# Patient Record
Sex: MALE | Race: BLACK OR AFRICAN AMERICAN | Employment: UNEMPLOYED | URBAN - METROPOLITAN AREA
[De-identification: names, ages, dates, MRNs, and addresses within clinical notes are randomized per-mention and may not be internally consistent; named-entity substitution may affect disease eponyms.]

---

## 2018-01-01 ENCOUNTER — TELEPHONE (OUTPATIENT)
Dept: PHARMACY | Age: 28
End: 2018-01-01

## 2018-01-01 ENCOUNTER — HOSPITAL ENCOUNTER (OUTPATIENT)
Dept: PHARMACY | Age: 28
Setting detail: THERAPIES SERIES
Discharge: HOME | End: 2018-09-04
Payer: MEDICAID

## 2018-01-01 ENCOUNTER — APPOINTMENT (OUTPATIENT)
Dept: CT IMAGING | Age: 28
DRG: 181 | End: 2018-01-01
Payer: MEDICAID

## 2018-01-01 ENCOUNTER — HOSPITAL ENCOUNTER (OUTPATIENT)
Dept: PHARMACY | Age: 28
Setting detail: THERAPIES SERIES
Discharge: HOME | End: 2018-08-22
Payer: MEDICAID

## 2018-01-01 ENCOUNTER — OFFICE VISIT (OUTPATIENT)
Dept: FAMILY MEDICINE CLINIC | Age: 28
End: 2018-01-01
Payer: MEDICAID

## 2018-01-01 ENCOUNTER — APPOINTMENT (OUTPATIENT)
Dept: GENERAL RADIOLOGY | Age: 28
DRG: 194 | End: 2018-01-01
Payer: MEDICAID

## 2018-01-01 ENCOUNTER — TELEPHONE (OUTPATIENT)
Dept: FAMILY MEDICINE CLINIC | Age: 28
End: 2018-01-01

## 2018-01-01 ENCOUNTER — HOSPITAL ENCOUNTER (OUTPATIENT)
Age: 28
Setting detail: SPECIMEN
Discharge: HOME | End: 2018-10-11
Payer: MEDICAID

## 2018-01-01 ENCOUNTER — APPOINTMENT (OUTPATIENT)
Dept: GENERAL RADIOLOGY | Age: 28
DRG: 181 | End: 2018-01-01
Payer: MEDICAID

## 2018-01-01 ENCOUNTER — APPOINTMENT (OUTPATIENT)
Dept: INTERVENTIONAL RADIOLOGY/VASCULAR | Age: 28
DRG: 181 | End: 2018-01-01
Payer: MEDICAID

## 2018-01-01 ENCOUNTER — HOSPITAL ENCOUNTER (OUTPATIENT)
Dept: PHARMACY | Age: 28
Setting detail: THERAPIES SERIES
Discharge: HOME | End: 2018-10-22
Payer: MEDICAID

## 2018-01-01 ENCOUNTER — HOSPITAL ENCOUNTER (OUTPATIENT)
Dept: PHARMACY | Age: 28
Setting detail: THERAPIES SERIES
Discharge: HOME | End: 2018-08-24
Payer: MEDICAID

## 2018-01-01 ENCOUNTER — HOSPITAL ENCOUNTER (OUTPATIENT)
Dept: PHARMACY | Age: 28
Setting detail: THERAPIES SERIES
Discharge: HOME | End: 2018-10-10
Payer: MEDICAID

## 2018-01-01 ENCOUNTER — HOSPITAL ENCOUNTER (OUTPATIENT)
Dept: PHARMACY | Age: 28
Setting detail: THERAPIES SERIES
Discharge: HOME | End: 2018-09-11
Payer: MEDICAID

## 2018-01-01 ENCOUNTER — HOSPITAL ENCOUNTER (OUTPATIENT)
Dept: PHARMACY | Age: 28
Setting detail: THERAPIES SERIES
Discharge: HOME | End: 2018-10-16
Payer: MEDICAID

## 2018-01-01 ENCOUNTER — TELEPHONE (OUTPATIENT)
Dept: ADMINISTRATIVE | Age: 28
End: 2018-01-01

## 2018-01-01 ENCOUNTER — HOSPITAL ENCOUNTER (INPATIENT)
Age: 28
LOS: 14 days | Discharge: HOSPICE | DRG: 181 | End: 2018-12-10
Attending: EMERGENCY MEDICINE | Admitting: FAMILY MEDICINE
Payer: MEDICAID

## 2018-01-01 ENCOUNTER — HOSPITAL ENCOUNTER (OUTPATIENT)
Dept: PHARMACY | Age: 28
Setting detail: THERAPIES SERIES
Discharge: HOME | End: 2018-11-14
Payer: MEDICAID

## 2018-01-01 ENCOUNTER — HOSPITAL ENCOUNTER (INPATIENT)
Age: 28
LOS: 5 days | Discharge: HOME HEALTH | DRG: 194 | End: 2018-08-19
Attending: EMERGENCY MEDICINE | Admitting: FAMILY MEDICINE
Payer: MEDICAID

## 2018-01-01 ENCOUNTER — HOSPITAL ENCOUNTER (OUTPATIENT)
Dept: PHARMACY | Age: 28
Setting detail: THERAPIES SERIES
Discharge: HOME | End: 2018-08-27
Payer: MEDICAID

## 2018-01-01 ENCOUNTER — HOSPITAL ENCOUNTER (OUTPATIENT)
Dept: PHARMACY | Age: 28
Setting detail: THERAPIES SERIES
Discharge: HOME | End: 2018-10-31
Payer: MEDICAID

## 2018-01-01 ENCOUNTER — HOSPITAL ENCOUNTER (OUTPATIENT)
Dept: PHARMACY | Age: 28
Setting detail: THERAPIES SERIES
Discharge: HOME | End: 2018-09-25
Payer: MEDICAID

## 2018-01-01 ENCOUNTER — ANESTHESIA EVENT (OUTPATIENT)
Dept: OPERATING ROOM | Age: 28
DRG: 181 | End: 2018-01-01
Payer: MEDICAID

## 2018-01-01 ENCOUNTER — ANESTHESIA (OUTPATIENT)
Dept: OPERATING ROOM | Age: 28
DRG: 181 | End: 2018-01-01
Payer: MEDICAID

## 2018-01-01 VITALS
SYSTOLIC BLOOD PRESSURE: 130 MMHG | DIASTOLIC BLOOD PRESSURE: 70 MMHG | TEMPERATURE: 96.8 F | HEIGHT: 66 IN | HEART RATE: 90 BPM | BODY MASS INDEX: 22.79 KG/M2 | WEIGHT: 141.8 LBS

## 2018-01-01 VITALS
SYSTOLIC BLOOD PRESSURE: 141 MMHG | WEIGHT: 126.76 LBS | TEMPERATURE: 98.1 F | RESPIRATION RATE: 22 BRPM | HEART RATE: 115 BPM | OXYGEN SATURATION: 93 % | DIASTOLIC BLOOD PRESSURE: 109 MMHG | BODY MASS INDEX: 20.37 KG/M2 | HEIGHT: 66 IN

## 2018-01-01 VITALS
DIASTOLIC BLOOD PRESSURE: 97 MMHG | WEIGHT: 119 LBS | TEMPERATURE: 97.5 F | BODY MASS INDEX: 19.13 KG/M2 | HEIGHT: 66 IN | HEART RATE: 126 BPM | OXYGEN SATURATION: 94 % | SYSTOLIC BLOOD PRESSURE: 112 MMHG | RESPIRATION RATE: 20 BRPM

## 2018-01-01 VITALS
HEIGHT: 66 IN | HEART RATE: 80 BPM | SYSTOLIC BLOOD PRESSURE: 120 MMHG | DIASTOLIC BLOOD PRESSURE: 70 MMHG | BODY MASS INDEX: 19.86 KG/M2 | WEIGHT: 123.6 LBS | TEMPERATURE: 96.2 F

## 2018-01-01 VITALS
BODY MASS INDEX: 29.37 KG/M2 | TEMPERATURE: 98.6 F | SYSTOLIC BLOOD PRESSURE: 145 MMHG | HEIGHT: 62 IN | HEART RATE: 106 BPM | DIASTOLIC BLOOD PRESSURE: 97 MMHG | WEIGHT: 159.6 LBS

## 2018-01-01 VITALS
TEMPERATURE: 97.1 F | BODY MASS INDEX: 19.29 KG/M2 | DIASTOLIC BLOOD PRESSURE: 70 MMHG | HEIGHT: 66 IN | WEIGHT: 120 LBS | SYSTOLIC BLOOD PRESSURE: 120 MMHG | HEART RATE: 80 BPM

## 2018-01-01 VITALS — WEIGHT: 146.8 LBS | BODY MASS INDEX: 27.02 KG/M2 | HEIGHT: 62 IN | TEMPERATURE: 98.5 F

## 2018-01-01 VITALS — TEMPERATURE: 97.4 F | BODY MASS INDEX: 20.48 KG/M2 | HEIGHT: 66 IN | WEIGHT: 127.4 LBS

## 2018-01-01 VITALS
BODY MASS INDEX: 27.09 KG/M2 | SYSTOLIC BLOOD PRESSURE: 132 MMHG | HEIGHT: 62 IN | TEMPERATURE: 98.1 F | DIASTOLIC BLOOD PRESSURE: 86 MMHG | HEART RATE: 74 BPM | WEIGHT: 147.2 LBS

## 2018-01-01 VITALS — SYSTOLIC BLOOD PRESSURE: 89 MMHG | DIASTOLIC BLOOD PRESSURE: 63 MMHG | TEMPERATURE: 96.9 F | OXYGEN SATURATION: 97 %

## 2018-01-01 DIAGNOSIS — R63.0 DECREASED APPETITE: Primary | ICD-10-CM

## 2018-01-01 DIAGNOSIS — I51.3 ATRIAL THROMBUS WITHOUT ANTECEDENT MYOCARDIAL INFARCTION: ICD-10-CM

## 2018-01-01 DIAGNOSIS — Z23 NEED FOR VACCINATION: ICD-10-CM

## 2018-01-01 DIAGNOSIS — F79 MENTAL RETARDATION: ICD-10-CM

## 2018-01-01 DIAGNOSIS — J18.9 PNEUMONIA DUE TO INFECTIOUS ORGANISM, UNSPECIFIED LATERALITY, UNSPECIFIED PART OF LUNG: Primary | ICD-10-CM

## 2018-01-01 DIAGNOSIS — R60.0 BILATERAL EDEMA OF LOWER EXTREMITY: ICD-10-CM

## 2018-01-01 DIAGNOSIS — I26.99 OTHER ACUTE PULMONARY EMBOLISM WITHOUT ACUTE COR PULMONALE (HCC): ICD-10-CM

## 2018-01-01 DIAGNOSIS — J01.90 ACUTE BACTERIAL SINUSITIS: Primary | ICD-10-CM

## 2018-01-01 DIAGNOSIS — I50.21 ACUTE SYSTOLIC CONGESTIVE HEART FAILURE (HCC): ICD-10-CM

## 2018-01-01 DIAGNOSIS — R05.8 RECURRENT PRODUCTIVE COUGH: Primary | ICD-10-CM

## 2018-01-01 DIAGNOSIS — G40.909 SEIZURE DISORDER (HCC): Primary | ICD-10-CM

## 2018-01-01 DIAGNOSIS — B96.89 ACUTE BACTERIAL SINUSITIS: Primary | ICD-10-CM

## 2018-01-01 DIAGNOSIS — J06.9 VIRAL URI: ICD-10-CM

## 2018-01-01 DIAGNOSIS — G40.909 SEIZURE DISORDER (HCC): ICD-10-CM

## 2018-01-01 DIAGNOSIS — R05.8 RECURRENT PRODUCTIVE COUGH: ICD-10-CM

## 2018-01-01 DIAGNOSIS — I50.9 CONGESTIVE HEART FAILURE, UNSPECIFIED HF CHRONICITY, UNSPECIFIED HEART FAILURE TYPE (HCC): Primary | ICD-10-CM

## 2018-01-01 DIAGNOSIS — I50.42 CHRONIC COMBINED SYSTOLIC AND DIASTOLIC CONGESTIVE HEART FAILURE (HCC): ICD-10-CM

## 2018-01-01 DIAGNOSIS — R05.8 POST-VIRAL COUGH SYNDROME: Primary | ICD-10-CM

## 2018-01-01 DIAGNOSIS — I50.9 ACUTE ON CHRONIC CONGESTIVE HEART FAILURE, UNSPECIFIED HEART FAILURE TYPE (HCC): Primary | ICD-10-CM

## 2018-01-01 LAB
-: NORMAL
ABSOLUTE EOS #: 0 K/UL (ref 0–0.4)
ABSOLUTE EOS #: 0 K/UL (ref 0–0.4)
ABSOLUTE EOS #: 0 K/UL (ref 0–0.44)
ABSOLUTE EOS #: 0 K/UL (ref 0–0.44)
ABSOLUTE EOS #: 0.03 K/UL (ref 0–0.44)
ABSOLUTE EOS #: 0.03 K/UL (ref 0–0.44)
ABSOLUTE EOS #: 0.04 K/UL (ref 0–0.44)
ABSOLUTE EOS #: 0.08 K/UL (ref 0–0.4)
ABSOLUTE EOS #: <0.03 K/UL (ref 0–0.44)
ABSOLUTE IMMATURE GRANULOCYTE: 0 K/UL (ref 0–0.3)
ABSOLUTE IMMATURE GRANULOCYTE: 0.03 K/UL (ref 0–0.3)
ABSOLUTE IMMATURE GRANULOCYTE: 0.03 K/UL (ref 0–0.3)
ABSOLUTE IMMATURE GRANULOCYTE: 0.04 K/UL (ref 0–0.3)
ABSOLUTE IMMATURE GRANULOCYTE: 0.05 K/UL (ref 0–0.3)
ABSOLUTE IMMATURE GRANULOCYTE: 0.06 K/UL (ref 0–0.3)
ABSOLUTE IMMATURE GRANULOCYTE: <0.03 K/UL (ref 0–0.3)
ABSOLUTE LYMPH #: 1.03 K/UL (ref 1.1–3.7)
ABSOLUTE LYMPH #: 1.2 K/UL (ref 1–4.8)
ABSOLUTE LYMPH #: 1.34 K/UL (ref 1–4.8)
ABSOLUTE LYMPH #: 1.87 K/UL (ref 1.1–3.7)
ABSOLUTE LYMPH #: 2 K/UL (ref 1–4.8)
ABSOLUTE LYMPH #: 2.11 K/UL (ref 1.1–3.7)
ABSOLUTE LYMPH #: 2.17 K/UL (ref 1.1–3.7)
ABSOLUTE LYMPH #: 2.35 K/UL (ref 1.1–3.7)
ABSOLUTE LYMPH #: 2.37 K/UL (ref 1.1–3.7)
ABSOLUTE LYMPH #: 2.78 K/UL (ref 1.1–3.7)
ABSOLUTE LYMPH #: 3.3 K/UL (ref 1.1–3.7)
ABSOLUTE LYMPH #: 3.58 K/UL (ref 1.1–3.7)
ABSOLUTE LYMPH #: 3.62 K/UL (ref 1.1–3.7)
ABSOLUTE LYMPH #: 3.9 K/UL (ref 1.1–3.7)
ABSOLUTE MONO #: 0 K/UL (ref 0.1–0.8)
ABSOLUTE MONO #: 0.21 K/UL (ref 0.1–1.2)
ABSOLUTE MONO #: 0.23 K/UL (ref 0.1–0.8)
ABSOLUTE MONO #: 0.23 K/UL (ref 0.1–1.2)
ABSOLUTE MONO #: 0.25 K/UL (ref 0.1–1.2)
ABSOLUTE MONO #: 0.29 K/UL (ref 0.1–0.8)
ABSOLUTE MONO #: 0.29 K/UL (ref 0.1–1.2)
ABSOLUTE MONO #: 0.32 K/UL (ref 0.1–1.2)
ABSOLUTE MONO #: 0.33 K/UL (ref 0.1–1.2)
ABSOLUTE MONO #: 0.38 K/UL (ref 0.1–1.2)
ABSOLUTE MONO #: 0.39 K/UL (ref 0.1–1.2)
ABSOLUTE MONO #: 0.39 K/UL (ref 0.1–1.2)
ABSOLUTE MONO #: 0.41 K/UL (ref 0.1–1.2)
ABSOLUTE MONO #: 0.56 K/UL (ref 0.1–1.2)
ADENOVIRUS PCR: NOT DETECTED
ALBUMIN SERPL-MCNC: 2.3 G/DL (ref 3.5–5.2)
ALBUMIN SERPL-MCNC: 3 G/DL (ref 3.5–5.2)
ALBUMIN/GLOBULIN RATIO: 0.6 (ref 1–2.5)
ALBUMIN/GLOBULIN RATIO: 1.3 (ref 1–2.5)
ALP BLD-CCNC: 53 U/L (ref 40–129)
ALP BLD-CCNC: 71 U/L (ref 40–129)
ALT SERPL-CCNC: 10 U/L (ref 5–41)
ALT SERPL-CCNC: <5 U/L (ref 5–41)
ANION GAP SERPL CALCULATED.3IONS-SCNC: 12 MMOL/L (ref 9–17)
ANION GAP SERPL CALCULATED.3IONS-SCNC: 13 MMOL/L (ref 9–17)
ANION GAP SERPL CALCULATED.3IONS-SCNC: 14 MMOL/L (ref 9–17)
ANION GAP SERPL CALCULATED.3IONS-SCNC: 15 MMOL/L (ref 9–17)
ANION GAP SERPL CALCULATED.3IONS-SCNC: 16 MMOL/L (ref 9–17)
ANION GAP SERPL CALCULATED.3IONS-SCNC: 18 MMOL/L (ref 9–17)
ANION GAP SERPL CALCULATED.3IONS-SCNC: 24 MMOL/L (ref 9–17)
ANION GAP SERPL CALCULATED.3IONS-SCNC: 28 MMOL/L (ref 9–17)
ANTICARDIOLIPIN IGA ANTIBODY: 3.2 APU
ANTICARDIOLIPIN IGG ANTIBODY: 2.2 GPU
AST SERPL-CCNC: 15 U/L
AST SERPL-CCNC: <5 U/L
AT-III ACTIVITY: 84 % (ref 83–122)
BASOPHILS # BLD: 0 % (ref 0–2)
BASOPHILS # BLD: 1 % (ref 0–2)
BASOPHILS # BLD: 1 % (ref 0–2)
BASOPHILS ABSOLUTE: 0 K/UL (ref 0–0.2)
BASOPHILS ABSOLUTE: 0.03 K/UL (ref 0–0.2)
BASOPHILS ABSOLUTE: 0.05 K/UL (ref 0–0.2)
BASOPHILS ABSOLUTE: <0.03 K/UL (ref 0–0.2)
BILIRUB SERPL-MCNC: 1.12 MG/DL (ref 0.3–1.2)
BILIRUB SERPL-MCNC: 1.35 MG/DL (ref 0.3–1.2)
BNP INTERPRETATION: ABNORMAL
BNP INTERPRETATION: ABNORMAL
BORDETELLA PERTUSSIS PCR: NOT DETECTED
BUN BLDV-MCNC: 12 MG/DL (ref 6–20)
BUN BLDV-MCNC: 12 MG/DL (ref 6–20)
BUN BLDV-MCNC: 13 MG/DL (ref 6–20)
BUN BLDV-MCNC: 15 MG/DL (ref 6–20)
BUN BLDV-MCNC: 15 MG/DL (ref 6–20)
BUN BLDV-MCNC: 16 MG/DL (ref 6–20)
BUN BLDV-MCNC: 17 MG/DL (ref 6–20)
BUN BLDV-MCNC: 18 MG/DL (ref 6–20)
BUN BLDV-MCNC: 21 MG/DL (ref 6–20)
BUN BLDV-MCNC: 22 MG/DL (ref 6–20)
BUN BLDV-MCNC: 24 MG/DL (ref 6–20)
BUN BLDV-MCNC: 24 MG/DL (ref 6–20)
BUN BLDV-MCNC: 27 MG/DL (ref 6–20)
BUN BLDV-MCNC: 28 MG/DL (ref 6–20)
BUN BLDV-MCNC: 29 MG/DL (ref 6–20)
BUN BLDV-MCNC: 8 MG/DL (ref 6–20)
BUN/CREAT BLD: ABNORMAL (ref 9–20)
BUN/CREAT BLD: NORMAL (ref 9–20)
C-REACTIVE PROTEIN: 4.9 MG/L (ref 0–5)
CALCIUM SERPL-MCNC: 8 MG/DL (ref 8.6–10.4)
CALCIUM SERPL-MCNC: 8.2 MG/DL (ref 8.6–10.4)
CALCIUM SERPL-MCNC: 8.4 MG/DL (ref 8.6–10.4)
CALCIUM SERPL-MCNC: 8.4 MG/DL (ref 8.6–10.4)
CALCIUM SERPL-MCNC: 8.5 MG/DL (ref 8.6–10.4)
CALCIUM SERPL-MCNC: 8.5 MG/DL (ref 8.6–10.4)
CALCIUM SERPL-MCNC: 8.6 MG/DL (ref 8.6–10.4)
CALCIUM SERPL-MCNC: 8.8 MG/DL (ref 8.6–10.4)
CALCIUM SERPL-MCNC: 8.9 MG/DL (ref 8.6–10.4)
CALCIUM SERPL-MCNC: 9 MG/DL (ref 8.6–10.4)
CALCIUM SERPL-MCNC: 9.1 MG/DL (ref 8.6–10.4)
CALCIUM SERPL-MCNC: 9.1 MG/DL (ref 8.6–10.4)
CALCIUM SERPL-MCNC: 9.2 MG/DL (ref 8.6–10.4)
CALCIUM SERPL-MCNC: 9.4 MG/DL (ref 8.6–10.4)
CALCIUM SERPL-MCNC: 9.5 MG/DL (ref 8.6–10.4)
CALCIUM SERPL-MCNC: 9.6 MG/DL (ref 8.6–10.4)
CARDIOLIPIN AB IGM: 20.8 MPU
CHLAMYDIA PNEUMONIAE BY PCR: NOT DETECTED
CHLORIDE BLD-SCNC: 101 MMOL/L (ref 98–107)
CHLORIDE BLD-SCNC: 101 MMOL/L (ref 98–107)
CHLORIDE BLD-SCNC: 102 MMOL/L (ref 98–107)
CHLORIDE BLD-SCNC: 103 MMOL/L (ref 98–107)
CHLORIDE BLD-SCNC: 103 MMOL/L (ref 98–107)
CHLORIDE BLD-SCNC: 105 MMOL/L (ref 98–107)
CHLORIDE BLD-SCNC: 107 MMOL/L (ref 98–107)
CHLORIDE BLD-SCNC: 110 MMOL/L (ref 98–107)
CHLORIDE BLD-SCNC: 110 MMOL/L (ref 98–107)
CHLORIDE BLD-SCNC: 111 MMOL/L (ref 98–107)
CHLORIDE BLD-SCNC: 111 MMOL/L (ref 98–107)
CHLORIDE BLD-SCNC: 112 MMOL/L (ref 98–107)
CHLORIDE BLD-SCNC: 113 MMOL/L (ref 98–107)
CHLORIDE BLD-SCNC: 98 MMOL/L (ref 98–107)
CHLORIDE BLD-SCNC: 98 MMOL/L (ref 98–107)
CHLORIDE BLD-SCNC: 99 MMOL/L (ref 98–107)
CO2: 13 MMOL/L (ref 20–31)
CO2: 16 MMOL/L (ref 20–31)
CO2: 16 MMOL/L (ref 20–31)
CO2: 17 MMOL/L (ref 20–31)
CO2: 18 MMOL/L (ref 20–31)
CO2: 18 MMOL/L (ref 20–31)
CO2: 19 MMOL/L (ref 20–31)
CO2: 20 MMOL/L (ref 20–31)
CO2: 21 MMOL/L (ref 20–31)
CO2: 22 MMOL/L (ref 20–31)
CO2: 22 MMOL/L (ref 20–31)
CORONAVIRUS 229E PCR: NOT DETECTED
CORONAVIRUS HKU1 PCR: NOT DETECTED
CORONAVIRUS NL63 PCR: NOT DETECTED
CORONAVIRUS OC43 PCR: NOT DETECTED
CREAT SERPL-MCNC: 0.51 MG/DL (ref 0.7–1.2)
CREAT SERPL-MCNC: 0.58 MG/DL (ref 0.7–1.2)
CREAT SERPL-MCNC: 0.61 MG/DL (ref 0.7–1.2)
CREAT SERPL-MCNC: 0.68 MG/DL (ref 0.7–1.2)
CREAT SERPL-MCNC: 0.75 MG/DL (ref 0.7–1.2)
CREAT SERPL-MCNC: 0.76 MG/DL (ref 0.7–1.2)
CREAT SERPL-MCNC: 0.78 MG/DL (ref 0.7–1.2)
CREAT SERPL-MCNC: 0.78 MG/DL (ref 0.7–1.2)
CREAT SERPL-MCNC: 0.79 MG/DL (ref 0.7–1.2)
CREAT SERPL-MCNC: 0.88 MG/DL (ref 0.7–1.2)
CREAT SERPL-MCNC: 0.88 MG/DL (ref 0.7–1.2)
CREAT SERPL-MCNC: 0.94 MG/DL (ref 0.7–1.2)
CREAT SERPL-MCNC: 0.97 MG/DL (ref 0.7–1.2)
CREAT SERPL-MCNC: 0.99 MG/DL (ref 0.7–1.2)
CREAT SERPL-MCNC: 1.02 MG/DL (ref 0.7–1.2)
CREAT SERPL-MCNC: 1.15 MG/DL (ref 0.7–1.2)
CULTURE: NORMAL
CULTURE: NORMAL
DIFFERENTIAL TYPE: ABNORMAL
DILUTE RUSSELL VIPER VENOM TIME: ABNORMAL
EKG ATRIAL RATE: 113 BPM
EKG ATRIAL RATE: 122 BPM
EKG ATRIAL RATE: 133 BPM
EKG P AXIS: 55 DEGREES
EKG P AXIS: 64 DEGREES
EKG P AXIS: 73 DEGREES
EKG P-R INTERVAL: 136 MS
EKG P-R INTERVAL: 138 MS
EKG P-R INTERVAL: 148 MS
EKG Q-T INTERVAL: 290 MS
EKG Q-T INTERVAL: 322 MS
EKG Q-T INTERVAL: 340 MS
EKG QRS DURATION: 70 MS
EKG QRS DURATION: 70 MS
EKG QRS DURATION: 72 MS
EKG QTC CALCULATION (BAZETT): 431 MS
EKG QTC CALCULATION (BAZETT): 458 MS
EKG QTC CALCULATION (BAZETT): 466 MS
EKG R AXIS: -24 DEGREES
EKG R AXIS: -31 DEGREES
EKG R AXIS: -37 DEGREES
EKG T AXIS: 109 DEGREES
EKG T AXIS: 22 DEGREES
EKG T AXIS: 97 DEGREES
EKG VENTRICULAR RATE: 113 BPM
EKG VENTRICULAR RATE: 122 BPM
EKG VENTRICULAR RATE: 133 BPM
EOSINOPHILS RELATIVE PERCENT: 0 % (ref 1–4)
EOSINOPHILS RELATIVE PERCENT: 1 % (ref 1–4)
FACTOR V LEIDEN MUTATION: NORMAL
FACTOR VIII ACTIVITY: 379 % (ref 50–150)
GFR AFRICAN AMERICAN: >60 ML/MIN
GFR NON-AFRICAN AMERICAN: >60 ML/MIN
GFR SERPL CREATININE-BSD FRML MDRD: ABNORMAL ML/MIN/{1.73_M2}
GFR SERPL CREATININE-BSD FRML MDRD: NORMAL ML/MIN/{1.73_M2}
GFR SERPL CREATININE-BSD FRML MDRD: NORMAL ML/MIN/{1.73_M2}
GLUCOSE BLD-MCNC: 100 MG/DL (ref 70–99)
GLUCOSE BLD-MCNC: 101 MG/DL (ref 70–99)
GLUCOSE BLD-MCNC: 105 MG/DL (ref 70–99)
GLUCOSE BLD-MCNC: 105 MG/DL (ref 75–110)
GLUCOSE BLD-MCNC: 109 MG/DL (ref 75–110)
GLUCOSE BLD-MCNC: 111 MG/DL (ref 70–99)
GLUCOSE BLD-MCNC: 142 MG/DL (ref 70–99)
GLUCOSE BLD-MCNC: 151 MG/DL (ref 70–99)
GLUCOSE BLD-MCNC: 60 MG/DL (ref 75–110)
GLUCOSE BLD-MCNC: 62 MG/DL (ref 75–110)
GLUCOSE BLD-MCNC: 68 MG/DL (ref 70–99)
GLUCOSE BLD-MCNC: 68 MG/DL (ref 75–110)
GLUCOSE BLD-MCNC: 69 MG/DL (ref 70–99)
GLUCOSE BLD-MCNC: 75 MG/DL (ref 75–110)
GLUCOSE BLD-MCNC: 75 MG/DL (ref 75–110)
GLUCOSE BLD-MCNC: 76 MG/DL (ref 70–99)
GLUCOSE BLD-MCNC: 77 MG/DL (ref 75–110)
GLUCOSE BLD-MCNC: 78 MG/DL (ref 70–99)
GLUCOSE BLD-MCNC: 82 MG/DL (ref 75–110)
GLUCOSE BLD-MCNC: 87 MG/DL (ref 70–99)
GLUCOSE BLD-MCNC: 87 MG/DL (ref 75–110)
GLUCOSE BLD-MCNC: 88 MG/DL (ref 70–99)
GLUCOSE BLD-MCNC: 90 MG/DL (ref 70–99)
GLUCOSE BLD-MCNC: 92 MG/DL (ref 70–99)
GLUCOSE BLD-MCNC: 92 MG/DL (ref 70–99)
GLUCOSE BLD-MCNC: 92 MG/DL (ref 75–110)
GLUCOSE BLD-MCNC: 94 MG/DL (ref 70–99)
GLUCOSE BLD-MCNC: 94 MG/DL (ref 75–110)
GLUCOSE BLD-MCNC: 96 MG/DL (ref 75–110)
HCT VFR BLD CALC: 39.7 % (ref 40.7–50.3)
HCT VFR BLD CALC: 39.7 % (ref 40.7–50.3)
HCT VFR BLD CALC: 41 % (ref 40.7–50.3)
HCT VFR BLD CALC: 41.4 % (ref 40.7–50.3)
HCT VFR BLD CALC: 42.5 % (ref 40.7–50.3)
HCT VFR BLD CALC: 42.7 % (ref 40.7–50.3)
HCT VFR BLD CALC: 43.1 % (ref 40.7–50.3)
HCT VFR BLD CALC: 43.3 % (ref 40.7–50.3)
HCT VFR BLD CALC: 44.2 % (ref 40.7–50.3)
HCT VFR BLD CALC: 44.9 % (ref 40.7–50.3)
HCT VFR BLD CALC: 45.1 % (ref 40.7–50.3)
HCT VFR BLD CALC: 45.4 % (ref 40.7–50.3)
HCT VFR BLD CALC: 45.7 % (ref 40.7–50.3)
HCT VFR BLD CALC: 46 % (ref 40.7–50.3)
HCT VFR BLD CALC: 46.7 % (ref 40.7–50.3)
HCT VFR BLD CALC: 47.5 % (ref 40.7–50.3)
HCT VFR BLD CALC: 47.6 % (ref 40.7–50.3)
HEMOGLOBIN: 13.1 G/DL (ref 13–17)
HEMOGLOBIN: 13.3 G/DL (ref 13–17)
HEMOGLOBIN: 13.4 G/DL (ref 13–17)
HEMOGLOBIN: 13.6 G/DL (ref 13–17)
HEMOGLOBIN: 13.7 G/DL (ref 13–17)
HEMOGLOBIN: 13.8 G/DL (ref 13–17)
HEMOGLOBIN: 13.9 G/DL (ref 13–17)
HEMOGLOBIN: 14.4 G/DL (ref 13–17)
HEMOGLOBIN: 14.4 G/DL (ref 13–17)
HEMOGLOBIN: 14.5 G/DL (ref 13–17)
HEMOGLOBIN: 14.5 G/DL (ref 13–17)
HEMOGLOBIN: 14.9 G/DL (ref 13–17)
HEMOGLOBIN: 14.9 G/DL (ref 13–17)
HEMOGLOBIN: 15 G/DL (ref 13–17)
HEMOGLOBIN: 15.1 G/DL (ref 13–17)
HEMOGLOBIN: 15.3 G/DL (ref 13–17)
HEMOGLOBIN: 15.4 G/DL (ref 13–17)
HUMAN METAPNEUMOVIRUS PCR: NOT DETECTED
IMMATURE GRANULOCYTES: 0 %
IMMATURE GRANULOCYTES: 1 %
INFLUENZA A BY PCR: NOT DETECTED
INFLUENZA A H1 (2009) PCR: NORMAL
INFLUENZA A H1 PCR: NORMAL
INFLUENZA A H3 PCR: NORMAL
INFLUENZA B BY PCR: NOT DETECTED
INR BLD: 1.2
INR BLD: 1.3
INR BLD: 1.5
INR BLD: 1.6
INR BLD: 1.9
INR BLD: 1.9
INR BLD: 10.6
INR BLD: 2
INR BLD: 2
INR BLD: 2.1
INR BLD: 2.3
INR BLD: 2.4
INR BLD: 2.8
INR BLD: 3.1
INR BLD: 4.6
INR BLD: 8
INR BLD: 8.5
KEPPRA: 32 UG/ML
KEPPRA: 39 UG/ML
LUPUS ANTICOAG: ABNORMAL
LV EF: 10 %
LVEF MODALITY: NORMAL
LYMPHOCYTES # BLD: 14 % (ref 24–43)
LYMPHOCYTES # BLD: 21 % (ref 24–44)
LYMPHOCYTES # BLD: 24 % (ref 24–44)
LYMPHOCYTES # BLD: 26 % (ref 24–44)
LYMPHOCYTES # BLD: 39 % (ref 24–43)
LYMPHOCYTES # BLD: 40 % (ref 24–43)
LYMPHOCYTES # BLD: 43 % (ref 24–43)
LYMPHOCYTES # BLD: 43 % (ref 24–43)
LYMPHOCYTES # BLD: 45 % (ref 24–43)
LYMPHOCYTES # BLD: 45 % (ref 24–43)
LYMPHOCYTES # BLD: 49 % (ref 24–43)
LYMPHOCYTES # BLD: 49 % (ref 24–43)
LYMPHOCYTES # BLD: 54 % (ref 24–43)
LYMPHOCYTES # BLD: 56 % (ref 24–43)
Lab: NORMAL
Lab: NORMAL
MAGNESIUM: 2.1 MG/DL (ref 1.6–2.6)
MAGNESIUM: 2.2 MG/DL (ref 1.6–2.6)
MAGNESIUM: 2.5 MG/DL (ref 1.6–2.6)
MAGNESIUM: 2.5 MG/DL (ref 1.6–2.6)
MCH RBC QN AUTO: 29.8 PG (ref 25.2–33.5)
MCH RBC QN AUTO: 29.8 PG (ref 25.2–33.5)
MCH RBC QN AUTO: 29.9 PG (ref 25.2–33.5)
MCH RBC QN AUTO: 30 PG (ref 25.2–33.5)
MCH RBC QN AUTO: 30 PG (ref 25.2–33.5)
MCH RBC QN AUTO: 30.4 PG (ref 25.2–33.5)
MCH RBC QN AUTO: 30.6 PG (ref 25.2–33.5)
MCH RBC QN AUTO: 30.6 PG (ref 25.2–33.5)
MCH RBC QN AUTO: 31 PG (ref 25.2–33.5)
MCH RBC QN AUTO: 31.1 PG (ref 25.2–33.5)
MCH RBC QN AUTO: 31.5 PG (ref 25.2–33.5)
MCH RBC QN AUTO: 33 PG (ref 25.2–33.5)
MCHC RBC AUTO-ENTMCNC: 31.3 G/DL (ref 28.4–34.8)
MCHC RBC AUTO-ENTMCNC: 31.5 G/DL (ref 28.4–34.8)
MCHC RBC AUTO-ENTMCNC: 31.8 G/DL (ref 28.4–34.8)
MCHC RBC AUTO-ENTMCNC: 31.8 G/DL (ref 28.4–34.8)
MCHC RBC AUTO-ENTMCNC: 32.3 G/DL (ref 28.4–34.8)
MCHC RBC AUTO-ENTMCNC: 32.3 G/DL (ref 28.4–34.8)
MCHC RBC AUTO-ENTMCNC: 32.4 G/DL (ref 28.4–34.8)
MCHC RBC AUTO-ENTMCNC: 32.6 G/DL (ref 28.4–34.8)
MCHC RBC AUTO-ENTMCNC: 32.7 G/DL (ref 28.4–34.8)
MCHC RBC AUTO-ENTMCNC: 32.8 G/DL (ref 28.4–34.8)
MCHC RBC AUTO-ENTMCNC: 32.9 G/DL (ref 28.4–34.8)
MCHC RBC AUTO-ENTMCNC: 33 G/DL (ref 28.4–34.8)
MCHC RBC AUTO-ENTMCNC: 33 G/DL (ref 28.4–34.8)
MCHC RBC AUTO-ENTMCNC: 33.5 G/DL (ref 28.4–34.8)
MCHC RBC AUTO-ENTMCNC: 33.8 G/DL (ref 28.4–34.8)
MCV RBC AUTO: 90.6 FL (ref 82.6–102.9)
MCV RBC AUTO: 92.2 FL (ref 82.6–102.9)
MCV RBC AUTO: 92.6 FL (ref 82.6–102.9)
MCV RBC AUTO: 92.6 FL (ref 82.6–102.9)
MCV RBC AUTO: 92.7 FL (ref 82.6–102.9)
MCV RBC AUTO: 92.8 FL (ref 82.6–102.9)
MCV RBC AUTO: 93.9 FL (ref 82.6–102.9)
MCV RBC AUTO: 94.9 FL (ref 82.6–102.9)
MCV RBC AUTO: 95.2 FL (ref 82.6–102.9)
MCV RBC AUTO: 95.5 FL (ref 82.6–102.9)
MCV RBC AUTO: 95.6 FL (ref 82.6–102.9)
MCV RBC AUTO: 96.2 FL (ref 82.6–102.9)
MCV RBC AUTO: 96.3 FL (ref 82.6–102.9)
MCV RBC AUTO: 97.8 FL (ref 82.6–102.9)
MCV RBC AUTO: 98.9 FL (ref 82.6–102.9)
MONOCYTES # BLD: 0 % (ref 1–7)
MONOCYTES # BLD: 3 % (ref 1–7)
MONOCYTES # BLD: 3 % (ref 3–12)
MONOCYTES # BLD: 4 % (ref 3–12)
MONOCYTES # BLD: 5 % (ref 1–7)
MONOCYTES # BLD: 5 % (ref 3–12)
MONOCYTES # BLD: 6 % (ref 3–12)
MONOCYTES # BLD: 7 % (ref 3–12)
MONOCYTES # BLD: 7 % (ref 3–12)
MONOCYTES # BLD: 8 % (ref 3–12)
MORPHOLOGY: ABNORMAL
MYCOPLASMA PNEUMONIAE PCR: NOT DETECTED
NRBC AUTOMATED: 0 PER 100 WBC
NRBC AUTOMATED: 0.3 PER 100 WBC
NRBC AUTOMATED: 0.4 PER 100 WBC
NRBC AUTOMATED: 0.5 PER 100 WBC
NRBC AUTOMATED: 0.5 PER 100 WBC
NRBC AUTOMATED: 0.8 PER 100 WBC
NRBC AUTOMATED: 1.6 PER 100 WBC
NRBC AUTOMATED: 1.7 PER 100 WBC
NRBC AUTOMATED: 2 PER 100 WBC
NRBC AUTOMATED: 3.9 PER 100 WBC
NUCLEATED RED BLOOD CELLS: 2 PER 100 WBC
NUCLEATED RED BLOOD CELLS: 3 PER 100 WBC
NUCLEATED RED BLOOD CELLS: 3 PER 100 WBC
PARAINFLUENZA 1 PCR: NOT DETECTED
PARAINFLUENZA 2 PCR: NOT DETECTED
PARAINFLUENZA 3 PCR: NOT DETECTED
PARAINFLUENZA 4 PCR: NOT DETECTED
PARTIAL THROMBOPLASTIN TIME: 21.2 SEC (ref 20.5–30.5)
PARTIAL THROMBOPLASTIN TIME: 22.1 SEC (ref 20.5–30.5)
PARTIAL THROMBOPLASTIN TIME: 24 SEC (ref 20.5–30.5)
PARTIAL THROMBOPLASTIN TIME: 26.4 SEC (ref 20.5–30.5)
PARTIAL THROMBOPLASTIN TIME: 28.2 SEC (ref 20.5–30.5)
PARTIAL THROMBOPLASTIN TIME: 29.9 SEC (ref 20.5–30.5)
PARTIAL THROMBOPLASTIN TIME: 34.1 SEC (ref 20.5–30.5)
PARTIAL THROMBOPLASTIN TIME: 37.5 SEC (ref 20.5–30.5)
PARTIAL THROMBOPLASTIN TIME: 40.1 SEC (ref 20.5–30.5)
PARTIAL THROMBOPLASTIN TIME: 45.6 SEC (ref 20.5–30.5)
PARTIAL THROMBOPLASTIN TIME: 47 SEC (ref 20.5–30.5)
PARTIAL THROMBOPLASTIN TIME: 56.6 SEC (ref 20.5–30.5)
PARTIAL THROMBOPLASTIN TIME: 64.5 SEC (ref 20.5–30.5)
PARTIAL THROMBOPLASTIN TIME: 66 SEC (ref 20.5–30.5)
PARTIAL THROMBOPLASTIN TIME: 68 SEC (ref 20.5–30.5)
PARTIAL THROMBOPLASTIN TIME: 79.8 SEC (ref 20.5–30.5)
PARTIAL THROMBOPLASTIN TIME: 90.8 SEC (ref 20.5–30.5)
PARTIAL THROMBOPLASTIN TIME: >120 SEC (ref 20.5–30.5)
PDW BLD-RTO: 16.7 % (ref 11.8–14.4)
PDW BLD-RTO: 16.8 % (ref 11.8–14.4)
PDW BLD-RTO: 16.8 % (ref 11.8–14.4)
PDW BLD-RTO: 16.9 % (ref 11.8–14.4)
PDW BLD-RTO: 16.9 % (ref 11.8–14.4)
PDW BLD-RTO: 17.1 % (ref 11.8–14.4)
PDW BLD-RTO: 19.2 % (ref 11.8–14.4)
PDW BLD-RTO: 19.7 % (ref 11.8–14.4)
PDW BLD-RTO: 20 % (ref 11.8–14.4)
PDW BLD-RTO: 20.1 % (ref 11.8–14.4)
PDW BLD-RTO: 20.4 % (ref 11.8–14.4)
PDW BLD-RTO: 20.8 % (ref 11.8–14.4)
PDW BLD-RTO: 20.8 % (ref 11.8–14.4)
PDW BLD-RTO: 20.9 % (ref 11.8–14.4)
PDW BLD-RTO: 21.1 % (ref 11.8–14.4)
PHOSPHORUS: 2.7 MG/DL (ref 2.5–4.5)
PHOSPHORUS: 3.3 MG/DL (ref 2.5–4.5)
PHOSPHORUS: 3.5 MG/DL (ref 2.5–4.5)
PHOSPHORUS: 3.9 MG/DL (ref 2.5–4.5)
PLATELET # BLD: 185 K/UL (ref 138–453)
PLATELET # BLD: 185 K/UL (ref 138–453)
PLATELET # BLD: 194 K/UL (ref 138–453)
PLATELET # BLD: 204 K/UL (ref 138–453)
PLATELET # BLD: 207 K/UL (ref 138–453)
PLATELET # BLD: 213 K/UL (ref 138–453)
PLATELET # BLD: 234 K/UL (ref 138–453)
PLATELET # BLD: 258 K/UL (ref 138–453)
PLATELET # BLD: 263 K/UL (ref 138–453)
PLATELET # BLD: 286 K/UL (ref 138–453)
PLATELET # BLD: 300 K/UL (ref 138–453)
PLATELET # BLD: 303 K/UL (ref 138–453)
PLATELET # BLD: 319 K/UL (ref 138–453)
PLATELET # BLD: 320 K/UL (ref 138–453)
PLATELET # BLD: 363 K/UL (ref 138–453)
PLATELET ESTIMATE: ABNORMAL
PMV BLD AUTO: 10.3 FL (ref 8.1–13.5)
PMV BLD AUTO: 10.8 FL (ref 8.1–13.5)
PMV BLD AUTO: 10.8 FL (ref 8.1–13.5)
PMV BLD AUTO: 10.9 FL (ref 8.1–13.5)
PMV BLD AUTO: 11.1 FL (ref 8.1–13.5)
PMV BLD AUTO: 11.2 FL (ref 8.1–13.5)
PMV BLD AUTO: 11.4 FL (ref 8.1–13.5)
PMV BLD AUTO: 11.5 FL (ref 8.1–13.5)
PMV BLD AUTO: 11.6 FL (ref 8.1–13.5)
PMV BLD AUTO: 11.9 FL (ref 8.1–13.5)
PMV BLD AUTO: 12.3 FL (ref 8.1–13.5)
POC TROPONIN I: 0.03 NG/ML (ref 0–0.1)
POC TROPONIN I: 0.05 NG/ML (ref 0–0.1)
POC TROPONIN INTERP: NORMAL
POC TROPONIN INTERP: NORMAL
POTASSIUM SERPL-SCNC: 3.2 MMOL/L (ref 3.7–5.3)
POTASSIUM SERPL-SCNC: 3.3 MMOL/L (ref 3.7–5.3)
POTASSIUM SERPL-SCNC: 3.5 MMOL/L (ref 3.7–5.3)
POTASSIUM SERPL-SCNC: 3.6 MMOL/L (ref 3.7–5.3)
POTASSIUM SERPL-SCNC: 3.7 MMOL/L (ref 3.7–5.3)
POTASSIUM SERPL-SCNC: 3.8 MMOL/L (ref 3.7–5.3)
POTASSIUM SERPL-SCNC: 3.9 MMOL/L (ref 3.7–5.3)
POTASSIUM SERPL-SCNC: 4.1 MMOL/L (ref 3.7–5.3)
POTASSIUM SERPL-SCNC: 4.3 MMOL/L (ref 3.7–5.3)
POTASSIUM SERPL-SCNC: 4.3 MMOL/L (ref 3.7–5.3)
POTASSIUM SERPL-SCNC: 4.4 MMOL/L (ref 3.7–5.3)
POTASSIUM SERPL-SCNC: 4.6 MMOL/L (ref 3.7–5.3)
PREALBUMIN: 13.3 MG/DL (ref 20–40)
PREALBUMIN: 18.9 MG/DL (ref 20–40)
PRO-BNP: 9034 PG/ML
PRO-BNP: ABNORMAL PG/ML
PROCALCITONIN: 0.17 NG/ML
PROTEIN C ACTIVITY: 26 %
PROTEIN S ACTIVITY: 113 % (ref 77–116)
PROTHROMBIN G20210A MUTATION: NORMAL
PROTHROMBIN TIME: 13.1 SEC (ref 9–12)
PROTHROMBIN TIME: 14 SEC (ref 9–12)
PROTHROMBIN TIME: 19.4 SEC (ref 9–12)
PROTHROMBIN TIME: 20.1 SEC (ref 9–12)
PROTHROMBIN TIME: 20.2 SEC (ref 9–12)
PROTHROMBIN TIME: 24 SEC (ref 9–12)
PROTHROMBIN TIME: 79.4 SEC (ref 9–12)
PROTHROMBIN TIME: 97.5 SEC (ref 9–12)
PROTIME: 18.5 SECONDS
PROTIME: 19.4 SECONDS
PROTIME: 22.5 SECONDS
PROTIME: 25 SECONDS
PROTIME: 27.2 SECONDS
PROTIME: 37.5 SECONDS
PROTIME: 54.6 SECONDS
PROTIME: 96 SECONDS
RBC # BLD: 4.06 M/UL (ref 4.21–5.77)
RBC # BLD: 4.29 M/UL (ref 4.21–5.77)
RBC # BLD: 4.38 M/UL (ref 4.21–5.77)
RBC # BLD: 4.47 M/UL (ref 4.21–5.77)
RBC # BLD: 4.48 M/UL (ref 4.21–5.77)
RBC # BLD: 4.59 M/UL (ref 4.21–5.77)
RBC # BLD: 4.63 M/UL (ref 4.21–5.77)
RBC # BLD: 4.63 M/UL (ref 4.21–5.77)
RBC # BLD: 4.65 M/UL (ref 4.21–5.77)
RBC # BLD: 4.67 M/UL (ref 4.21–5.77)
RBC # BLD: 4.84 M/UL (ref 4.21–5.77)
RBC # BLD: 4.85 M/UL (ref 4.21–5.77)
RBC # BLD: 4.87 M/UL (ref 4.21–5.77)
RBC # BLD: 4.94 M/UL (ref 4.21–5.77)
RBC # BLD: 5 M/UL (ref 4.21–5.77)
RBC # BLD: ABNORMAL 10*6/UL
REASON FOR REJECTION: NORMAL
RESP SYNCYTIAL VIRUS PCR: NOT DETECTED
RHINO/ENTEROVIRUS PCR: NOT DETECTED
SEG NEUTROPHILS: 36 % (ref 36–65)
SEG NEUTROPHILS: 40 % (ref 36–65)
SEG NEUTROPHILS: 44 % (ref 36–65)
SEG NEUTROPHILS: 44 % (ref 36–65)
SEG NEUTROPHILS: 49 % (ref 36–65)
SEG NEUTROPHILS: 49 % (ref 36–65)
SEG NEUTROPHILS: 52 % (ref 36–65)
SEG NEUTROPHILS: 53 % (ref 36–65)
SEG NEUTROPHILS: 70 % (ref 36–66)
SEG NEUTROPHILS: 73 % (ref 36–66)
SEG NEUTROPHILS: 76 % (ref 36–66)
SEG NEUTROPHILS: 83 % (ref 36–65)
SEGMENTED NEUTROPHILS ABSOLUTE COUNT: 2.27 K/UL (ref 1.5–8.1)
SEGMENTED NEUTROPHILS ABSOLUTE COUNT: 2.5 K/UL (ref 1.5–8.1)
SEGMENTED NEUTROPHILS ABSOLUTE COUNT: 2.54 K/UL (ref 1.5–8.1)
SEGMENTED NEUTROPHILS ABSOLUTE COUNT: 2.59 K/UL (ref 1.5–8.1)
SEGMENTED NEUTROPHILS ABSOLUTE COUNT: 2.64 K/UL (ref 1.5–8.1)
SEGMENTED NEUTROPHILS ABSOLUTE COUNT: 2.68 K/UL (ref 1.5–8.1)
SEGMENTED NEUTROPHILS ABSOLUTE COUNT: 2.98 K/UL (ref 1.5–8.1)
SEGMENTED NEUTROPHILS ABSOLUTE COUNT: 3.05 K/UL (ref 1.5–8.1)
SEGMENTED NEUTROPHILS ABSOLUTE COUNT: 3.09 K/UL (ref 1.5–8.1)
SEGMENTED NEUTROPHILS ABSOLUTE COUNT: 3.5 K/UL (ref 1.5–8.1)
SEGMENTED NEUTROPHILS ABSOLUTE COUNT: 4.15 K/UL (ref 1.8–7.7)
SEGMENTED NEUTROPHILS ABSOLUTE COUNT: 4.26 K/UL (ref 1.8–7.7)
SEGMENTED NEUTROPHILS ABSOLUTE COUNT: 5.39 K/UL (ref 1.8–7.7)
SEGMENTED NEUTROPHILS ABSOLUTE COUNT: 5.94 K/UL (ref 1.5–8.1)
SODIUM BLD-SCNC: 131 MMOL/L (ref 135–144)
SODIUM BLD-SCNC: 134 MMOL/L (ref 135–144)
SODIUM BLD-SCNC: 134 MMOL/L (ref 135–144)
SODIUM BLD-SCNC: 135 MMOL/L (ref 135–144)
SODIUM BLD-SCNC: 138 MMOL/L (ref 135–144)
SODIUM BLD-SCNC: 139 MMOL/L (ref 135–144)
SODIUM BLD-SCNC: 141 MMOL/L (ref 135–144)
SODIUM BLD-SCNC: 144 MMOL/L (ref 135–144)
SODIUM BLD-SCNC: 145 MMOL/L (ref 135–144)
SODIUM BLD-SCNC: 147 MMOL/L (ref 135–144)
SODIUM BLD-SCNC: 148 MMOL/L (ref 135–144)
SODIUM BLD-SCNC: 149 MMOL/L (ref 135–144)
SODIUM BLD-SCNC: 151 MMOL/L (ref 135–144)
SOURCE: NORMAL
SPECIMEN DESCRIPTION: NORMAL
SPECIMEN DESCRIPTION: NORMAL
STATUS: NORMAL
STATUS: NORMAL
SURGICAL PATHOLOGY REPORT: NORMAL
TOTAL PROTEIN: 5.4 G/DL (ref 6.4–8.3)
TOTAL PROTEIN: 6 G/DL (ref 6.4–8.3)
TRIGL SERPL-MCNC: 459 MG/DL
TROPONIN INTERP: NORMAL
TROPONIN T: <0.03 NG/ML
WBC # BLD: 4.8 K/UL (ref 3.5–11.3)
WBC # BLD: 4.9 K/UL (ref 3.5–11.3)
WBC # BLD: 5.1 K/UL (ref 3.5–11.3)
WBC # BLD: 5.3 K/UL (ref 3.5–11.3)
WBC # BLD: 5.6 K/UL (ref 3.5–11.3)
WBC # BLD: 5.7 K/UL (ref 3.5–11.3)
WBC # BLD: 5.9 K/UL (ref 3.5–11.3)
WBC # BLD: 6.2 K/UL (ref 3.5–11.3)
WBC # BLD: 6.4 K/UL (ref 3.5–11.3)
WBC # BLD: 6.7 K/UL (ref 3.5–11.3)
WBC # BLD: 6.8 K/UL (ref 3.5–11.3)
WBC # BLD: 7 K/UL (ref 3.5–11.3)
WBC # BLD: 7.2 K/UL (ref 3.5–11.3)
WBC # BLD: 7.7 K/UL (ref 3.5–11.3)
WBC # BLD: 8 K/UL (ref 3.5–11.3)
WBC # BLD: ABNORMAL 10*3/UL
ZZ NTE CLEAN UP: ORDERED TEST: NORMAL
ZZ NTE WITH NAME CLEAN UP: SPECIMEN SOURCE: NORMAL

## 2018-01-01 PROCEDURE — 85025 COMPLETE CBC W/AUTO DIFF WBC: CPT

## 2018-01-01 PROCEDURE — 6360000002 HC RX W HCPCS: Performed by: STUDENT IN AN ORGANIZED HEALTH CARE EDUCATION/TRAINING PROGRAM

## 2018-01-01 PROCEDURE — 1200000000 HC SEMI PRIVATE

## 2018-01-01 PROCEDURE — 99232 SBSQ HOSP IP/OBS MODERATE 35: CPT | Performed by: INTERNAL MEDICINE

## 2018-01-01 PROCEDURE — C9290 INJ, BUPIVACAINE LIPOSOME: HCPCS | Performed by: SURGERY

## 2018-01-01 PROCEDURE — 2580000003 HC RX 258: Performed by: SURGERY

## 2018-01-01 PROCEDURE — 6370000000 HC RX 637 (ALT 250 FOR IP): Performed by: SURGERY

## 2018-01-01 PROCEDURE — 6370000000 HC RX 637 (ALT 250 FOR IP): Performed by: FAMILY MEDICINE

## 2018-01-01 PROCEDURE — 97162 PT EVAL MOD COMPLEX 30 MIN: CPT

## 2018-01-01 PROCEDURE — 6370000000 HC RX 637 (ALT 250 FOR IP): Performed by: STUDENT IN AN ORGANIZED HEALTH CARE EDUCATION/TRAINING PROGRAM

## 2018-01-01 PROCEDURE — 97530 THERAPEUTIC ACTIVITIES: CPT

## 2018-01-01 PROCEDURE — 2709999900 HC NON-CHARGEABLE SUPPLY: Performed by: SURGERY

## 2018-01-01 PROCEDURE — 87040 BLOOD CULTURE FOR BACTERIA: CPT

## 2018-01-01 PROCEDURE — 2500000003 HC RX 250 WO HCPCS: Performed by: INTERNAL MEDICINE

## 2018-01-01 PROCEDURE — 97161 PT EVAL LOW COMPLEX 20 MIN: CPT

## 2018-01-01 PROCEDURE — G8420 CALC BMI NORM PARAMETERS: HCPCS | Performed by: FAMILY MEDICINE

## 2018-01-01 PROCEDURE — 36415 COLL VENOUS BLD VENIPUNCTURE: CPT

## 2018-01-01 PROCEDURE — 2500000003 HC RX 250 WO HCPCS: Performed by: STUDENT IN AN ORGANIZED HEALTH CARE EDUCATION/TRAINING PROGRAM

## 2018-01-01 PROCEDURE — 2580000003 HC RX 258: Performed by: STUDENT IN AN ORGANIZED HEALTH CARE EDUCATION/TRAINING PROGRAM

## 2018-01-01 PROCEDURE — C1757 CATH, THROMBECTOMY/EMBOLECT: HCPCS | Performed by: SURGERY

## 2018-01-01 PROCEDURE — 93005 ELECTROCARDIOGRAM TRACING: CPT

## 2018-01-01 PROCEDURE — 3700000000 HC ANESTHESIA ATTENDED CARE: Performed by: SURGERY

## 2018-01-01 PROCEDURE — 05HY33Z INSERTION OF INFUSION DEVICE INTO UPPER VEIN, PERCUTANEOUS APPROACH: ICD-10-PCS | Performed by: FAMILY MEDICINE

## 2018-01-01 PROCEDURE — 94762 N-INVAS EAR/PLS OXIMTRY CONT: CPT

## 2018-01-01 PROCEDURE — 83735 ASSAY OF MAGNESIUM: CPT

## 2018-01-01 PROCEDURE — 6360000002 HC RX W HCPCS: Performed by: FAMILY MEDICINE

## 2018-01-01 PROCEDURE — 80048 BASIC METABOLIC PNL TOTAL CA: CPT

## 2018-01-01 PROCEDURE — 99212 OFFICE O/P EST SF 10 MIN: CPT

## 2018-01-01 PROCEDURE — 34201 REMOVAL OF ARTERY CLOT: CPT | Performed by: SURGERY

## 2018-01-01 PROCEDURE — 85610 PROTHROMBIN TIME: CPT

## 2018-01-01 PROCEDURE — 99232 SBSQ HOSP IP/OBS MODERATE 35: CPT | Performed by: FAMILY MEDICINE

## 2018-01-01 PROCEDURE — C1751 CATH, INF, PER/CENT/MIDLINE: HCPCS

## 2018-01-01 PROCEDURE — 3700000001 HC ADD 15 MINUTES (ANESTHESIA): Performed by: SURGERY

## 2018-01-01 PROCEDURE — 04CK0Z6 EXTIRPATION OF MATTER FROM R FEM ART, BIFURC, OPEN APPROACH: ICD-10-PCS | Performed by: SURGERY

## 2018-01-01 PROCEDURE — G8427 DOCREV CUR MEDS BY ELIG CLIN: HCPCS | Performed by: FAMILY MEDICINE

## 2018-01-01 PROCEDURE — 71045 X-RAY EXAM CHEST 1 VIEW: CPT

## 2018-01-01 PROCEDURE — 1036F TOBACCO NON-USER: CPT | Performed by: FAMILY MEDICINE

## 2018-01-01 PROCEDURE — 2580000003 HC RX 258: Performed by: FAMILY MEDICINE

## 2018-01-01 PROCEDURE — 6360000002 HC RX W HCPCS

## 2018-01-01 PROCEDURE — 99231 SBSQ HOSP IP/OBS SF/LOW 25: CPT | Performed by: INTERNAL MEDICINE

## 2018-01-01 PROCEDURE — 80177 DRUG SCRN QUAN LEVETIRACETAM: CPT

## 2018-01-01 PROCEDURE — 2500000003 HC RX 250 WO HCPCS: Performed by: NURSE ANESTHETIST, CERTIFIED REGISTERED

## 2018-01-01 PROCEDURE — 85613 RUSSELL VIPER VENOM DILUTED: CPT

## 2018-01-01 PROCEDURE — 84134 ASSAY OF PREALBUMIN: CPT

## 2018-01-01 PROCEDURE — 2500000003 HC RX 250 WO HCPCS: Performed by: FAMILY MEDICINE

## 2018-01-01 PROCEDURE — 6360000002 HC RX W HCPCS: Performed by: INTERNAL MEDICINE

## 2018-01-01 PROCEDURE — G8419 CALC BMI OUT NRM PARAM NOF/U: HCPCS | Performed by: FAMILY MEDICINE

## 2018-01-01 PROCEDURE — S0028 INJECTION, FAMOTIDINE, 20 MG: HCPCS | Performed by: STUDENT IN AN ORGANIZED HEALTH CARE EDUCATION/TRAINING PROGRAM

## 2018-01-01 PROCEDURE — 84100 ASSAY OF PHOSPHORUS: CPT

## 2018-01-01 PROCEDURE — 99213 OFFICE O/P EST LOW 20 MIN: CPT | Performed by: FAMILY MEDICINE

## 2018-01-01 PROCEDURE — 85014 HEMATOCRIT: CPT

## 2018-01-01 PROCEDURE — 99233 SBSQ HOSP IP/OBS HIGH 50: CPT | Performed by: FAMILY MEDICINE

## 2018-01-01 PROCEDURE — 82947 ASSAY GLUCOSE BLOOD QUANT: CPT

## 2018-01-01 PROCEDURE — C1769 GUIDE WIRE: HCPCS

## 2018-01-01 PROCEDURE — 84484 ASSAY OF TROPONIN QUANT: CPT

## 2018-01-01 PROCEDURE — 2580000003 HC RX 258: Performed by: NURSE ANESTHETIST, CERTIFIED REGISTERED

## 2018-01-01 PROCEDURE — 6360000002 HC RX W HCPCS: Performed by: EMERGENCY MEDICINE

## 2018-01-01 PROCEDURE — 6370000000 HC RX 637 (ALT 250 FOR IP): Performed by: INTERNAL MEDICINE

## 2018-01-01 PROCEDURE — 04CY0ZZ EXTIRPATION OF MATTER FROM LOWER ARTERY, OPEN APPROACH: ICD-10-PCS | Performed by: SURGERY

## 2018-01-01 PROCEDURE — 90688 IIV4 VACCINE SPLT 0.5 ML IM: CPT | Performed by: HOSPITALIST

## 2018-01-01 PROCEDURE — 99255 IP/OBS CONSLTJ NEW/EST HI 80: CPT | Performed by: SURGERY

## 2018-01-01 PROCEDURE — 76937 US GUIDE VASCULAR ACCESS: CPT

## 2018-01-01 PROCEDURE — 99213 OFFICE O/P EST LOW 20 MIN: CPT

## 2018-01-01 PROCEDURE — 97116 GAIT TRAINING THERAPY: CPT

## 2018-01-01 PROCEDURE — G8484 FLU IMMUNIZE NO ADMIN: HCPCS | Performed by: FAMILY MEDICINE

## 2018-01-01 PROCEDURE — 85306 CLOT INHIBIT PROT S FREE: CPT

## 2018-01-01 PROCEDURE — 3600000003 HC SURGERY LEVEL 3 BASE: Performed by: SURGERY

## 2018-01-01 PROCEDURE — 85730 THROMBOPLASTIN TIME PARTIAL: CPT

## 2018-01-01 PROCEDURE — 83880 ASSAY OF NATRIURETIC PEPTIDE: CPT

## 2018-01-01 PROCEDURE — 36569 INSJ PICC 5 YR+ W/O IMAGING: CPT

## 2018-01-01 PROCEDURE — 99211 OFF/OP EST MAY X REQ PHY/QHP: CPT

## 2018-01-01 PROCEDURE — 99239 HOSP IP/OBS DSCHRG MGMT >30: CPT | Performed by: FAMILY MEDICINE

## 2018-01-01 PROCEDURE — 2060000000 HC ICU INTERMEDIATE R&B

## 2018-01-01 PROCEDURE — 1111F DSCHRG MED/CURRENT MED MERGE: CPT | Performed by: FAMILY MEDICINE

## 2018-01-01 PROCEDURE — 3600000013 HC SURGERY LEVEL 3 ADDTL 15MIN: Performed by: SURGERY

## 2018-01-01 PROCEDURE — 90471 IMMUNIZATION ADMIN: CPT | Performed by: HOSPITALIST

## 2018-01-01 PROCEDURE — 93308 TTE F-UP OR LMTD: CPT

## 2018-01-01 PROCEDURE — 86140 C-REACTIVE PROTEIN: CPT

## 2018-01-01 PROCEDURE — 43752 NASAL/OROGASTRIC W/TUBE PLMT: CPT

## 2018-01-01 PROCEDURE — 93306 TTE W/DOPPLER COMPLETE: CPT

## 2018-01-01 PROCEDURE — G8978 MOBILITY CURRENT STATUS: HCPCS

## 2018-01-01 PROCEDURE — 85240 CLOT FACTOR VIII AHG 1 STAGE: CPT

## 2018-01-01 PROCEDURE — 99223 1ST HOSP IP/OBS HIGH 75: CPT | Performed by: FAMILY MEDICINE

## 2018-01-01 PROCEDURE — G8482 FLU IMMUNIZE ORDER/ADMIN: HCPCS | Performed by: FAMILY MEDICINE

## 2018-01-01 PROCEDURE — 87798 DETECT AGENT NOS DNA AMP: CPT

## 2018-01-01 PROCEDURE — 6370000000 HC RX 637 (ALT 250 FOR IP): Performed by: EMERGENCY MEDICINE

## 2018-01-01 PROCEDURE — 7100000001 HC PACU RECOVERY - ADDTL 15 MIN

## 2018-01-01 PROCEDURE — 94761 N-INVAS EAR/PLS OXIMETRY MLT: CPT

## 2018-01-01 PROCEDURE — 99285 EMERGENCY DEPT VISIT HI MDM: CPT

## 2018-01-01 PROCEDURE — 80053 COMPREHEN METABOLIC PANEL: CPT

## 2018-01-01 PROCEDURE — G8987 SELF CARE CURRENT STATUS: HCPCS

## 2018-01-01 PROCEDURE — 87633 RESP VIRUS 12-25 TARGETS: CPT

## 2018-01-01 PROCEDURE — 2500000003 HC RX 250 WO HCPCS: Performed by: SURGERY

## 2018-01-01 PROCEDURE — 85018 HEMOGLOBIN: CPT

## 2018-01-01 PROCEDURE — 87581 M.PNEUMON DNA AMP PROBE: CPT

## 2018-01-01 PROCEDURE — 6360000002 HC RX W HCPCS: Performed by: PHYSICIAN ASSISTANT

## 2018-01-01 PROCEDURE — 71260 CT THORAX DX C+: CPT

## 2018-01-01 PROCEDURE — 88304 TISSUE EXAM BY PATHOLOGIST: CPT

## 2018-01-01 PROCEDURE — 99254 IP/OBS CNSLTJ NEW/EST MOD 60: CPT | Performed by: INTERNAL MEDICINE

## 2018-01-01 PROCEDURE — 85027 COMPLETE CBC AUTOMATED: CPT

## 2018-01-01 PROCEDURE — 6360000002 HC RX W HCPCS: Performed by: SURGERY

## 2018-01-01 PROCEDURE — 99203 OFFICE O/P NEW LOW 30 MIN: CPT | Performed by: FAMILY MEDICINE

## 2018-01-01 PROCEDURE — 90715 TDAP VACCINE 7 YRS/> IM: CPT | Performed by: HOSPITALIST

## 2018-01-01 PROCEDURE — 85300 ANTITHROMBIN III ACTIVITY: CPT

## 2018-01-01 PROCEDURE — 85303 CLOT INHIBIT PROT C ACTIVITY: CPT

## 2018-01-01 PROCEDURE — 93970 EXTREMITY STUDY: CPT

## 2018-01-01 PROCEDURE — G8979 MOBILITY GOAL STATUS: HCPCS

## 2018-01-01 PROCEDURE — 74176 CT ABD & PELVIS W/O CONTRAST: CPT

## 2018-01-01 PROCEDURE — 84478 ASSAY OF TRIGLYCERIDES: CPT

## 2018-01-01 PROCEDURE — 96374 THER/PROPH/DIAG INJ IV PUSH: CPT

## 2018-01-01 PROCEDURE — 84145 PROCALCITONIN (PCT): CPT

## 2018-01-01 PROCEDURE — 90472 IMMUNIZATION ADMIN EACH ADD: CPT | Performed by: HOSPITALIST

## 2018-01-01 PROCEDURE — 6360000002 HC RX W HCPCS: Performed by: NURSE ANESTHETIST, CERTIFIED REGISTERED

## 2018-01-01 PROCEDURE — 93971 EXTREMITY STUDY: CPT

## 2018-01-01 PROCEDURE — 6360000004 HC RX CONTRAST MEDICATION: Performed by: STUDENT IN AN ORGANIZED HEALTH CARE EDUCATION/TRAINING PROGRAM

## 2018-01-01 PROCEDURE — 81240 F2 GENE: CPT

## 2018-01-01 PROCEDURE — 6370000000 HC RX 637 (ALT 250 FOR IP): Performed by: PHYSICIAN ASSISTANT

## 2018-01-01 PROCEDURE — 99253 IP/OBS CNSLTJ NEW/EST LOW 45: CPT | Performed by: INTERNAL MEDICINE

## 2018-01-01 PROCEDURE — G8988 SELF CARE GOAL STATUS: HCPCS

## 2018-01-01 PROCEDURE — 7100000000 HC PACU RECOVERY - FIRST 15 MIN

## 2018-01-01 PROCEDURE — 87486 CHLMYD PNEUM DNA AMP PROBE: CPT

## 2018-01-01 PROCEDURE — 71046 X-RAY EXAM CHEST 2 VIEWS: CPT

## 2018-01-01 PROCEDURE — 81241 F5 GENE: CPT

## 2018-01-01 PROCEDURE — 94760 N-INVAS EAR/PLS OXIMETRY 1: CPT

## 2018-01-01 PROCEDURE — 90732 PPSV23 VACC 2 YRS+ SUBQ/IM: CPT | Performed by: FAMILY MEDICINE

## 2018-01-01 PROCEDURE — 2709999900 HC NON-CHARGEABLE SUPPLY

## 2018-01-01 PROCEDURE — 70450 CT HEAD/BRAIN W/O DYE: CPT

## 2018-01-01 PROCEDURE — 86147 CARDIOLIPIN ANTIBODY EA IG: CPT

## 2018-01-01 RX ORDER — AMOXICILLIN AND CLAVULANATE POTASSIUM 600; 42.9 MG/5ML; MG/5ML
600 POWDER, FOR SUSPENSION ORAL 2 TIMES DAILY
Qty: 100 ML | Refills: 0 | Status: SHIPPED | OUTPATIENT
Start: 2018-01-01 | End: 2018-01-01

## 2018-01-01 RX ORDER — FUROSEMIDE 40 MG/5ML
40 SOLUTION ORAL 2 TIMES DAILY
Status: DISCONTINUED | OUTPATIENT
Start: 2018-01-01 | End: 2018-01-01

## 2018-01-01 RX ORDER — HEPARIN SODIUM 1000 [USP'U]/ML
80 INJECTION, SOLUTION INTRAVENOUS; SUBCUTANEOUS PRN
Status: DISCONTINUED | OUTPATIENT
Start: 2018-01-01 | End: 2018-01-01

## 2018-01-01 RX ORDER — HEPARIN SODIUM 1000 [USP'U]/ML
40 INJECTION, SOLUTION INTRAVENOUS; SUBCUTANEOUS PRN
Status: DISCONTINUED | OUTPATIENT
Start: 2018-01-01 | End: 2018-01-01

## 2018-01-01 RX ORDER — GUAIFENESIN 600 MG/1
600 TABLET, EXTENDED RELEASE ORAL 2 TIMES DAILY
Qty: 30 TABLET | Refills: 0 | Status: SHIPPED | OUTPATIENT
Start: 2018-01-01

## 2018-01-01 RX ORDER — BISOPROLOL FUMARATE 5 MG/1
2.5 TABLET ORAL DAILY
Status: DISCONTINUED | OUTPATIENT
Start: 2018-01-01 | End: 2018-01-01 | Stop reason: HOSPADM

## 2018-01-01 RX ORDER — HEPARIN SODIUM 1000 [USP'U]/ML
INJECTION, SOLUTION INTRAVENOUS; SUBCUTANEOUS PRN
Status: DISCONTINUED | OUTPATIENT
Start: 2018-01-01 | End: 2018-01-01 | Stop reason: SDUPTHER

## 2018-01-01 RX ORDER — HEPARIN SODIUM 1000 [USP'U]/ML
80 INJECTION, SOLUTION INTRAVENOUS; SUBCUTANEOUS ONCE
Status: COMPLETED | OUTPATIENT
Start: 2018-01-01 | End: 2018-01-01

## 2018-01-01 RX ORDER — NICOTINE POLACRILEX 4 MG
15 LOZENGE BUCCAL PRN
Status: DISCONTINUED | OUTPATIENT
Start: 2018-01-01 | End: 2018-01-01 | Stop reason: HOSPADM

## 2018-01-01 RX ORDER — FAMOTIDINE 20 MG/1
20 TABLET, FILM COATED ORAL 2 TIMES DAILY
Status: DISCONTINUED | OUTPATIENT
Start: 2018-01-01 | End: 2018-01-01

## 2018-01-01 RX ORDER — LORAZEPAM 2 MG/ML
0.5 INJECTION INTRAMUSCULAR EVERY 6 HOURS PRN
Status: DISCONTINUED | OUTPATIENT
Start: 2018-01-01 | End: 2018-01-01

## 2018-01-01 RX ORDER — HEPARIN SODIUM 1000 [USP'U]/ML
60 INJECTION, SOLUTION INTRAVENOUS; SUBCUTANEOUS ONCE
Status: DISCONTINUED | OUTPATIENT
Start: 2018-01-01 | End: 2018-01-01 | Stop reason: HOSPADM

## 2018-01-01 RX ORDER — MORPHINE SULFATE 2 MG/ML
2 INJECTION, SOLUTION INTRAMUSCULAR; INTRAVENOUS EVERY 6 HOURS
Status: DISCONTINUED | OUTPATIENT
Start: 2018-01-01 | End: 2018-01-01 | Stop reason: HOSPADM

## 2018-01-01 RX ORDER — BISACODYL 10 MG
10 SUPPOSITORY, RECTAL RECTAL DAILY PRN
Status: DISCONTINUED | OUTPATIENT
Start: 2018-01-01 | End: 2018-01-01

## 2018-01-01 RX ORDER — PROPOFOL 10 MG/ML
INJECTION, EMULSION INTRAVENOUS CONTINUOUS PRN
Status: DISCONTINUED | OUTPATIENT
Start: 2018-01-01 | End: 2018-01-01 | Stop reason: SDUPTHER

## 2018-01-01 RX ORDER — LIDOCAINE HYDROCHLORIDE 10 MG/ML
5 INJECTION, SOLUTION INFILTRATION; PERINEURAL ONCE
Status: DISCONTINUED | OUTPATIENT
Start: 2018-01-01 | End: 2018-01-01

## 2018-01-01 RX ORDER — ACETAMINOPHEN 160 MG/5ML
650 SOLUTION ORAL ONCE
Status: COMPLETED | OUTPATIENT
Start: 2018-01-01 | End: 2018-01-01

## 2018-01-01 RX ORDER — LEVETIRACETAM 100 MG/ML
10 SOLUTION ORAL 2 TIMES DAILY
COMMUNITY

## 2018-01-01 RX ORDER — LISINOPRIL 5 MG/1
5 TABLET ORAL DAILY
Qty: 30 TABLET | Refills: 3 | Status: ON HOLD | OUTPATIENT
Start: 2018-01-01 | End: 2018-01-01 | Stop reason: SDUPTHER

## 2018-01-01 RX ORDER — POTASSIUM CHLORIDE 20 MEQ/1
40 TABLET, EXTENDED RELEASE ORAL PRN
Status: DISCONTINUED | OUTPATIENT
Start: 2018-01-01 | End: 2018-01-01 | Stop reason: HOSPADM

## 2018-01-01 RX ORDER — HEPARIN SODIUM 10000 [USP'U]/100ML
12 INJECTION, SOLUTION INTRAVENOUS CONTINUOUS
Status: DISCONTINUED | OUTPATIENT
Start: 2018-01-01 | End: 2018-01-01 | Stop reason: HOSPADM

## 2018-01-01 RX ORDER — LORAZEPAM 1 MG/1
1 TABLET ORAL
Status: DISCONTINUED | OUTPATIENT
Start: 2018-01-01 | End: 2018-01-01 | Stop reason: HOSPADM

## 2018-01-01 RX ORDER — DEXTROSE MONOHYDRATE 25 G/50ML
12.5 INJECTION, SOLUTION INTRAVENOUS PRN
Status: DISCONTINUED | OUTPATIENT
Start: 2018-01-01 | End: 2018-01-01 | Stop reason: HOSPADM

## 2018-01-01 RX ORDER — WARFARIN SODIUM 5 MG/1
5 TABLET ORAL
Status: DISCONTINUED | OUTPATIENT
Start: 2018-01-01 | End: 2018-01-01 | Stop reason: HOSPADM

## 2018-01-01 RX ORDER — SODIUM CHLORIDE 0.9 % (FLUSH) 0.9 %
10 SYRINGE (ML) INJECTION PRN
Status: DISCONTINUED | OUTPATIENT
Start: 2018-01-01 | End: 2018-01-01 | Stop reason: HOSPADM

## 2018-01-01 RX ORDER — ALBUTEROL SULFATE 2.5 MG/3ML
2.5 SOLUTION RESPIRATORY (INHALATION) EVERY 6 HOURS PRN
Status: DISCONTINUED | OUTPATIENT
Start: 2018-01-01 | End: 2018-01-01 | Stop reason: HOSPADM

## 2018-01-01 RX ORDER — ACETAMINOPHEN 160 MG/5ML
650 SOLUTION ORAL EVERY 4 HOURS PRN
Status: DISCONTINUED | OUTPATIENT
Start: 2018-01-01 | End: 2018-01-01 | Stop reason: HOSPADM

## 2018-01-01 RX ORDER — HEPARIN SODIUM 1000 [USP'U]/ML
60 INJECTION, SOLUTION INTRAVENOUS; SUBCUTANEOUS PRN
Status: DISCONTINUED | OUTPATIENT
Start: 2018-01-01 | End: 2018-01-01 | Stop reason: HOSPADM

## 2018-01-01 RX ORDER — DEXTROSE AND SODIUM CHLORIDE 5; .45 G/100ML; G/100ML
INJECTION, SOLUTION INTRAVENOUS CONTINUOUS
Status: DISCONTINUED | OUTPATIENT
Start: 2018-01-01 | End: 2018-01-01

## 2018-01-01 RX ORDER — FUROSEMIDE 10 MG/ML
20 INJECTION INTRAMUSCULAR; INTRAVENOUS ONCE
Status: DISCONTINUED | OUTPATIENT
Start: 2018-01-01 | End: 2018-01-01

## 2018-01-01 RX ORDER — CETIRIZINE HYDROCHLORIDE 1 MG/ML
10 SOLUTION ORAL DAILY
Qty: 300 ML | Refills: 1 | Status: SHIPPED | OUTPATIENT
Start: 2018-01-01

## 2018-01-01 RX ORDER — FUROSEMIDE 10 MG/ML
20 INJECTION INTRAMUSCULAR; INTRAVENOUS DAILY
Status: DISCONTINUED | OUTPATIENT
Start: 2018-01-01 | End: 2018-01-01

## 2018-01-01 RX ORDER — WARFARIN SODIUM 5 MG/1
5 TABLET ORAL
Status: COMPLETED | OUTPATIENT
Start: 2018-01-01 | End: 2018-01-01

## 2018-01-01 RX ORDER — LISINOPRIL 5 MG/1
TABLET ORAL
Qty: 30 TABLET | Refills: 3 | Status: SHIPPED | OUTPATIENT
Start: 2018-01-01

## 2018-01-01 RX ORDER — HEPARIN SODIUM 10000 [USP'U]/100ML
18 INJECTION, SOLUTION INTRAVENOUS CONTINUOUS
Status: DISCONTINUED | OUTPATIENT
Start: 2018-01-01 | End: 2018-01-01

## 2018-01-01 RX ORDER — MORPHINE SULFATE 4 MG/ML
4 INJECTION, SOLUTION INTRAMUSCULAR; INTRAVENOUS
Status: DISCONTINUED | OUTPATIENT
Start: 2018-01-01 | End: 2018-01-01

## 2018-01-01 RX ORDER — WARFARIN SODIUM 1 MG/1
1 TABLET ORAL DAILY
Status: DISCONTINUED | OUTPATIENT
Start: 2018-01-01 | End: 2018-01-01

## 2018-01-01 RX ORDER — BISACODYL 10 MG
10 SUPPOSITORY, RECTAL RECTAL ONCE
Status: COMPLETED | OUTPATIENT
Start: 2018-01-01 | End: 2018-01-01

## 2018-01-01 RX ORDER — LEVETIRACETAM 100 MG/ML
10 SOLUTION ORAL 2 TIMES DAILY
Status: DISCONTINUED | OUTPATIENT
Start: 2018-01-01 | End: 2018-01-01

## 2018-01-01 RX ORDER — SODIUM CHLORIDE 0.9 % (FLUSH) 0.9 %
10 SYRINGE (ML) INJECTION EVERY 12 HOURS SCHEDULED
Status: DISCONTINUED | OUTPATIENT
Start: 2018-01-01 | End: 2018-01-01 | Stop reason: HOSPADM

## 2018-01-01 RX ORDER — HYDROCODONE BITARTRATE AND ACETAMINOPHEN 5; 325 MG/1; MG/1
1 TABLET ORAL EVERY 4 HOURS PRN
Status: DISCONTINUED | OUTPATIENT
Start: 2018-01-01 | End: 2018-01-01

## 2018-01-01 RX ORDER — BENZONATATE 100 MG/1
100 CAPSULE ORAL 3 TIMES DAILY PRN
Qty: 21 CAPSULE | Refills: 0 | Status: SHIPPED | OUTPATIENT
Start: 2018-01-01 | End: 2018-01-01

## 2018-01-01 RX ORDER — LORAZEPAM 2 MG/ML
0.5 INJECTION INTRAMUSCULAR EVERY 4 HOURS PRN
Status: DISCONTINUED | OUTPATIENT
Start: 2018-01-01 | End: 2018-01-01 | Stop reason: HOSPADM

## 2018-01-01 RX ORDER — AMOXICILLIN AND CLAVULANATE POTASSIUM 875; 125 MG/1; MG/1
1 TABLET, FILM COATED ORAL 2 TIMES DAILY
Status: ON HOLD | COMMUNITY
End: 2018-01-01 | Stop reason: HOSPADM

## 2018-01-01 RX ORDER — WARFARIN SODIUM 5 MG/1
TABLET ORAL
Qty: 30 TABLET | Refills: 3 | Status: SHIPPED | OUTPATIENT
Start: 2018-01-01 | End: 2018-01-01 | Stop reason: DRUGHIGH

## 2018-01-01 RX ORDER — LISINOPRIL 10 MG/1
5 TABLET ORAL DAILY
Status: DISCONTINUED | OUTPATIENT
Start: 2018-01-01 | End: 2018-01-01 | Stop reason: HOSPADM

## 2018-01-01 RX ORDER — ACETAMINOPHEN 325 MG/1
650 TABLET ORAL EVERY 4 HOURS PRN
Status: DISCONTINUED | OUTPATIENT
Start: 2018-01-01 | End: 2018-01-01

## 2018-01-01 RX ORDER — HYDROCODONE BITARTRATE AND ACETAMINOPHEN 5; 325 MG/1; MG/1
2 TABLET ORAL EVERY 4 HOURS PRN
Status: DISCONTINUED | OUTPATIENT
Start: 2018-01-01 | End: 2018-01-01

## 2018-01-01 RX ORDER — MAGNESIUM HYDROXIDE/ALUMINUM HYDROXICE/SIMETHICONE 120; 1200; 1200 MG/30ML; MG/30ML; MG/30ML
30 SUSPENSION ORAL ONCE
Status: COMPLETED | OUTPATIENT
Start: 2018-01-01 | End: 2018-01-01

## 2018-01-01 RX ORDER — LISINOPRIL 10 MG/1
10 TABLET ORAL ONCE
Status: COMPLETED | OUTPATIENT
Start: 2018-01-01 | End: 2018-01-01

## 2018-01-01 RX ORDER — BISOPROLOL FUMARATE 5 MG/1
2.5 TABLET ORAL DAILY
Status: DISCONTINUED | OUTPATIENT
Start: 2018-01-01 | End: 2018-01-01

## 2018-01-01 RX ORDER — FUROSEMIDE 10 MG/ML
20 INJECTION INTRAMUSCULAR; INTRAVENOUS ONCE
Status: COMPLETED | OUTPATIENT
Start: 2018-01-01 | End: 2018-01-01

## 2018-01-01 RX ORDER — HYDROMORPHONE HYDROCHLORIDE 2 MG/1
1 TABLET ORAL EVERY 4 HOURS PRN
Status: DISCONTINUED | OUTPATIENT
Start: 2018-01-01 | End: 2018-01-01 | Stop reason: HOSPADM

## 2018-01-01 RX ORDER — MAGNESIUM HYDROXIDE 1200 MG/15ML
LIQUID ORAL CONTINUOUS PRN
Status: COMPLETED | OUTPATIENT
Start: 2018-01-01 | End: 2018-01-01

## 2018-01-01 RX ORDER — POTASSIUM CHLORIDE 20MEQ/15ML
40 LIQUID (ML) ORAL PRN
Status: DISCONTINUED | OUTPATIENT
Start: 2018-01-01 | End: 2018-01-01 | Stop reason: HOSPADM

## 2018-01-01 RX ORDER — ETOMIDATE 2 MG/ML
INJECTION INTRAVENOUS PRN
Status: DISCONTINUED | OUTPATIENT
Start: 2018-01-01 | End: 2018-01-01 | Stop reason: SDUPTHER

## 2018-01-01 RX ORDER — CYPROHEPTADINE HYDROCHLORIDE 2 MG/5ML
2 SOLUTION ORAL EVERY 6 HOURS
Status: DISCONTINUED | OUTPATIENT
Start: 2018-01-01 | End: 2018-01-01 | Stop reason: HOSPADM

## 2018-01-01 RX ORDER — AMOXICILLIN AND CLAVULANATE POTASSIUM 400; 57 MG/5ML; MG/5ML
400 POWDER, FOR SUSPENSION ORAL 2 TIMES DAILY
Qty: 100 ML | Refills: 0 | Status: SHIPPED | OUTPATIENT
Start: 2018-01-01 | End: 2018-01-01

## 2018-01-01 RX ORDER — GUAIFENESIN/DEXTROMETHORPHAN 100-10MG/5
5 SYRUP ORAL 3 TIMES DAILY PRN
Qty: 120 ML | Refills: 0 | Status: SHIPPED | OUTPATIENT
Start: 2018-01-01 | End: 2018-01-01

## 2018-01-01 RX ORDER — WARFARIN SODIUM 2.5 MG/1
TABLET ORAL
Qty: 30 TABLET | Refills: 1 | OUTPATIENT
Start: 2018-01-01 | End: 2018-01-01 | Stop reason: DRUGHIGH

## 2018-01-01 RX ORDER — LISINOPRIL 5 MG/1
5 TABLET ORAL DAILY
Status: DISCONTINUED | OUTPATIENT
Start: 2018-01-01 | End: 2018-01-01 | Stop reason: HOSPADM

## 2018-01-01 RX ORDER — DIPHENHYDRAMINE HCL 12.5MG/5ML
25 LIQUID (ML) ORAL EVERY 6 HOURS PRN
Status: DISCONTINUED | OUTPATIENT
Start: 2018-01-01 | End: 2018-01-01 | Stop reason: HOSPADM

## 2018-01-01 RX ORDER — LORAZEPAM 2 MG/ML
0.5 INJECTION INTRAMUSCULAR NIGHTLY PRN
Status: DISCONTINUED | OUTPATIENT
Start: 2018-01-01 | End: 2018-01-01

## 2018-01-01 RX ORDER — DEXTROSE MONOHYDRATE 50 MG/ML
100 INJECTION, SOLUTION INTRAVENOUS PRN
Status: DISCONTINUED | OUTPATIENT
Start: 2018-01-01 | End: 2018-01-01 | Stop reason: HOSPADM

## 2018-01-01 RX ORDER — LEVOFLOXACIN 5 MG/ML
500 INJECTION, SOLUTION INTRAVENOUS EVERY 24 HOURS
Status: DISCONTINUED | OUTPATIENT
Start: 2018-01-01 | End: 2018-01-01

## 2018-01-01 RX ORDER — DIPHENHYDRAMINE HCL 25 MG
25 TABLET ORAL ONCE
Status: COMPLETED | OUTPATIENT
Start: 2018-01-01 | End: 2018-01-01

## 2018-01-01 RX ORDER — MORPHINE SULFATE 2 MG/ML
2 INJECTION, SOLUTION INTRAMUSCULAR; INTRAVENOUS
Status: DISCONTINUED | OUTPATIENT
Start: 2018-01-01 | End: 2018-01-01

## 2018-01-01 RX ORDER — WARFARIN SODIUM 10 MG/1
10 TABLET ORAL
Status: COMPLETED | OUTPATIENT
Start: 2018-01-01 | End: 2018-01-01

## 2018-01-01 RX ORDER — MEGESTROL ACETATE 40 MG/ML
240 SUSPENSION ORAL 2 TIMES DAILY
Qty: 240 ML | Refills: 1 | Status: SHIPPED | OUTPATIENT
Start: 2018-01-01 | End: 2018-01-01 | Stop reason: ALTCHOICE

## 2018-01-01 RX ORDER — WARFARIN SODIUM 1 MG/1
TABLET ORAL
Qty: 30 TABLET | Refills: 1 | Status: SHIPPED | OUTPATIENT
Start: 2018-01-01 | End: 2018-01-01 | Stop reason: SDUPTHER

## 2018-01-01 RX ORDER — LORAZEPAM 1 MG/1
1 TABLET ORAL EVERY 4 HOURS PRN
Status: DISCONTINUED | OUTPATIENT
Start: 2018-01-01 | End: 2018-01-01

## 2018-01-01 RX ORDER — ALBUTEROL SULFATE 2.5 MG/3ML
2.5 SOLUTION RESPIRATORY (INHALATION) EVERY 6 HOURS PRN
Qty: 120 EACH | Refills: 1 | Status: SHIPPED | OUTPATIENT
Start: 2018-01-01

## 2018-01-01 RX ORDER — PROMETHAZINE HYDROCHLORIDE 25 MG/ML
12.5 INJECTION, SOLUTION INTRAMUSCULAR; INTRAVENOUS EVERY 4 HOURS PRN
Status: DISCONTINUED | OUTPATIENT
Start: 2018-01-01 | End: 2018-01-01 | Stop reason: HOSPADM

## 2018-01-01 RX ORDER — HEPARIN SODIUM 1000 [USP'U]/ML
30 INJECTION, SOLUTION INTRAVENOUS; SUBCUTANEOUS PRN
Status: DISCONTINUED | OUTPATIENT
Start: 2018-01-01 | End: 2018-01-01 | Stop reason: HOSPADM

## 2018-01-01 RX ORDER — LISINOPRIL 2.5 MG/1
2.5 TABLET ORAL DAILY
Status: ON HOLD | COMMUNITY
End: 2018-01-01 | Stop reason: HOSPADM

## 2018-01-01 RX ORDER — CYPROHEPTADINE HYDROCHLORIDE 2 MG/5ML
2 SOLUTION ORAL 4 TIMES DAILY
Status: DISCONTINUED | OUTPATIENT
Start: 2018-01-01 | End: 2018-01-01

## 2018-01-01 RX ORDER — POTASSIUM CHLORIDE 7.45 MG/ML
10 INJECTION INTRAVENOUS PRN
Status: DISCONTINUED | OUTPATIENT
Start: 2018-01-01 | End: 2018-01-01 | Stop reason: HOSPADM

## 2018-01-01 RX ORDER — HEPARIN SODIUM 5000 [USP'U]/ML
5000 INJECTION, SOLUTION INTRAVENOUS; SUBCUTANEOUS ONCE
Status: COMPLETED | OUTPATIENT
Start: 2018-01-01 | End: 2018-01-01

## 2018-01-01 RX ORDER — SALICYLIC ACID 25 MG/ML
GEL TOPICAL
Qty: 120 ML | Refills: 5 | Status: SHIPPED | OUTPATIENT
Start: 2018-01-01

## 2018-01-01 RX ORDER — LEVETIRACETAM 100 MG/ML
500 SOLUTION ORAL 2 TIMES DAILY
Status: DISCONTINUED | OUTPATIENT
Start: 2018-01-01 | End: 2018-01-01 | Stop reason: HOSPADM

## 2018-01-01 RX ORDER — ASPIRIN 81 MG/1
81 TABLET, CHEWABLE ORAL DAILY
Status: ON HOLD | COMMUNITY
End: 2018-01-01 | Stop reason: HOSPADM

## 2018-01-01 RX ORDER — CARVEDILOL 3.12 MG/1
3.12 TABLET ORAL 2 TIMES DAILY WITH MEALS
COMMUNITY

## 2018-01-01 RX ORDER — PROMETHAZINE HYDROCHLORIDE 25 MG/ML
6.25 INJECTION, SOLUTION INTRAMUSCULAR; INTRAVENOUS ONCE
Status: COMPLETED | OUTPATIENT
Start: 2018-01-01 | End: 2018-01-01

## 2018-01-01 RX ORDER — FUROSEMIDE 10 MG/ML
20 INJECTION INTRAMUSCULAR; INTRAVENOUS 2 TIMES DAILY
Status: DISCONTINUED | OUTPATIENT
Start: 2018-01-01 | End: 2018-01-01 | Stop reason: HOSPADM

## 2018-01-01 RX ORDER — LORAZEPAM 0.5 MG/1
0.5 TABLET ORAL EVERY 6 HOURS PRN
Status: DISCONTINUED | OUTPATIENT
Start: 2018-01-01 | End: 2018-01-01

## 2018-01-01 RX ORDER — POTASSIUM CHLORIDE 750 MG/1
10 CAPSULE, EXTENDED RELEASE ORAL DAILY
COMMUNITY

## 2018-01-01 RX ORDER — ASPIRIN 81 MG/1
81 TABLET, CHEWABLE ORAL DAILY
Status: DISCONTINUED | OUTPATIENT
Start: 2018-01-01 | End: 2018-01-01 | Stop reason: HOSPADM

## 2018-01-01 RX ORDER — DIAPER,BRIEF,ADULT, DISPOSABLE
EACH MISCELLANEOUS
Qty: 5 EACH | Refills: 5 | Status: SHIPPED | OUTPATIENT
Start: 2018-01-01

## 2018-01-01 RX ORDER — DEXTROSE MONOHYDRATE 50 MG/ML
INJECTION, SOLUTION INTRAVENOUS CONTINUOUS
Status: DISCONTINUED | OUTPATIENT
Start: 2018-01-01 | End: 2018-01-01

## 2018-01-01 RX ORDER — WARFARIN SODIUM 1 MG/1
TABLET ORAL
Qty: 30 TABLET | Refills: 5 | Status: SHIPPED | OUTPATIENT
Start: 2018-01-01

## 2018-01-01 RX ORDER — BISOPROLOL FUMARATE 5 MG/1
5 TABLET ORAL DAILY
Status: DISCONTINUED | OUTPATIENT
Start: 2018-01-01 | End: 2018-01-01

## 2018-01-01 RX ORDER — FUROSEMIDE 20 MG/1
20 TABLET ORAL DAILY PRN
COMMUNITY

## 2018-01-01 RX ORDER — IBUPROFEN 400 MG/1
400 TABLET ORAL EVERY 6 HOURS PRN
Status: DISCONTINUED | OUTPATIENT
Start: 2018-01-01 | End: 2018-01-01

## 2018-01-01 RX ORDER — WARFARIN SODIUM 2.5 MG/1
TABLET ORAL
Qty: 30 TABLET | Refills: 1 | Status: SHIPPED | OUTPATIENT
Start: 2018-01-01

## 2018-01-01 RX ORDER — FENTANYL CITRATE 50 UG/ML
INJECTION, SOLUTION INTRAMUSCULAR; INTRAVENOUS PRN
Status: DISCONTINUED | OUTPATIENT
Start: 2018-01-01 | End: 2018-01-01 | Stop reason: SDUPTHER

## 2018-01-01 RX ORDER — ONDANSETRON 2 MG/ML
4 INJECTION INTRAMUSCULAR; INTRAVENOUS EVERY 6 HOURS PRN
Status: DISCONTINUED | OUTPATIENT
Start: 2018-01-01 | End: 2018-01-01 | Stop reason: HOSPADM

## 2018-01-01 RX ORDER — SODIUM CHLORIDE 9 MG/ML
INJECTION, SOLUTION INTRAVENOUS CONTINUOUS
Status: DISCONTINUED | OUTPATIENT
Start: 2018-01-01 | End: 2018-01-01

## 2018-01-01 RX ORDER — CARVEDILOL 3.12 MG/1
3.12 TABLET ORAL 2 TIMES DAILY WITH MEALS
Status: DISCONTINUED | OUTPATIENT
Start: 2018-01-01 | End: 2018-01-01 | Stop reason: HOSPADM

## 2018-01-01 RX ORDER — MORPHINE SULFATE 2 MG/ML
1 INJECTION, SOLUTION INTRAMUSCULAR; INTRAVENOUS
Status: DISCONTINUED | OUTPATIENT
Start: 2018-01-01 | End: 2018-01-01 | Stop reason: HOSPADM

## 2018-01-01 RX ORDER — BUMETANIDE 0.25 MG/ML
2 INJECTION, SOLUTION INTRAMUSCULAR; INTRAVENOUS ONCE
Status: COMPLETED | OUTPATIENT
Start: 2018-01-01 | End: 2018-01-01

## 2018-01-01 RX ORDER — SODIUM CHLORIDE, SODIUM LACTATE, POTASSIUM CHLORIDE, CALCIUM CHLORIDE 600; 310; 30; 20 MG/100ML; MG/100ML; MG/100ML; MG/100ML
INJECTION, SOLUTION INTRAVENOUS CONTINUOUS PRN
Status: DISCONTINUED | OUTPATIENT
Start: 2018-01-01 | End: 2018-01-01 | Stop reason: SDUPTHER

## 2018-01-01 RX ORDER — LORAZEPAM 2 MG/ML
0.5 INJECTION INTRAMUSCULAR ONCE
Status: COMPLETED | OUTPATIENT
Start: 2018-01-01 | End: 2018-01-01

## 2018-01-01 RX ORDER — FUROSEMIDE 10 MG/ML
20 INJECTION INTRAMUSCULAR; INTRAVENOUS 2 TIMES DAILY
Status: DISCONTINUED | OUTPATIENT
Start: 2018-01-01 | End: 2018-01-01

## 2018-01-01 RX ORDER — HEPARIN SODIUM 1000 [USP'U]/ML
INJECTION, SOLUTION INTRAVENOUS; SUBCUTANEOUS PRN
Status: DISCONTINUED | OUTPATIENT
Start: 2018-01-01 | End: 2018-01-01 | Stop reason: HOSPADM

## 2018-01-01 RX ORDER — BISOPROLOL FUMARATE 5 MG/1
2.5 TABLET ORAL DAILY
COMMUNITY

## 2018-01-01 RX ORDER — KETOROLAC TROMETHAMINE 30 MG/ML
30 INJECTION, SOLUTION INTRAMUSCULAR; INTRAVENOUS ONCE
Status: COMPLETED | OUTPATIENT
Start: 2018-01-01 | End: 2018-01-01

## 2018-01-01 RX ORDER — DEXTROMETHORPHAN HBR, GUAIFENSIN 5; 100 MG/5ML; MG/5ML
LIQUID ORAL
Qty: 118 ML | Refills: 3 | Status: SHIPPED | OUTPATIENT
Start: 2018-01-01

## 2018-01-01 RX ORDER — LIDOCAINE HYDROCHLORIDE 10 MG/ML
INJECTION, SOLUTION INFILTRATION; PERINEURAL PRN
Status: DISCONTINUED | OUTPATIENT
Start: 2018-01-01 | End: 2018-01-01 | Stop reason: HOSPADM

## 2018-01-01 RX ADMIN — ACETAMINOPHEN 650 MG: 650 SOLUTION ORAL at 11:45

## 2018-01-01 RX ADMIN — LISINOPRIL 5 MG: 10 TABLET ORAL at 09:30

## 2018-01-01 RX ADMIN — MORPHINE SULFATE 2 MG: 2 INJECTION, SOLUTION INTRAMUSCULAR; INTRAVENOUS at 02:30

## 2018-01-01 RX ADMIN — ASPIRIN 81 MG: 81 TABLET, CHEWABLE ORAL at 10:45

## 2018-01-01 RX ADMIN — LEVETIRACETAM 567 MG: 500 SOLUTION ORAL at 00:38

## 2018-01-01 RX ADMIN — FAMOTIDINE 20 MG: 10 INJECTION, SOLUTION INTRAVENOUS at 09:48

## 2018-01-01 RX ADMIN — APIXABAN 5 MG: 5 TABLET, FILM COATED ORAL at 20:38

## 2018-01-01 RX ADMIN — LORAZEPAM 0.5 MG: 2 INJECTION INTRAMUSCULAR; INTRAVENOUS at 06:35

## 2018-01-01 RX ADMIN — MORPHINE SULFATE 1 MG: 2 INJECTION, SOLUTION INTRAMUSCULAR; INTRAVENOUS at 15:20

## 2018-01-01 RX ADMIN — ENOXAPARIN SODIUM 40 MG: 40 INJECTION SUBCUTANEOUS at 08:50

## 2018-01-01 RX ADMIN — DIPHENHYDRAMINE HCL 25 MG: 25 TABLET ORAL at 17:15

## 2018-01-01 RX ADMIN — LISINOPRIL 5 MG: 10 TABLET ORAL at 10:36

## 2018-01-01 RX ADMIN — LEVETIRACETAM 500 MG: 500 SOLUTION ORAL at 09:32

## 2018-01-01 RX ADMIN — CARVEDILOL 3.12 MG: 3.12 TABLET, FILM COATED ORAL at 17:03

## 2018-01-01 RX ADMIN — LEVETIRACETAM 567 MG: 500 SOLUTION ORAL at 10:35

## 2018-01-01 RX ADMIN — PROMETHAZINE HYDROCHLORIDE 6.25 MG: 25 INJECTION, SOLUTION INTRAMUSCULAR; INTRAVENOUS at 00:58

## 2018-01-01 RX ADMIN — MAGNESIUM SULFATE HEPTAHYDRATE: 500 INJECTION, SOLUTION INTRAMUSCULAR; INTRAVENOUS at 20:26

## 2018-01-01 RX ADMIN — MORPHINE SULFATE 4 MG: 4 INJECTION INTRAVENOUS at 01:12

## 2018-01-01 RX ADMIN — BISOPROLOL FUMARATE 2.5 MG: 5 TABLET ORAL at 13:00

## 2018-01-01 RX ADMIN — FAMOTIDINE 20 MG: 10 INJECTION, SOLUTION INTRAVENOUS at 10:58

## 2018-01-01 RX ADMIN — MORPHINE SULFATE 2 MG: 2 INJECTION, SOLUTION INTRAMUSCULAR; INTRAVENOUS at 07:37

## 2018-01-01 RX ADMIN — FAMOTIDINE 20 MG: 10 INJECTION, SOLUTION INTRAVENOUS at 10:04

## 2018-01-01 RX ADMIN — DEXTROSE MONOHYDRATE 12.5 G: 25 INJECTION, SOLUTION INTRAVENOUS at 08:33

## 2018-01-01 RX ADMIN — MORPHINE SULFATE 2 MG: 2 INJECTION, SOLUTION INTRAMUSCULAR; INTRAVENOUS at 14:27

## 2018-01-01 RX ADMIN — FUROSEMIDE 20 MG: 10 INJECTION, SOLUTION INTRAMUSCULAR; INTRAVENOUS at 00:14

## 2018-01-01 RX ADMIN — FUROSEMIDE 20 MG: 10 INJECTION, SOLUTION INTRAMUSCULAR; INTRAVENOUS at 09:33

## 2018-01-01 RX ADMIN — Medication 10 ML: at 12:52

## 2018-01-01 RX ADMIN — MORPHINE SULFATE 4 MG: 4 INJECTION INTRAVENOUS at 01:30

## 2018-01-01 RX ADMIN — LEVETIRACETAM 567 MG: 500 INJECTION, SOLUTION, CONCENTRATE INTRAVENOUS at 05:00

## 2018-01-01 RX ADMIN — Medication 10 ML: at 21:35

## 2018-01-01 RX ADMIN — LISINOPRIL 5 MG: 10 TABLET ORAL at 11:33

## 2018-01-01 RX ADMIN — HEPARIN SODIUM 3590 UNITS: 1000 INJECTION, SOLUTION INTRAVENOUS; SUBCUTANEOUS at 07:42

## 2018-01-01 RX ADMIN — DIPHENHYDRAMINE HYDROCHLORIDE 25 MG: 25 SOLUTION ORAL at 09:04

## 2018-01-01 RX ADMIN — MORPHINE SULFATE 4 MG: 4 INJECTION INTRAVENOUS at 03:00

## 2018-01-01 RX ADMIN — ACETAMINOPHEN 650 MG: 650 SOLUTION ORAL at 10:24

## 2018-01-01 RX ADMIN — Medication 10 ML: at 09:13

## 2018-01-01 RX ADMIN — LEVETIRACETAM 500 MG: 500 SOLUTION ORAL at 21:18

## 2018-01-01 RX ADMIN — KETOROLAC TROMETHAMINE 30 MG: 30 INJECTION, SOLUTION INTRAMUSCULAR at 12:52

## 2018-01-01 RX ADMIN — ETOMIDATE 10 MG: 2 INJECTION INTRAVENOUS at 11:35

## 2018-01-01 RX ADMIN — CARVEDILOL 3.12 MG: 3.12 TABLET, FILM COATED ORAL at 09:59

## 2018-01-01 RX ADMIN — LEVETIRACETAM 500 MG: 500 SOLUTION ORAL at 09:59

## 2018-01-01 RX ADMIN — LEVETIRACETAM 567 MG: 500 INJECTION, SOLUTION, CONCENTRATE INTRAVENOUS at 04:50

## 2018-01-01 RX ADMIN — MORPHINE SULFATE 2 MG: 2 INJECTION, SOLUTION INTRAMUSCULAR; INTRAVENOUS at 18:06

## 2018-01-01 RX ADMIN — MORPHINE SULFATE 1 MG: 2 INJECTION, SOLUTION INTRAMUSCULAR; INTRAVENOUS at 03:22

## 2018-01-01 RX ADMIN — HEPARIN SODIUM 1800 UNITS: 1000 INJECTION, SOLUTION INTRAVENOUS; SUBCUTANEOUS at 13:32

## 2018-01-01 RX ADMIN — MORPHINE SULFATE 4 MG: 4 INJECTION INTRAVENOUS at 23:19

## 2018-01-01 RX ADMIN — LEVETIRACETAM 567 MG: 500 SOLUTION ORAL at 21:30

## 2018-01-01 RX ADMIN — ACETAMINOPHEN 650 MG: 325 TABLET ORAL at 17:15

## 2018-01-01 RX ADMIN — MORPHINE SULFATE 1 MG: 2 INJECTION, SOLUTION INTRAMUSCULAR; INTRAVENOUS at 09:21

## 2018-01-01 RX ADMIN — Medication 10 ML: at 11:34

## 2018-01-01 RX ADMIN — PHENYLEPHRINE HYDROCHLORIDE 100 MCG: 10 INJECTION INTRAVENOUS at 12:02

## 2018-01-01 RX ADMIN — I.V. FAT EMULSION 250 ML: 20 EMULSION INTRAVENOUS at 20:38

## 2018-01-01 RX ADMIN — FAMOTIDINE 20 MG: 10 INJECTION, SOLUTION INTRAVENOUS at 22:09

## 2018-01-01 RX ADMIN — LEVETIRACETAM 567 MG: 500 INJECTION, SOLUTION, CONCENTRATE INTRAVENOUS at 17:14

## 2018-01-01 RX ADMIN — MORPHINE SULFATE 2 MG: 2 INJECTION, SOLUTION INTRAMUSCULAR; INTRAVENOUS at 20:03

## 2018-01-01 RX ADMIN — Medication 10 ML: at 21:30

## 2018-01-01 RX ADMIN — Medication 10 ML: at 20:03

## 2018-01-01 RX ADMIN — BISOPROLOL FUMARATE 2.5 MG: 5 TABLET ORAL at 09:32

## 2018-01-01 RX ADMIN — FENTANYL CITRATE 25 MCG: 50 INJECTION, SOLUTION INTRAMUSCULAR; INTRAVENOUS at 12:28

## 2018-01-01 RX ADMIN — Medication 2 MG: at 22:20

## 2018-01-01 RX ADMIN — HEPARIN SODIUM 1800 UNITS: 1000 INJECTION, SOLUTION INTRAVENOUS; SUBCUTANEOUS at 04:00

## 2018-01-01 RX ADMIN — Medication 2 G: at 11:43

## 2018-01-01 RX ADMIN — IBUPROFEN 400 MG: 100 SUSPENSION ORAL at 10:39

## 2018-01-01 RX ADMIN — LEVETIRACETAM 500 MG: 500 SOLUTION ORAL at 22:27

## 2018-01-01 RX ADMIN — FAMOTIDINE 20 MG: 20 TABLET, FILM COATED ORAL at 22:37

## 2018-01-01 RX ADMIN — FAMOTIDINE 20 MG: 10 INJECTION, SOLUTION INTRAVENOUS at 09:34

## 2018-01-01 RX ADMIN — PHENYLEPHRINE HYDROCHLORIDE 100 MCG: 10 INJECTION INTRAVENOUS at 12:48

## 2018-01-01 RX ADMIN — IBUPROFEN 400 MG: 400 TABLET, FILM COATED ORAL at 09:37

## 2018-01-01 RX ADMIN — LISINOPRIL 5 MG: 5 TABLET ORAL at 10:45

## 2018-01-01 RX ADMIN — IOPAMIDOL 75 ML: 755 INJECTION, SOLUTION INTRAVENOUS at 17:00

## 2018-01-01 RX ADMIN — FAMOTIDINE 20 MG: 10 INJECTION, SOLUTION INTRAVENOUS at 22:12

## 2018-01-01 RX ADMIN — CARVEDILOL 3.12 MG: 3.12 TABLET, FILM COATED ORAL at 09:14

## 2018-01-01 RX ADMIN — MORPHINE SULFATE 2 MG: 2 INJECTION, SOLUTION INTRAMUSCULAR; INTRAVENOUS at 21:35

## 2018-01-01 RX ADMIN — DEXTROSE MONOHYDRATE: 50 INJECTION, SOLUTION INTRAVENOUS at 10:43

## 2018-01-01 RX ADMIN — APIXABAN 5 MG: 5 TABLET, FILM COATED ORAL at 22:54

## 2018-01-01 RX ADMIN — FUROSEMIDE 20 MG: 10 INJECTION, SOLUTION INTRAMUSCULAR; INTRAVENOUS at 17:40

## 2018-01-01 RX ADMIN — APIXABAN 5 MG: 5 TABLET, FILM COATED ORAL at 12:41

## 2018-01-01 RX ADMIN — Medication 2 MG: at 05:37

## 2018-01-01 RX ADMIN — Medication 10 ML: at 20:40

## 2018-01-01 RX ADMIN — SODIUM CHLORIDE: 9 INJECTION, SOLUTION INTRAVENOUS at 13:11

## 2018-01-01 RX ADMIN — MORPHINE SULFATE 4 MG: 4 INJECTION INTRAVENOUS at 04:29

## 2018-01-01 RX ADMIN — ACETAMINOPHEN 650 MG: 325 TABLET ORAL at 12:39

## 2018-01-01 RX ADMIN — Medication 10 ML: at 00:54

## 2018-01-01 RX ADMIN — LEVETIRACETAM 567 MG: 500 SOLUTION ORAL at 20:35

## 2018-01-01 RX ADMIN — Medication 10 ML: at 09:36

## 2018-01-01 RX ADMIN — Medication 2 MG: at 16:39

## 2018-01-01 RX ADMIN — FAMOTIDINE 20 MG: 10 INJECTION, SOLUTION INTRAVENOUS at 21:35

## 2018-01-01 RX ADMIN — FAMOTIDINE 20 MG: 10 INJECTION, SOLUTION INTRAVENOUS at 20:39

## 2018-01-01 RX ADMIN — Medication 10 ML: at 20:39

## 2018-01-01 RX ADMIN — LEVETIRACETAM 500 MG: 500 SOLUTION ORAL at 09:04

## 2018-01-01 RX ADMIN — HEPARIN SODIUM AND DEXTROSE 12 UNITS/KG/HR: 10000; 5 INJECTION INTRAVENOUS at 19:47

## 2018-01-01 RX ADMIN — LORAZEPAM 1 MG: 1 TABLET ORAL at 01:38

## 2018-01-01 RX ADMIN — LEVETIRACETAM 567 MG: 500 INJECTION, SOLUTION, CONCENTRATE INTRAVENOUS at 04:35

## 2018-01-01 RX ADMIN — FAMOTIDINE 20 MG: 10 INJECTION, SOLUTION INTRAVENOUS at 22:54

## 2018-01-01 RX ADMIN — FAMOTIDINE 20 MG: 10 INJECTION, SOLUTION INTRAVENOUS at 09:04

## 2018-01-01 RX ADMIN — BUMETANIDE 2 MG: 0.25 INJECTION INTRAMUSCULAR; INTRAVENOUS at 16:44

## 2018-01-01 RX ADMIN — DIPHENHYDRAMINE HYDROCHLORIDE 25 MG: 25 SOLUTION ORAL at 22:39

## 2018-01-01 RX ADMIN — Medication 2 MG: at 09:33

## 2018-01-01 RX ADMIN — FUROSEMIDE 20 MG: 10 INJECTION, SOLUTION INTRAMUSCULAR; INTRAVENOUS at 10:24

## 2018-01-01 RX ADMIN — ASPIRIN 81 MG: 81 TABLET, CHEWABLE ORAL at 09:59

## 2018-01-01 RX ADMIN — LISINOPRIL 5 MG: 10 TABLET ORAL at 10:13

## 2018-01-01 RX ADMIN — APIXABAN 5 MG: 5 TABLET, FILM COATED ORAL at 10:56

## 2018-01-01 RX ADMIN — BISOPROLOL FUMARATE 2.5 MG: 5 TABLET ORAL at 10:40

## 2018-01-01 RX ADMIN — LEVETIRACETAM 567 MG: 500 SOLUTION ORAL at 09:48

## 2018-01-01 RX ADMIN — FAMOTIDINE 20 MG: 10 INJECTION, SOLUTION INTRAVENOUS at 20:35

## 2018-01-01 RX ADMIN — FAMOTIDINE 20 MG: 10 INJECTION, SOLUTION INTRAVENOUS at 09:43

## 2018-01-01 RX ADMIN — Medication 10 ML: at 08:51

## 2018-01-01 RX ADMIN — MORPHINE SULFATE 2 MG: 2 INJECTION, SOLUTION INTRAMUSCULAR; INTRAVENOUS at 13:11

## 2018-01-01 RX ADMIN — HEPARIN SODIUM AND DEXTROSE 18 UNITS/KG/HR: 10000; 5 INJECTION INTRAVENOUS at 18:26

## 2018-01-01 RX ADMIN — FAMOTIDINE 20 MG: 10 INJECTION, SOLUTION INTRAVENOUS at 12:52

## 2018-01-01 RX ADMIN — Medication 10 ML: at 10:46

## 2018-01-01 RX ADMIN — ENOXAPARIN SODIUM 60 MG: 60 INJECTION SUBCUTANEOUS at 21:59

## 2018-01-01 RX ADMIN — BISOPROLOL FUMARATE 2.5 MG: 5 TABLET ORAL at 09:39

## 2018-01-01 RX ADMIN — LEVETIRACETAM 567 MG: 500 SOLUTION ORAL at 14:53

## 2018-01-01 RX ADMIN — MORPHINE SULFATE 1 MG: 2 INJECTION, SOLUTION INTRAMUSCULAR; INTRAVENOUS at 22:41

## 2018-01-01 RX ADMIN — LORAZEPAM 0.5 MG: 2 INJECTION, SOLUTION INTRAMUSCULAR; INTRAVENOUS at 11:00

## 2018-01-01 RX ADMIN — Medication 2 MG: at 21:54

## 2018-01-01 RX ADMIN — Medication 2 MG: at 21:18

## 2018-01-01 RX ADMIN — FAMOTIDINE 20 MG: 10 INJECTION, SOLUTION INTRAVENOUS at 08:51

## 2018-01-01 RX ADMIN — Medication 10 ML: at 09:33

## 2018-01-01 RX ADMIN — LORAZEPAM 0.5 MG: 2 INJECTION INTRAMUSCULAR; INTRAVENOUS at 15:20

## 2018-01-01 RX ADMIN — FUROSEMIDE 20 MG: 10 INJECTION, SOLUTION INTRAMUSCULAR; INTRAVENOUS at 09:04

## 2018-01-01 RX ADMIN — LEVETIRACETAM 567 MG: 500 INJECTION, SOLUTION, CONCENTRATE INTRAVENOUS at 06:20

## 2018-01-01 RX ADMIN — DIPHENHYDRAMINE HYDROCHLORIDE 25 MG: 25 SOLUTION ORAL at 23:22

## 2018-01-01 RX ADMIN — Medication 10 ML: at 10:37

## 2018-01-01 RX ADMIN — Medication 10 ML: at 21:20

## 2018-01-01 RX ADMIN — MORPHINE SULFATE 2 MG: 2 INJECTION, SOLUTION INTRAMUSCULAR; INTRAVENOUS at 22:06

## 2018-01-01 RX ADMIN — CARVEDILOL 3.12 MG: 3.12 TABLET, FILM COATED ORAL at 17:58

## 2018-01-01 RX ADMIN — CARVEDILOL 3.12 MG: 3.12 TABLET, FILM COATED ORAL at 09:04

## 2018-01-01 RX ADMIN — LEVETIRACETAM 500 MG: 500 SOLUTION ORAL at 10:45

## 2018-01-01 RX ADMIN — HEPARIN SODIUM AND DEXTROSE 18 UNITS/KG/HR: 10000; 5 INJECTION INTRAVENOUS at 11:46

## 2018-01-01 RX ADMIN — MORPHINE SULFATE 2 MG: 2 INJECTION, SOLUTION INTRAMUSCULAR; INTRAVENOUS at 15:53

## 2018-01-01 RX ADMIN — DEXTROSE AND SODIUM CHLORIDE: 5; 450 INJECTION, SOLUTION INTRAVENOUS at 18:38

## 2018-01-01 RX ADMIN — LORAZEPAM 0.5 MG: 2 INJECTION INTRAMUSCULAR; INTRAVENOUS at 10:03

## 2018-01-01 RX ADMIN — LORAZEPAM 0.5 MG: 2 INJECTION INTRAMUSCULAR; INTRAVENOUS at 23:58

## 2018-01-01 RX ADMIN — CARVEDILOL 3.12 MG: 3.12 TABLET, FILM COATED ORAL at 08:50

## 2018-01-01 RX ADMIN — LISINOPRIL 5 MG: 10 TABLET ORAL at 10:55

## 2018-01-01 RX ADMIN — LISINOPRIL 10 MG: 10 TABLET ORAL at 00:14

## 2018-01-01 RX ADMIN — WARFARIN SODIUM 10 MG: 10 TABLET ORAL at 17:03

## 2018-01-01 RX ADMIN — ACETAMINOPHEN 650 MG: 650 SOLUTION ORAL at 02:17

## 2018-01-01 RX ADMIN — LEVETIRACETAM 500 MG: 500 SOLUTION ORAL at 21:54

## 2018-01-01 RX ADMIN — ASPIRIN 81 MG: 81 TABLET, CHEWABLE ORAL at 09:04

## 2018-01-01 RX ADMIN — Medication 2 MG: at 09:08

## 2018-01-01 RX ADMIN — MORPHINE SULFATE 4 MG: 4 INJECTION INTRAVENOUS at 02:10

## 2018-01-01 RX ADMIN — DIPHENHYDRAMINE HYDROCHLORIDE 25 MG: 25 SOLUTION ORAL at 18:41

## 2018-01-01 RX ADMIN — LEVETIRACETAM 567 MG: 500 SOLUTION ORAL at 09:37

## 2018-01-01 RX ADMIN — APIXABAN 5 MG: 5 TABLET, FILM COATED ORAL at 22:12

## 2018-01-01 RX ADMIN — LEVOFLOXACIN 500 MG: 5 INJECTION, SOLUTION INTRAVENOUS at 04:54

## 2018-01-01 RX ADMIN — MORPHINE SULFATE 1 MG: 2 INJECTION, SOLUTION INTRAMUSCULAR; INTRAVENOUS at 10:59

## 2018-01-01 RX ADMIN — Medication 2 MG: at 15:30

## 2018-01-01 RX ADMIN — HEPARIN SODIUM AND DEXTROSE 28.05 UNITS/KG/HR: 10000; 5 INJECTION INTRAVENOUS at 00:29

## 2018-01-01 RX ADMIN — APIXABAN 5 MG: 5 TABLET, FILM COATED ORAL at 09:32

## 2018-01-01 RX ADMIN — LEVETIRACETAM 567 MG: 500 SOLUTION ORAL at 10:56

## 2018-01-01 RX ADMIN — Medication 10 ML: at 20:36

## 2018-01-01 RX ADMIN — MORPHINE SULFATE 4 MG: 4 INJECTION INTRAVENOUS at 12:50

## 2018-01-01 RX ADMIN — FAMOTIDINE 20 MG: 10 INJECTION, SOLUTION INTRAVENOUS at 21:43

## 2018-01-01 RX ADMIN — Medication 10 ML: at 21:54

## 2018-01-01 RX ADMIN — HEPARIN SODIUM AND DEXTROSE 16 UNITS/KG/HR: 10000; 5 INJECTION INTRAVENOUS at 23:38

## 2018-01-01 RX ADMIN — FAMOTIDINE 20 MG: 10 INJECTION, SOLUTION INTRAVENOUS at 21:20

## 2018-01-01 RX ADMIN — LEVETIRACETAM 567 MG: 500 INJECTION, SOLUTION, CONCENTRATE INTRAVENOUS at 18:32

## 2018-01-01 RX ADMIN — HEPARIN SODIUM 5000 UNITS: 1000 INJECTION, SOLUTION INTRAVENOUS; SUBCUTANEOUS at 12:22

## 2018-01-01 RX ADMIN — MORPHINE SULFATE 4 MG: 4 INJECTION INTRAVENOUS at 05:28

## 2018-01-01 RX ADMIN — FAMOTIDINE 20 MG: 10 INJECTION, SOLUTION INTRAVENOUS at 10:35

## 2018-01-01 RX ADMIN — FAMOTIDINE 20 MG: 10 INJECTION, SOLUTION INTRAVENOUS at 21:00

## 2018-01-01 RX ADMIN — HEPARIN SODIUM AND DEXTROSE 28.04 UNITS/KG/HR: 10000; 5 INJECTION INTRAVENOUS at 21:27

## 2018-01-01 RX ADMIN — FAMOTIDINE 20 MG: 10 INJECTION, SOLUTION INTRAVENOUS at 11:32

## 2018-01-01 RX ADMIN — MORPHINE SULFATE 4 MG: 4 INJECTION INTRAVENOUS at 20:38

## 2018-01-01 RX ADMIN — ACETAMINOPHEN 650 MG: 650 SOLUTION ORAL at 21:18

## 2018-01-01 RX ADMIN — HYDROCODONE BITARTRATE AND ACETAMINOPHEN 2 TABLET: 5; 325 TABLET ORAL at 22:20

## 2018-01-01 RX ADMIN — Medication 10 ML: at 10:41

## 2018-01-01 RX ADMIN — WARFARIN SODIUM 5 MG: 5 TABLET ORAL at 17:58

## 2018-01-01 RX ADMIN — Medication 10 ML: at 09:41

## 2018-01-01 RX ADMIN — Medication 2 MG: at 18:43

## 2018-01-01 RX ADMIN — LISINOPRIL 5 MG: 10 TABLET ORAL at 09:39

## 2018-01-01 RX ADMIN — ACETAMINOPHEN 650 MG: 650 SOLUTION ORAL at 01:59

## 2018-01-01 RX ADMIN — LISINOPRIL 5 MG: 5 TABLET ORAL at 09:04

## 2018-01-01 RX ADMIN — MORPHINE SULFATE 4 MG: 4 INJECTION INTRAVENOUS at 04:05

## 2018-01-01 RX ADMIN — LORAZEPAM 0.5 MG: 2 INJECTION INTRAMUSCULAR; INTRAVENOUS at 00:23

## 2018-01-01 RX ADMIN — HYDROMORPHONE HYDROCHLORIDE 1 MG: 2 TABLET ORAL at 04:16

## 2018-01-01 RX ADMIN — PROPOFOL 125 MCG/KG/MIN: 10 INJECTION, EMULSION INTRAVENOUS at 11:35

## 2018-01-01 RX ADMIN — ASPIRIN 81 MG: 81 TABLET, CHEWABLE ORAL at 09:08

## 2018-01-01 RX ADMIN — HEPARIN SODIUM 4540 UNITS: 1000 INJECTION, SOLUTION INTRAVENOUS; SUBCUTANEOUS at 18:26

## 2018-01-01 RX ADMIN — LEVETIRACETAM 567 MG: 500 SOLUTION ORAL at 11:33

## 2018-01-01 RX ADMIN — PROMETHAZINE HYDROCHLORIDE 12.5 MG: 25 INJECTION INTRAMUSCULAR; INTRAVENOUS at 15:41

## 2018-01-01 RX ADMIN — APIXABAN 5 MG: 5 TABLET, FILM COATED ORAL at 11:32

## 2018-01-01 RX ADMIN — HEPARIN SODIUM 3590 UNITS: 1000 INJECTION, SOLUTION INTRAVENOUS; SUBCUTANEOUS at 21:36

## 2018-01-01 RX ADMIN — MORPHINE SULFATE 4 MG: 4 INJECTION INTRAVENOUS at 05:00

## 2018-01-01 RX ADMIN — MORPHINE SULFATE 4 MG: 4 INJECTION INTRAVENOUS at 05:30

## 2018-01-01 RX ADMIN — LEVETIRACETAM 500 MG: 500 SOLUTION ORAL at 09:08

## 2018-01-01 RX ADMIN — BISACODYL 10 MG: 10 SUPPOSITORY RECTAL at 12:06

## 2018-01-01 RX ADMIN — HEPARIN SODIUM 2270 UNITS: 1000 INJECTION, SOLUTION INTRAVENOUS; SUBCUTANEOUS at 09:17

## 2018-01-01 RX ADMIN — FUROSEMIDE 20 MG: 10 INJECTION INTRAMUSCULAR; INTRAVENOUS at 10:45

## 2018-01-01 RX ADMIN — SODIUM CHLORIDE, POTASSIUM CHLORIDE, SODIUM LACTATE AND CALCIUM CHLORIDE: 600; 310; 30; 20 INJECTION, SOLUTION INTRAVENOUS at 11:28

## 2018-01-01 RX ADMIN — BISOPROLOL FUMARATE 2.5 MG: 5 TABLET ORAL at 10:37

## 2018-01-01 RX ADMIN — MORPHINE SULFATE 4 MG: 4 INJECTION INTRAVENOUS at 04:27

## 2018-01-01 RX ADMIN — MORPHINE SULFATE 2 MG: 2 INJECTION, SOLUTION INTRAMUSCULAR; INTRAVENOUS at 15:11

## 2018-01-01 RX ADMIN — LEVETIRACETAM 567 MG: 500 INJECTION, SOLUTION, CONCENTRATE INTRAVENOUS at 17:57

## 2018-01-01 RX ADMIN — HEPARIN SODIUM AND DEXTROSE 18 UNITS/KG/HR: 10000; 5 INJECTION INTRAVENOUS at 13:44

## 2018-01-01 RX ADMIN — Medication 10 ML: at 22:13

## 2018-01-01 RX ADMIN — LEVETIRACETAM 500 MG: 500 SOLUTION ORAL at 21:39

## 2018-01-01 RX ADMIN — HEPARIN SODIUM AND DEXTROSE 16.03 UNITS/KG/HR: 10000; 5 INJECTION INTRAVENOUS at 02:02

## 2018-01-01 RX ADMIN — LEVETIRACETAM 567 MG: 500 SOLUTION ORAL at 08:50

## 2018-01-01 RX ADMIN — MORPHINE SULFATE 2 MG: 2 INJECTION, SOLUTION INTRAMUSCULAR; INTRAVENOUS at 14:39

## 2018-01-01 RX ADMIN — DIPHENHYDRAMINE HYDROCHLORIDE 25 MG: 25 SOLUTION ORAL at 21:18

## 2018-01-01 RX ADMIN — APIXABAN 5 MG: 5 TABLET, FILM COATED ORAL at 22:06

## 2018-01-01 RX ADMIN — DIPHENHYDRAMINE HYDROCHLORIDE 25 MG: 25 SOLUTION ORAL at 23:38

## 2018-01-01 RX ADMIN — LISINOPRIL 5 MG: 10 TABLET ORAL at 09:31

## 2018-01-01 RX ADMIN — FAMOTIDINE 20 MG: 10 INJECTION, SOLUTION INTRAVENOUS at 10:40

## 2018-01-01 RX ADMIN — LORAZEPAM 0.5 MG: 2 INJECTION INTRAMUSCULAR; INTRAVENOUS at 22:49

## 2018-01-01 RX ADMIN — BISOPROLOL FUMARATE 2.5 MG: 5 TABLET ORAL at 11:34

## 2018-01-01 RX ADMIN — LISINOPRIL 5 MG: 5 TABLET ORAL at 09:59

## 2018-01-01 RX ADMIN — APIXABAN 5 MG: 5 TABLET, FILM COATED ORAL at 10:41

## 2018-01-01 RX ADMIN — LORAZEPAM 0.5 MG: 2 INJECTION INTRAMUSCULAR; INTRAVENOUS at 00:05

## 2018-01-01 RX ADMIN — MORPHINE SULFATE 4 MG: 4 INJECTION INTRAVENOUS at 02:00

## 2018-01-01 RX ADMIN — APIXABAN 5 MG: 5 TABLET, FILM COATED ORAL at 10:36

## 2018-01-01 RX ADMIN — CARVEDILOL 3.12 MG: 3.12 TABLET, FILM COATED ORAL at 17:15

## 2018-01-01 RX ADMIN — ASPIRIN 81 MG: 81 TABLET, CHEWABLE ORAL at 09:32

## 2018-01-01 RX ADMIN — APIXABAN 5 MG: 5 TABLET, FILM COATED ORAL at 09:37

## 2018-01-01 RX ADMIN — LISINOPRIL 5 MG: 10 TABLET ORAL at 10:40

## 2018-01-01 RX ADMIN — LORAZEPAM 0.5 MG: 2 INJECTION INTRAMUSCULAR; INTRAVENOUS at 00:58

## 2018-01-01 RX ADMIN — LEVETIRACETAM 567 MG: 500 INJECTION, SOLUTION, CONCENTRATE INTRAVENOUS at 19:04

## 2018-01-01 RX ADMIN — ALUMINUM HYDROXIDE, MAGNESIUM HYDROXIDE, AND SIMETHICONE 30 ML: 200; 200; 20 SUSPENSION ORAL at 17:04

## 2018-01-01 RX ADMIN — MORPHINE SULFATE 2 MG: 2 INJECTION, SOLUTION INTRAMUSCULAR; INTRAVENOUS at 16:21

## 2018-01-01 RX ADMIN — LEVETIRACETAM 567 MG: 500 INJECTION, SOLUTION, CONCENTRATE INTRAVENOUS at 05:45

## 2018-01-01 RX ADMIN — ACETAMINOPHEN 650 MG: 650 SOLUTION ORAL at 18:23

## 2018-01-01 RX ADMIN — ACETAMINOPHEN 650 MG: 650 SOLUTION ORAL at 22:06

## 2018-01-01 RX ADMIN — Medication 10 ML: at 09:59

## 2018-01-01 RX ADMIN — HEPARIN SODIUM 5000 UNITS: 5000 INJECTION, SOLUTION INTRAVENOUS; SUBCUTANEOUS at 19:48

## 2018-01-01 RX ADMIN — DEXTROSE MONOHYDRATE: 50 INJECTION, SOLUTION INTRAVENOUS at 15:49

## 2018-01-01 RX ADMIN — DEXTROSE MONOHYDRATE: 50 INJECTION, SOLUTION INTRAVENOUS at 05:10

## 2018-01-01 RX ADMIN — Medication 2 MG: at 22:28

## 2018-01-01 RX ADMIN — Medication 2 MG: at 17:15

## 2018-01-01 RX ADMIN — LORAZEPAM 1 MG: 1 TABLET ORAL at 14:53

## 2018-01-01 RX ADMIN — CARVEDILOL 3.12 MG: 3.12 TABLET, FILM COATED ORAL at 10:45

## 2018-01-01 RX ADMIN — Medication 10 ML: at 00:24

## 2018-01-01 RX ADMIN — Medication 2 MG: at 09:59

## 2018-01-01 RX ADMIN — MORPHINE SULFATE 2 MG: 2 INJECTION, SOLUTION INTRAMUSCULAR; INTRAVENOUS at 10:03

## 2018-01-01 RX ADMIN — LORAZEPAM 0.5 MG: 2 INJECTION INTRAMUSCULAR; INTRAVENOUS at 15:51

## 2018-01-01 RX ADMIN — HEPARIN SODIUM 4540 UNITS: 1000 INJECTION, SOLUTION INTRAVENOUS; SUBCUTANEOUS at 11:45

## 2018-01-01 RX ADMIN — FENTANYL CITRATE 25 MCG: 50 INJECTION, SOLUTION INTRAMUSCULAR; INTRAVENOUS at 13:01

## 2018-01-01 RX ADMIN — APIXABAN 5 MG: 5 TABLET, FILM COATED ORAL at 21:35

## 2018-01-01 RX ADMIN — PROMETHAZINE HYDROCHLORIDE 12.5 MG: 25 INJECTION INTRAMUSCULAR; INTRAVENOUS at 05:55

## 2018-01-01 RX ADMIN — IBUPROFEN 400 MG: 100 SUSPENSION ORAL at 21:15

## 2018-01-01 RX ADMIN — LEVETIRACETAM 567 MG: 500 SOLUTION ORAL at 22:12

## 2018-01-01 RX ADMIN — PHENYLEPHRINE HYDROCHLORIDE 100 MCG: 10 INJECTION INTRAVENOUS at 12:27

## 2018-01-01 RX ADMIN — APIXABAN 5 MG: 5 TABLET, FILM COATED ORAL at 21:30

## 2018-01-01 RX ADMIN — LORAZEPAM 0.5 MG: 2 INJECTION INTRAMUSCULAR; INTRAVENOUS at 05:43

## 2018-01-01 RX ADMIN — Medication 10 ML: at 08:02

## 2018-01-01 RX ADMIN — HEPARIN SODIUM 3590 UNITS: 1000 INJECTION, SOLUTION INTRAVENOUS; SUBCUTANEOUS at 14:15

## 2018-01-01 RX ADMIN — LEVETIRACETAM 567 MG: 500 SOLUTION ORAL at 22:06

## 2018-01-01 RX ADMIN — BISOPROLOL FUMARATE 2.5 MG: 5 TABLET ORAL at 10:57

## 2018-01-01 RX ADMIN — LEVETIRACETAM 567 MG: 500 SOLUTION ORAL at 21:34

## 2018-01-01 RX ADMIN — HEPARIN SODIUM 1800 UNITS: 1000 INJECTION, SOLUTION INTRAVENOUS; SUBCUTANEOUS at 06:51

## 2018-01-01 RX ADMIN — ACETAMINOPHEN 650 MG: 650 SOLUTION ORAL at 20:13

## 2018-01-01 RX ADMIN — CARVEDILOL 3.12 MG: 3.12 TABLET, FILM COATED ORAL at 09:32

## 2018-01-01 RX ADMIN — FENTANYL CITRATE 50 MCG: 50 INJECTION, SOLUTION INTRAMUSCULAR; INTRAVENOUS at 11:32

## 2018-01-01 RX ADMIN — APIXABAN 5 MG: 5 TABLET, FILM COATED ORAL at 21:01

## 2018-01-01 RX ADMIN — LEVETIRACETAM 567 MG: 500 SOLUTION ORAL at 22:05

## 2018-01-01 RX ADMIN — Medication 10 ML: at 09:14

## 2018-01-01 RX ADMIN — LEVETIRACETAM 567 MG: 500 SOLUTION ORAL at 10:40

## 2018-01-01 RX ADMIN — APIXABAN 5 MG: 5 TABLET, FILM COATED ORAL at 22:37

## 2018-01-01 RX ADMIN — WARFARIN SODIUM 5 MG: 5 TABLET ORAL at 18:43

## 2018-01-01 RX ADMIN — ONDANSETRON 4 MG: 2 INJECTION INTRAMUSCULAR; INTRAVENOUS at 01:28

## 2018-01-01 RX ADMIN — LEVETIRACETAM 500 MG: 500 SOLUTION ORAL at 22:20

## 2018-01-01 RX ADMIN — Medication 2 MG: at 09:04

## 2018-01-01 RX ADMIN — LEVETIRACETAM 567 MG: 500 SOLUTION ORAL at 09:34

## 2018-01-01 RX ADMIN — Medication 10 ML: at 11:03

## 2018-01-01 RX ADMIN — LEVETIRACETAM 567 MG: 500 SOLUTION ORAL at 20:59

## 2018-01-01 RX ADMIN — FAMOTIDINE 20 MG: 10 INJECTION, SOLUTION INTRAVENOUS at 21:30

## 2018-01-01 RX ADMIN — FUROSEMIDE 20 MG: 10 INJECTION, SOLUTION INTRAMUSCULAR; INTRAVENOUS at 17:58

## 2018-01-01 RX ADMIN — ASPIRIN 81 MG: 81 TABLET, CHEWABLE ORAL at 08:50

## 2018-01-01 RX ADMIN — Medication 10 ML: at 21:43

## 2018-01-01 RX ADMIN — HEPARIN SODIUM AND DEXTROSE 20.03 UNITS/KG/HR: 10000; 5 INJECTION INTRAVENOUS at 07:42

## 2018-01-01 RX ADMIN — FAMOTIDINE 20 MG: 10 INJECTION, SOLUTION INTRAVENOUS at 09:32

## 2018-01-01 RX ADMIN — Medication 10 ML: at 21:40

## 2018-01-01 RX ADMIN — LEVETIRACETAM 567 MG: 500 SOLUTION ORAL at 09:03

## 2018-01-01 RX ADMIN — FAMOTIDINE 20 MG: 10 INJECTION, SOLUTION INTRAVENOUS at 22:05

## 2018-01-01 RX ADMIN — MORPHINE SULFATE 2 MG: 2 INJECTION, SOLUTION INTRAMUSCULAR; INTRAVENOUS at 01:35

## 2018-01-01 RX ADMIN — Medication 10 ML: at 21:29

## 2018-01-01 RX ADMIN — Medication 10 ML: at 22:12

## 2018-01-01 RX ADMIN — Medication 10 ML: at 22:57

## 2018-01-01 RX ADMIN — PHENYLEPHRINE HYDROCHLORIDE 50 MCG: 10 INJECTION INTRAVENOUS at 11:53

## 2018-01-01 ASSESSMENT — PATIENT HEALTH QUESTIONNAIRE - PHQ9
2. FEELING DOWN, DEPRESSED OR HOPELESS: 0
SUM OF ALL RESPONSES TO PHQ9 QUESTIONS 1 & 2: 0
1. LITTLE INTEREST OR PLEASURE IN DOING THINGS: 0
SUM OF ALL RESPONSES TO PHQ QUESTIONS 1-9: 0

## 2018-01-01 ASSESSMENT — PULMONARY FUNCTION TESTS
PIF_VALUE: 0
PIF_VALUE: 1
PIF_VALUE: 0
PIF_VALUE: 1
PIF_VALUE: 0
PIF_VALUE: 0
PIF_VALUE: 1
PIF_VALUE: 0
PIF_VALUE: 1
PIF_VALUE: 0
PIF_VALUE: 1
PIF_VALUE: 1
PIF_VALUE: 22
PIF_VALUE: 1
PIF_VALUE: 1
PIF_VALUE: 18
PIF_VALUE: 0
PIF_VALUE: 0
PIF_VALUE: 1
PIF_VALUE: 0
PIF_VALUE: 19
PIF_VALUE: 1
PIF_VALUE: 1
PIF_VALUE: 0
PIF_VALUE: 1
PIF_VALUE: 1
PIF_VALUE: 0
PIF_VALUE: 0
PIF_VALUE: 1
PIF_VALUE: 18
PIF_VALUE: 1
PIF_VALUE: 0
PIF_VALUE: 1
PIF_VALUE: 0
PIF_VALUE: 0
PIF_VALUE: 19
PIF_VALUE: 1
PIF_VALUE: 1
PIF_VALUE: 0
PIF_VALUE: 1
PIF_VALUE: 1
PIF_VALUE: 0
PIF_VALUE: 1
PIF_VALUE: 0
PIF_VALUE: 1
PIF_VALUE: 0
PIF_VALUE: 20
PIF_VALUE: 1
PIF_VALUE: 0
PIF_VALUE: 18
PIF_VALUE: 1
PIF_VALUE: 0
PIF_VALUE: 1
PIF_VALUE: 19
PIF_VALUE: 0
PIF_VALUE: 1
PIF_VALUE: 0
PIF_VALUE: 1
PIF_VALUE: 0
PIF_VALUE: 1
PIF_VALUE: 0
PIF_VALUE: 1
PIF_VALUE: 0
PIF_VALUE: 1
PIF_VALUE: 0
PIF_VALUE: 1

## 2018-01-01 ASSESSMENT — ENCOUNTER SYMPTOMS
WHEEZING: 0
ABDOMINAL PAIN: 0
BLOOD IN STOOL: 0
WHEEZING: 0
WHEEZING: 0
BLURRED VISION: 0
HEMOPTYSIS: 0
VOMITING: 0
CONSTIPATION: 0
WHEEZING: 0
CHEST TIGHTNESS: 0
COUGH: 0
DIARRHEA: 0
VOMITING: 0
COLOR CHANGE: 0
CHEST TIGHTNESS: 0
WHEEZING: 0
ALLERGIC/IMMUNOLOGIC NEGATIVE: 1
COUGH: 1
ABDOMINAL PAIN: 0
COUGH: 1
SORE THROAT: 0
COUGH: 1
SHORTNESS OF BREATH: 0
COUGH: 1
BACK PAIN: 0
VOMITING: 0
SORE THROAT: 0
SHORTNESS OF BREATH: 0
ABDOMINAL PAIN: 0
HEMOPTYSIS: 0
BLURRED VISION: 0
COUGH: 1
BLURRED VISION: 0
BACK PAIN: 0
ABDOMINAL PAIN: 0
BLURRED VISION: 0
CONSTIPATION: 0
COUGH: 0
BLURRED VISION: 0
CONSTIPATION: 0
VOMITING: 0
NAUSEA: 0
NAUSEA: 0
CONSTIPATION: 0
VOMITING: 0
ABDOMINAL PAIN: 0
DIARRHEA: 0
EYE PAIN: 0
ABDOMINAL PAIN: 0
WHEEZING: 0
SHORTNESS OF BREATH: 0
ABDOMINAL PAIN: 0
SHORTNESS OF BREATH: 0
BLURRED VISION: 0
SPUTUM PRODUCTION: 1
VOMITING: 0
SHORTNESS OF BREATH: 0
RHINORRHEA: 0
CONSTIPATION: 0
COUGH: 1
VOMITING: 0
NAUSEA: 0
CONSTIPATION: 0
SPUTUM PRODUCTION: 1
NAUSEA: 0
DIARRHEA: 0
ABDOMINAL PAIN: 0
ABDOMINAL PAIN: 0
NAUSEA: 0
COUGH: 0
DIARRHEA: 0
NAUSEA: 0
SHORTNESS OF BREATH: 1
DIARRHEA: 0
VOMITING: 0
VOMITING: 0
CONSTIPATION: 0
COLOR CHANGE: 0
SHORTNESS OF BREATH: 0
DIARRHEA: 0
NAUSEA: 0
BLURRED VISION: 0
SHORTNESS OF BREATH: 0
SPUTUM PRODUCTION: 1
CONSTIPATION: 0
SPUTUM PRODUCTION: 1
SHORTNESS OF BREATH: 0
COUGH: 1
DIARRHEA: 0
EYE DISCHARGE: 0
SHORTNESS OF BREATH: 0
NAUSEA: 0
WHEEZING: 0
NAUSEA: 0
DIARRHEA: 0
DIARRHEA: 0
SHORTNESS OF BREATH: 0
SPUTUM PRODUCTION: 0

## 2018-01-01 ASSESSMENT — PAIN SCALES - GENERAL
PAINLEVEL_OUTOF10: 10
PAINLEVEL_OUTOF10: 10
PAINLEVEL_OUTOF10: 0
PAINLEVEL_OUTOF10: 7
PAINLEVEL_OUTOF10: 3
PAINLEVEL_OUTOF10: 10
PAINLEVEL_OUTOF10: 0
PAINLEVEL_OUTOF10: 10
PAINLEVEL_OUTOF10: 10
PAINLEVEL_OUTOF10: 8
PAINLEVEL_OUTOF10: 10
PAINLEVEL_OUTOF10: 10
PAINLEVEL_OUTOF10: 6
PAINLEVEL_OUTOF10: 7
PAINLEVEL_OUTOF10: 10
PAINLEVEL_OUTOF10: 7
PAINLEVEL_OUTOF10: 8
PAINLEVEL_OUTOF10: 10
PAINLEVEL_OUTOF10: 10
PAINLEVEL_OUTOF10: 0
PAINLEVEL_OUTOF10: 10
PAINLEVEL_OUTOF10: 5
PAINLEVEL_OUTOF10: 10
PAINLEVEL_OUTOF10: 0
PAINLEVEL_OUTOF10: 10
PAINLEVEL_OUTOF10: 5
PAINLEVEL_OUTOF10: 6
PAINLEVEL_OUTOF10: 8
PAINLEVEL_OUTOF10: 10
PAINLEVEL_OUTOF10: 6
PAINLEVEL_OUTOF10: 6
PAINLEVEL_OUTOF10: 7
PAINLEVEL_OUTOF10: 10
PAINLEVEL_OUTOF10: 5
PAINLEVEL_OUTOF10: 3
PAINLEVEL_OUTOF10: 8
PAINLEVEL_OUTOF10: 8
PAINLEVEL_OUTOF10: 10

## 2018-01-01 ASSESSMENT — PAIN SCALES - WONG BAKER
WONGBAKER_NUMERICALRESPONSE: 0
WONGBAKER_NUMERICALRESPONSE: 0
WONGBAKER_NUMERICALRESPONSE: 2
WONGBAKER_NUMERICALRESPONSE: 0
WONGBAKER_NUMERICALRESPONSE: 2
WONGBAKER_NUMERICALRESPONSE: 2
WONGBAKER_NUMERICALRESPONSE: 0
WONGBAKER_NUMERICALRESPONSE: 0

## 2018-01-01 ASSESSMENT — PAIN DESCRIPTION - LOCATION: LOCATION: HEAD

## 2018-01-01 ASSESSMENT — PAIN DESCRIPTION - PAIN TYPE: TYPE: SURGICAL PAIN

## 2018-01-22 NOTE — PROGRESS NOTES
Visit Information    Have you changed or started any medications since your last visit including any over-the-counter medicines, vitamins, or herbal medicines? no   Have you stopped taking any of your medications? Is so, why? -  no  Are you having any side effects from any of your medications? - no    Have you seen any other physician or provider since your last visit? yes - neurologist   Have you had any other diagnostic tests since your last visit?  no   Have you been seen in the emergency room and/or had an admission in a hospital since we last saw you?  no   Have you had your routine dental cleaning in the past 6 months?  no     Do you have an active MyChart account? If no, what is the barrier?   No:     No care team member to display    Medical History Review  Past Medical, Family, and Social History reviewed and does not contribute to the patient presenting condition    Health Maintenance   Topic Date Due    HIV screen  07/21/2006    DTaP/Tdap/Td vaccine (1 - Tdap) 07/21/2010    Flu vaccine (1) 09/01/2017

## 2018-05-24 PROBLEM — F79 MENTAL RETARDATION: Status: ACTIVE | Noted: 2018-01-01

## 2018-05-24 PROBLEM — R05.8 POST-VIRAL COUGH SYNDROME: Status: ACTIVE | Noted: 2018-01-01

## 2018-05-24 PROBLEM — G40.909 SEIZURE DISORDER (HCC): Status: ACTIVE | Noted: 2018-01-01

## 2018-06-06 NOTE — TELEPHONE ENCOUNTER
Will readdress on his next appointment. Please inform patient's mother.  Thanks and have a nice day. - Dr. Anamaria Drew

## 2018-07-09 PROBLEM — I50.21 ACUTE SYSTOLIC CONGESTIVE HEART FAILURE (HCC): Status: ACTIVE | Noted: 2018-01-01

## 2018-07-09 PROBLEM — R05.8 POST-VIRAL COUGH SYNDROME: Status: RESOLVED | Noted: 2018-01-01 | Resolved: 2018-01-01

## 2018-07-09 NOTE — PROGRESS NOTES
Visit Information    Have you changed or started any medications since your last visit including any over-the-counter medicines, vitamins, or herbal medicines? no  Have you stopped taking any of your medications? Is so, why? -  no  Are you having any side effects from any of your medications? - no    Have you seen any other physician or provider since your last visit?  no   Have you had any other diagnostic tests since your last visit? yes - xray and labs   Have you been seen in the emergency room and/or had an admission in a hospital since we last saw you?  yes - [facility]   Have you had your routine dental cleaning in the past 6 months?  no     Do you have an active MyChart account? If no, what is the barrier?   No: pending    Patient Care Team:  Lucrecia Lazo MD as PCP - General (Family Medicine)    Medical History Review  Past Medical, Family, and Social History reviewed and does not contribute to the patient presenting condition    Health Maintenance   Topic Date Due    HIV screen  07/21/2006    Flu vaccine (1) 09/01/2018    DTaP/Tdap/Td vaccine (2 - Td) 01/22/2028
Cardiovascular: Normal rate, regular rhythm, normal heart sounds and intact distal pulses. No murmur heard. Pulmonary/Chest: Effort normal and breath sounds normal. He has no wheezes. Abdominal: Soft. Bowel sounds are normal. There is no tenderness. Musculoskeletal: He exhibits edema. Lymphadenopathy:     He has no cervical adenopathy. Neurological: He is alert and oriented to person, place, and time. Skin: He is not diaphoretic. Nursing note and vitals reviewed. BP Readings from Last 3 Encounters:   07/09/18 130/70   06/11/18 132/86   01/22/18 (!) 145/97     /70 (Site: Left Arm, Position: Sitting, Cuff Size: Medium Adult)   Pulse 90   Temp 96.8 °F (36 °C)   Ht 5' 6\" (1.676 m)   Wt 141 lb 12.8 oz (64.3 kg)   BMI 22.89 kg/m²   No results found for: WBC, HGB, HCT, PLT, CHOL, TRIG, HDL, LDLDIRECT, ALT, AST, NA, K, CL, CREATININE, BUN, CO2, TSH, PSA, INR, GLUF, LABA1C, LABMICR  No results found for: CALCIUM, PHOS  No results found for: LDLCALC, LDLCHOLESTEROL, LDLDIRECT    Assessment and Plan:    1. Pneumonia due to infectious organism, unspecified laterality, unspecified part of lung  We'll prescribe Augmentin oral suspension. Prescribe Mucinex oral suspension  Prescribe Tylenol oral suspension  Follow-up in 2 weeks    2. Acute systolic congestive heart failure ([facility] [address]  Continue current treatment with ACE inhibitor, beta blocker and aspirin. Patient to make follow-up appointment with cardiologist.      Cabrera Zayas received counseling on the following healthy behaviors: nutrition, exercise and medication adherence  Reviewed prior labs and health maintenance  Continue current medications, diet and exercise. Discussed use, benefit, and side effects of prescribed medications. Barriers to medication compliance addressed. Patient given educational materials - see patient instructions  Was a self-tracking handout given in paper form or via [employer]ehart?  Yes    Requested Prescriptions     Signed
mg/kg by mouth 2 times daily      Incontinence Supply Disposable (PROCARE ADULT BRIEFS LARGE) MISC Use as directed 5 each 5        Medications patient taking as of now reconciled against medications ordered at time of hospital discharge: {YES / GF:03179}    Vitals:    07/09/18 1513   BP: 130/70   Site: Left Arm   Position: Sitting   Cuff Size: Medium Adult   Pulse: 90   Temp: 96.8 °F (36 °C)   Weight: 141 lb 12.8 oz (64.3 kg)   Height: 5' 6\" (1.676 m)     Body mass index is 22.89 kg/m². Wt Readings from Last 3 Encounters:   07/09/18 141 lb 12.8 oz (64.3 kg)   06/11/18 147 lb 3.2 oz (66.8 kg)   05/24/18 146 lb 12.8 oz (66.6 kg)     BP Readings from Last 3 Encounters:   07/09/18 130/70   06/11/18 132/86   01/22/18 (!) 145/97        Inpatient course: Discharge summary reviewed- see chart. Chief Complaint   Patient presents with    Follow-up     ed follow up [facility]     History of Present illness - Follow up of Hospital diagnosis(es): ***      Non face to face  following discharge, date last encounter closed (first attempt may have been earlier): *No documented post hospital discharge outreach found in the last 14 days    Call initiated 2 business days of discharge: *No response recorded in the last 14 days     Interval history/Current status: ***    Physical Exam:  {GENERAL PHYSICAL LICT:86385}        Assessment/Plan:  Dany Ontiveros was seen today for follow-up.     Diagnoses and all orders for this visit:    Pneumonia due to infectious organism, unspecified laterality, unspecified part of lung  -     AR DISCHARGE MEDS RECONCILED W/ CURRENT OUTPATIENT MED LIST          Medical Decision Making: {TCMPN4:45428}

## 2018-07-09 NOTE — PATIENT INSTRUCTIONS
home?  Medicines    · Be safe with medicines. Take your medicines exactly as prescribed. Call your doctor if you think you are having a problem with your medicine.     · Do not take any vitamins, over-the-counter medicine, or herbal products without talking to your doctor first. Huey Sena not take ibuprofen (Advil or Motrin) and naproxen (Aleve) without talking to your doctor first. They could make your heart failure worse.     · You may be taking some of the following medicine. ¨ Beta-blockers can slow heart rate, decrease blood pressure, and improve your condition. Taking a beta-blocker may lower your chance of needing to be hospitalized. ¨ Angiotensin-converting enzyme inhibitors (ACEIs) reduce the heart's workload, lower blood pressure, and reduce swelling. Taking an ACEI may lower your chance of needing to be hospitalized again. ¨ Angiotensin II receptor blockers (ARBs) work like ACEIs. Your doctor may prescribe them instead of ACEIs. ¨ Diuretics, also called water pills, reduce swelling. ¨ Potassium supplements replace this important mineral, which is sometimes lost with diuretics. ¨ Aspirin and other blood thinners prevent blood clots, which can cause a stroke or heart attack.    You will get more details on the specific medicines your doctor prescribes. Diet    · Your doctor may suggest that you limit sodium to 2,000 milligrams (mg) a day or less. That is less than 1 teaspoon of salt a day, including all the salt you eat in cooking or in packaged foods. People get most of their sodium from processed foods. Fast food and restaurant meals also tend to be very high in sodium.     · Ask your doctor how much liquid you can drink each day. You may have to limit liquids.    Weight    · Weigh yourself without clothing at the same time each day. Record your weight. Call your doctor if you have a sudden weight gain, such as more than 2 to 3 pounds in a day or 5 pounds in a week.  (Your doctor may suggest a different shortness of breath.     · You are dizzy or lightheaded, or you feel like you may faint.     · You have sudden weight gain, such as more than 2 to 3 pounds in a day or 5 pounds in a week. (Your doctor may suggest a different range of weight gain.)     · You have increased swelling in your legs, ankles, or feet.     · You are suddenly so tired or weak that you cannot do your usual activities.    Watch closely for changes in your health, and be sure to contact your doctor if you develop new symptoms. Where can you learn more? Go to https://JAB Broadbandpepiceweb.LinkCycle. org and sign in to your Crew account. Enter B627 in the Minggl box to learn more about \"Heart Failure: Care Instructions. \"     If you do not have an account, please click on the \"Sign Up Now\" link. Current as of: May 10, 2017  Content Version: 11.6  © 3453-7783 [employer]. Care instructions adapted under license by [facility] ([facility]). If you have questions about a medical condition or this instruction, always ask your healthcare professional. [place] [address] any warranty or liability for your use of this information. Pneumonia: Care Instructions  Your Care Instructions    Pneumonia is an infection of the lungs. Most cases are caused by infections from bacteria or viruses. Pneumonia may be mild or very severe. If it is caused by bacteria, you will be treated with antibiotics. It may take a few weeks to a few months to recover fully from pneumonia, depending on how sick you were and whether your overall health is good. Follow-up care is a key part of your treatment and safety. Be sure to make and go to all appointments, and call your doctor if you are having problems. It's also a good idea to know your test results and keep a list of the medicines you take. How can you care for yourself at home? · Take your antibiotics exactly as directed.  Do not stop taking the medicine just because you are feeling better. You need to take the full course of antibiotics. · Take your medicines exactly as prescribed. Call your doctor if you think you are having a problem with your medicine. · Get plenty of rest and sleep. You may feel weak and tired for a while, but your energy level will improve with time. · To prevent dehydration, drink plenty of fluids, enough so that your urine is light yellow or clear like water. Choose water and other caffeine-free clear liquids until you feel better. If you have kidney, heart, or liver disease and have to limit fluids, talk with your doctor before you increase the amount of fluids you drink. · Take care of your cough so you can rest. A cough that brings up mucus from your lungs is common with pneumonia. It is one way your body gets rid of the infection. But if coughing keeps you from resting or causes severe fatigue and chest-wall pain, talk to your doctor. He or she may suggest that you take a medicine to reduce the cough. · Use a vaporizer or humidifier to add moisture to your bedroom. Follow the directions for cleaning the machine. · Do not smoke or allow others to smoke around you. Smoke will make your cough last longer. If you need help quitting, talk to your doctor about stop-smoking programs and medicines. These can increase your chances of quitting for good. · Take an over-the-counter pain medicine, such as acetaminophen (Tylenol), ibuprofen (Advil, Motrin), or naproxen (Aleve). Read and follow all instructions on the label. · Do not take two or more pain medicines at the same time unless the doctor told you to. Many pain medicines have acetaminophen, which is Tylenol. Too much acetaminophen (Tylenol) can be harmful. · If you were given a spirometer to measure how well your lungs are working, use it as instructed. This can help your doctor tell how your recovery is going.   · To prevent pneumonia in the future, talk to your doctor about getting a flu vaccine (once a year) and a pneumococcal vaccine (one time only for most people). When should you call for help? Call 911 anytime you think you may need emergency care. For example, call if:    · You have severe trouble breathing.    Call your doctor now or seek immediate medical care if:    · You cough up dark brown or bloody mucus (sputum).     · You have new or worse trouble breathing.     · You are dizzy or lightheaded, or you feel like you may faint.    Watch closely for changes in your health, and be sure to contact your doctor if:    · You have a new or higher fever.     · You are coughing more deeply or more often.     · You are not getting better after 2 days (48 hours).     · You do not get better as expected. Where can you learn more? Go to https://Smallaa.Skeleton Technologies. org and sign in to your [employer] account. Enter D336 in the Make Works box to learn more about \"Pneumonia: Care Instructions. \"     If you do not have an account, please click on the \"Sign Up Now\" link. Current as of: December 6, 2017  Content Version: 11.6  © 8890-8151 [employer]. Care instructions adapted under license by [facility] ([facility]). If you have questions about a medical condition or this instruction, always ask your healthcare professional. [address] any warranty or liability for your use of this information.

## 2018-07-30 NOTE — PROGRESS NOTES
Attending Physician Statement  I have discussed the care of Lazarus Terrance, including pertinent history and exam findings,  with the resident. I have reviewed the key elements of all parts of the encounter with the resident. I agree with the assessment, plan and orders as documented by the resident.   (GE Modifier)

## 2018-07-30 NOTE — PROGRESS NOTES
Visit Information    Have you changed or started any medications since your last visit including any over-the-counter medicines, vitamins, or herbal medicines? no   Have you stopped taking any of your medications? Is so, why? -  no  Are you having any side effects from any of your medications? - no    Have you seen any other physician or provider since your last visit?  no   Have you had any other diagnostic tests since your last visit?  no   Have you been seen in the emergency room and/or had an admission in a hospital since we last saw you?  no   Have you had your routine dental cleaning in the past 6 months?  no     Do you have an active MyChart account? If no, what is the barrier?   No: code     Patient Care Team:  Hilda Gomez MD as PCP - General (Family Medicine)    Medical History Review  Past Medical, Family, and Social History reviewed and does not contribute to the patient presenting condition    Health Maintenance   Topic Date Due    Potassium monitoring  1991    Creatinine monitoring  1991    HIV screen  2006    Pneumococcal med risk (1 of 1 - PPSV23) 2010    Flu vaccine (1) 2018    DTaP/Tdap/Td vaccine (2 - Td) 2028

## 2018-07-30 NOTE — PROGRESS NOTES
Subjective:    José Luis Dowell is a 33 y.o. male with  has a past medical history of Acute systolic congestive heart failure ([address] Asthma; and Development delay. HPI patient is a 45-year-old male, with a past medical history of systolic heart failure, who presents with decreased appetite. Patient is recovering from a recent episode of pneumonia which was treated inpatient and outpatient with antibiotics. Pneumonia symptoms including cough and fever have resolved. However, father states patient is having decreased appetite. He is taking liquids just fine but has no appetite for solid foods. He has loss more than 15 pounds in the last 6 weeks. No other complaints. Review of Systems   Constitutional: Positive for weight loss. Negative for chills and fever. Decreased appetite   Eyes: Negative for blurred vision. Respiratory: Negative for cough, shortness of breath and wheezing. Cardiovascular: Negative for chest pain. Gastrointestinal: Negative for abdominal pain, constipation, diarrhea, nausea and vomiting. Neurological: Negative for dizziness and headaches. Objective:    /70 (Site: Left Arm, Position: Sitting, Cuff Size: Medium Adult)   Pulse 80   Temp 96.2 °F (35.7 °C) (Temporal)   Ht 5' 6\" (1.676 m)   Wt 123 lb 9.6 oz (56.1 kg)   BMI 19.95 kg/m²    BP Readings from Last 3 Encounters:   07/30/18 120/70   07/09/18 130/70   06/11/18 132/86     Physical Exam   Constitutional: He is oriented to person, place, and time and well-developed, well-nourished, and in no distress. No distress. HENT:   Head: Normocephalic and atraumatic. Cardiovascular: Normal rate, regular rhythm, normal heart sounds and intact distal pulses. No murmur heard. Pulmonary/Chest: Effort normal and breath sounds normal. He has no wheezes. Lymphadenopathy:     He has no cervical adenopathy. Neurological: He is alert and oriented to person, place, and time. Skin: He is not diaphoretic.    Nursing

## 2018-08-06 NOTE — TELEPHONE ENCOUNTER
Step mother called asking for order for chest x-ray, caller states patient has had increased coughing causing him to vomit. Home health nurse advised he get one due to his inability to communicate how he is feeling. Please advise.

## 2018-08-10 NOTE — PATIENT INSTRUCTIONS
Thank you for letting us take care of you today. We hope all your questions were addressed. If a question was overlooked or something else comes to mind after you return home, please contact a member of your Care Team listed below. Please make sure you have a routine office visit set up to follow-up on [address]. Your Care Team at [place] [address] is Team #2  Melissa Greenfield DO (Faculty)  Tatum Cali MD (Resident)  Servando Borja MD (Resident)  Olegario Meyer MD (Resident)  Regi Tate MD (Resident)  Dawna Interiano MD (Resident)  Lou Estrada ILYA Carvajal., Romeo Collier, [address] ([address] [facility])  Jeb Dickens RN, ([address] [place] )  Ar Tyler, Ph.D., (Behavioral Services)  Sammy Brand [facility] (Clinical Pharmacist)     Office phone number: [phone]    If you need to get in right away due to illness, please be advised we have \"Same Day\" appointments available Monday-Friday. Please call us at [phone] option #3 to schedule your \"Same Day\" appointment. Patient Education        Cough: Care Instructions  Your Care Instructions    A cough is your body's response to something that bothers your throat or airways. Many things can cause a cough. You might cough because of a cold or the flu, bronchitis, or asthma. Smoking, postnasal drip, allergies, and stomach acid that backs up into your throat also can cause coughs. A cough is a symptom, not a disease. Most coughs stop when the cause, such as a cold, goes away. You can take a few steps at home to cough less and feel better. Follow-up care is a key part of your treatment and safety. Be sure to make and go to all appointments, and call your doctor if you are having problems. It's also a good idea to know your test results and keep a list of the medicines you take. How can you care for yourself at home? · Drink lots of water and other fluids.  This helps thin the mucus and soothes a dry or sore throat. Honey or lemon juice in hot water or tea may ease a dry cough. · Take cough medicine as directed by your doctor. · Prop up your head on pillows to help you breathe and ease a dry cough. · Try cough drops to soothe a dry or sore throat. Cough drops don't stop a cough. Medicine-flavored cough drops are no better than candy-flavored drops or hard candy. · Do not smoke. Avoid secondhand smoke. If you need help quitting, talk to your doctor about stop-smoking programs and medicines. These can increase your chances of quitting for good. When should you call for help? Call 911 anytime you think you may need emergency care. For example, call if:    · You have severe trouble breathing.    Call your doctor now or seek immediate medical care if:    · You cough up blood.     · You have new or worse trouble breathing.     · You have a new or higher fever.     · You have a new rash.    Watch closely for changes in your health, and be sure to contact your doctor if:    · You cough more deeply or more often, especially if you notice more mucus or a change in the color of your mucus.     · You have new symptoms, such as a sore throat, an earache, or sinus pain.     · You do not get better as expected. Where can you learn more? Go to https://TaxiForSure.com.Scout Analytics. org and sign in to your Kaleio account. Enter D279 in the [facility] box to learn more about \"Cough: Care Instructions. \"     If you do not have an account, please click on the \"Sign Up Now\" link. Current as of: December 6, 2017  Content Version: 11.7  © 8497-5845 [employer]. Care instructions adapted under license by [facility] ([facility]). If you have questions about a medical condition or this instruction, always ask your healthcare professional. [address] any warranty or liability for your use of this information.

## 2018-08-14 PROBLEM — I50.23 ACUTE ON CHRONIC SYSTOLIC HEART FAILURE, NYHA CLASS 4 (HCC): Status: ACTIVE | Noted: 2018-01-01

## 2018-08-14 NOTE — ED TRIAGE NOTES
Per caregiver pt has been coughing a lot and coughing up thick white sputum. Pt has thrown up a couple of times and having diarrhea. Pt was just being treated for pneumonia recently and has finished abx. Caregiver denies fevers.

## 2018-08-14 NOTE — H&P
bleeding if started. He was d/c on PO Levaquin, Metronidazole, Lasix, Coreg and told to f/u w/pulmonary and PCP. In the ED BNP was 9,034, subsequently he received a dose of Lasix 20 mg. CXR shows mild-to-moderate CHF or PNA. He is admitted to the hospital for CHF exacerbation vs PNA. PAST MEDICAL HISTORY:        has a past medical history of Acute systolic congestive heart failure ([address] Asthma; and Development delay. PAST SURGICAL HISTORY:      has a past surgical history that includes cyst incision and drainage. FAMILY HISTORY:     family history includes Diabetes in his father; No Known Problems in his mother. HOME MEDICATIONS:     Prior to Admission medications    Medication Sig Start Date End Date Taking?  Authorizing Provider   Dextromethorphan-guaiFENesin ([address] FAST-MAX DM MAX) 5-100 MG/5ML LIQD Take 5 ml by mouth every 8 hours as needed for cough 8/10/18  Yes Brady Michel MD   megestrol (MEGACE) 40 MG/ML suspension Take 6 mLs by mouth 2 times daily 7/30/18  Yes Brady Michel MD   Dextromethorphan-guaiFENesin ([address] FAST-MAX DM MAX) 5-100 MG/5ML LIQD Take 5 mLs by mouth every 12 hours as needed (fever or pain) 7/9/18  Yes Brady Michel MD   carvedilol (COREG) 3.125 MG tablet Take 3.125 mg by mouth 2 times daily (with meals)   Yes Historical Provider, MD   lisinopril (PRINIVIL;ZESTRIL) 2.5 MG tablet Take 2.5 mg by mouth daily   Yes Historical Provider, MD   cetirizine (ZYRTEC) 1 MG/ML SOLN syrup Take 10 mLs by mouth daily 6/11/18  Yes Brady Michel MD   guaiFENesin (MUCINEX) 600 MG extended release tablet Take 1 tablet by mouth 2 times daily 5/24/18  Yes Brady Michel MD   albuterol (PROVENTIL) (2.5 MG/3ML) 0.083% nebulizer solution Take 3 mLs by nebulization every 6 hours as needed for Wheezing 5/24/18  Yes Brady Michel MD   levETIRAcetam (KEPPRA) 100 MG/ML solution Take 10 mg/kg by mouth 2 times daily   Yes Historical Provider, MD   Incontinence Supply Disposable (PROCARE ADULT BRIEFS LARGE) MISC Use as directed 1/22/18  Yes Cristina Miller MD   Acetaminophen (TYLENOL) 167 MG/5ML LIQD Take 5 mLs by mouth every 6 hours as needed for Pain or Fever Take as 7/9/18   Cristina Miller MD   amoxicillin-clavulanate (AUGMENTIN) 875-125 MG per tablet Take 1 tablet by mouth 2 times daily    Historical Provider, MD   aspirin 81 MG chewable tablet Take 81 mg by mouth daily    Historical Provider, MD       ALLERGIES:      Seasonal    SOCIAL HISTORY:      reports that he has never smoked. He has never used smokeless tobacco. He reports that he does not drink alcohol or use drugs. REVIEW OF SYSTEMS:       ROS obtained according to father. Patient has Autism and is non-verbal.    CONSTITUTIONAL:  no fevers  EYES: negative for double vision  HEENT: No headaches, no rhinorrhea, no nasal congestion, no sore throat, no difficulty swallowing  RESPIRATORY: dyspnea, no wheezing. CARDIOVASCULAR: negative for chest pain, no palpitations, no fatigue, no edema   GASTROINTESTINAL: Positive for nausea and vomiting, no change in bowel habits, no abdominal pain         PHYSICAL EXAM:     Vitals:    08/13/18 2303 08/13/18 2305   BP:  (!) 111/90   Pulse:  116   Resp:  (!) 36   Temp:  98.4 °F (36.9 °C)   TempSrc:  Oral   SpO2:  96%   Weight: 125 lb (56.7 kg)    Height: 5' 6\" (1.676 m)        No intake or output data in the 24 hours ending 08/14/18 0248    General Appearance  Alert , awake , oriented x 3, not in acute distress  HEENT - Head is normocephalic, atraumatic. Lungs - Bilateral equal air entry , no wheezes, rales or rhonchi, aeration good  Cardiovascular - Heart sounds are normal.  Regular rhythm, normal rate without murmur, gallop or rub.     DIAGNOSTICS:      Laboratory Testing:    Recent Results (from the past 24 hour(s))   CBC WITH AUTO DIFFERENTIAL    Collection Time: 08/14/18 12:06 AM   Result Value Ref Range    WBC 8.0 3.5 - 11.3 k/uL    RBC 4.84 4.21 - 5.77 m/uL    Hemoglobin 14.5 13.0 - 17.0 Comment          GFR Staging NOT REPORTED          Imaging/Diagonstics:  Xr Chest Standard (2 Vw)    Result Date: 8/13/2018  EXAMINATION: TWO VIEWS OF THE CHEST 8/13/2018 11:07 pm COMPARISON: None. HISTORY: ORDERING SYSTEM PROVIDED HISTORY: cough TECHNOLOGIST PROVIDED HISTORY: Reason for exam:->cough Ordering Physician Provided Reason for Exam: cough Acuity: Unknown Type of Exam: Unknown FINDINGS: Moderate cardiomegaly and mild edema. Mediastinum normal.  Bony thorax intact. Mild-to-moderate CHF or pneumonia         ASSESSMENT:       Active Problems:    * No active hospital problems. *  Resolved Problems:    * No resolved hospital problems. *      PLAN:       Patient status: Admit as inpatient in the Progressive Unit     Orders Placed This Encounter   Procedures    XR CHEST STANDARD (2 VW)    CBC WITH AUTO DIFFERENTIAL    SPECIMEN REJECTION    PREVIOUS SPECIMEN    Brain Natriuretic Peptide    Comprehensive Metabolic Panel    Inpatient consult to [facility]    Inpatient consult to [facility]    Restraints violent or self-destructive adult (older than 12yo)         DVT Prophylaxis: Lovenox SQ  GI Prophylaxis: Pepcid PO      Acute CHF exacerbation vs viral PNA  Lasix 20 IV daily  Levaquin  Blood cx  CRP  Procalcitonin  Cardiology consult  Cardiac diet  Repeat EKG in AM  Troponins  ASA  Repeat CXR  Continue home Coreg    Seizure Disorder, stable  Continue home dose of Keppra  Follow w/[facility] o/p (last visit 2017)    Hyponatremia  Holding lisinopril for now    Hypokalemia  KSS    Poor appetite  Dietary consult      Above plan discussed with the patient and father in room, who agreed to the above plan     Plan will be discussed with the attending, Dr. Edelmira Us. Arina Hussein MD  Transitional Year Resident  8/14/2018 2:48 AM     Attending Physician Statement  I have discussed the care off. First name Reedincluding pertinent history and exam findings,  with the resident.  I have seen and examined the patient and the key elements of all parts of the encounter have been performed by me. I agree with the assessment, plan and orders as documented by the resident. (GC Modifier)      Acute systolic CHF- Patient on Coreg and Lasix/ Await Cardiology input  Seizure disorder on Keppra-Seizure free for 5 years per patients father Check levels  Loss of appetite- DC Megace and start Periactin 2 mg po tid.

## 2018-08-14 NOTE — CARE COORDINATION
Case Management Initial Discharge Plan  Dara Ni,         Readmission Risk              Risk of Unplanned Readmission:        11               Met with:patient to discuss discharge plans.    Information verified: address, contacts, phone number, , insurance Yes  PCP: Live Weinberg MD  Date of last visit: past 6 months    Insurance Provider: [facility]    Discharge Planning    Living Arrangements:  Family Members   Support Systems:  Family Members    Home has 1 stories  few stairs to climb to get into front door,  0 stairs to climb to reach second floor  Location of bedroom/bathroom in home main    Patient able to perform ADL's:Independent    Current Services (outpatient & in home)  Home Care  DME equipment: none  DME provider: none    Pharmacy: [facility] on [address] Medications:  No  Does patient want to participate in local refill/ meds to beds program?  No    Potential Assistance Needed:  N/A    Patient agreeable to home care: Yes  Freedom of choice provided:  Current with [facility]    Prior SNF/Rehab Placement and Facility:   Agreeable to SNF/Rehab: No  [place] of choice provided: n/a   Evaluation: no    Expected Discharge date:  18  Patient expects to be discharged to:  Home  Follow Up Appointment: Best Day/ Time: Monday AM    Transportation provider: Father  Transportation arrangements needed for discharge: No    Discharge Plan: return to home with Father and current services with [facility]        Electronically signed by Hope RN on 18 at 10:55 AM

## 2018-08-14 NOTE — PROGRESS NOTES
Patient refusing to wear his telemetry. Patient's father educated on need to wear and MD updated. Will continue to monitor.

## 2018-08-14 NOTE — PROGRESS NOTES
Congestive Heart Failure Education completed with patient's father and charted. CHF booklet given. Patient's father was receptive to education. Discussed 2000 mg sodium restricted daily in diet. instructed to limit fluid intake to  1.5 to 2 liters per day. Patient's father was  instructed to weigh pt at the same time of each day each morning, reinforced teaching to monitor for 3-5 lb weight increase over 1-2 days notify physician if change noted. Signs and symptoms of CHF discussed with him, such as feeling more tired than normal, feeling short of breath, coughing that increases when you lie down, sudden weight gain, swelling of your feet, legs or belly. Patient's father verbalized understanding to notify physician office if these symptoms occur.

## 2018-08-14 NOTE — ED PROVIDER NOTES
I performed a history and physical examination of the patient and discussed management with the resident. I reviewed the residents note and agree with the documented findings and plan of care. Any areas of disagreement are noted on the chart. I was personally present for the key portions of any procedures. I have documented in the chart those procedures where I was not present during the key portions. I have reviewed the emergency nurses triage note. I agree with the chief complaint, past medical history, past surgical history, allergies, medications, social and family history as documented unless otherwise noted below. Documentation of the HPI, Physical Exam and Medical Decision Making performed by medical students or scribes is based on my personal performance of the HPI, PE and MDM. For Phys Assistant/ Nurse Practitioner cases/documentation I have personally evaluated this patient and have completed at least one if not all key elements of the E/M (history, physical exam, and MDM). I find the patient's history and physical exam are consistent with the NP/PA documentation. I agree with the care provided, treatment rendered, disposition and followup plan. Additional findings are as noted. Brent Flores. Carlie Macias MD  Attending Emergency  Physician    COUGH PROD OF WHITISH SPUTUM FOR PAST WEEK. ?DYSPNEA ON EXERTION? NO FEVER, CHILLS, HEMOPTYSIS, CHEST PAIN, ABD PAIN. OCC LOOSE STOOLS. NO HEMATOCHEZIA, MELENA. OCC POSTTUSSIVE EMESIS. NO HEMATEMESIS OR HEMOPTYSIS. HX OF AUTISM, CHF, SEIZURE DISORDER. RECENTLY ADMITTED AT [facility] AND TX'ED FOR PNEUMONIA, COMPLETED COURSE OF PRESCRIBED ATB'S AFTER DISCHARGE. CHF MEDS RAN OUT TWO WEEKS AGO AND DAD REPORTS [facility]FP MD DECLINED TO RENEW OR REWRITE RX'ES FOR SAME. NO SWELLING OF LOWER EXTR'S. AWAKE, ALERT, COOP, RESP. LUNGS CLEAR ELDA. NO RALES, RHONCHI, WHEEZES, STRIDOR, RETRACTIONS. CARDIAC-S1S2, WITH S3 GALLOP? RRR, NO MR.  ABD SOFT, NONDISTENDED, NONTENDER.   NORMAL BOWEL

## 2018-08-14 NOTE — PROGRESS NOTES
Good    AROM RLE (degrees)  RLE AROM: WFL  AROM LLE (degrees)  LLE AROM : WFL  AROM RUE (degrees)  RUE AROM : WFL  AROM LUE (degrees)  LUE AROM : WFL  Strength RLE  Strength RLE: WFL  Comment: 4/5  Strength LLE  Strength LLE: WFL  Comment: 4/5  Strength RUE  Strength RUE: WFL  Comment: 4/5  Strength LUE  Strength LUE: WFL  Comment: 4/5  Tone RLE  RLE Tone: Normotonic  Tone LLE  LLE Tone: Normotonic  Motor Control  Gross Motor?: WFL  Sensation  Overall Sensation Status: WFL     Bed mobility  Bridging: Independent  Rolling to Left: Independent  Supine to Sit: Independent  Scooting: Independent  Comment: Patient sat in chair at end of session  Transfers  Sit to Stand: Supervision  Stand to sit: Supervision  Bed to Chair: Supervision  Ambulation  Ambulation?: Yes  Ambulation 1  Surface: level tile  Device: No Device  Assistance: Stand by assistance  Quality of Gait: Patient demos some mild unsteadiness upon initial ambulation that resolved as he walked more. Required SBA for safety only  Distance: 40 feet x 1  Stairs/Curb  Stairs?: No     Balance  Posture: Good  Sitting - Static: Good  Sitting - Dynamic: Good  Standing - Static: Good  Standing - Dynamic: Fair;+      Assessment      Patient tolerated session well with no deficits noted in bed mobility, transfers, ambulation, and balance, and endurance this session. Patient's endurance is limiting factor with mobility at this time and patient demos no deficits as compared to prior level of function. At current level of function, patient is IND and safe with all mobility with supervision of father at all times and does not demo need for continued inpatient PT services. Dad states that he is comfortable with making sure Mady Wu gets up regularly throughout day to prevent further loss of endurance while he recovers from CHF exacerbation.  Patient will likely be safe to return home with 24 hour supervision and assist as needed to promote improved safety and IND with all functional mobility after discharge. Prognosis: Good  Decision Making: Low Complexity  Patient Education: Discussed with patient's father importance of keeping him mobile to improve endurance and prevent loss of strength  Barriers to Learning: Patient is autistic and primarily non-verbal  No Skilled PT: At baseline function  REQUIRES PT FOLLOW UP: No  Activity Tolerance  Activity Tolerance: Patient Tolerated treatment well         Plan   Plan  Plan Comment: D/C PT  Safety Devices  Type of devices: All fall risk precautions in place, Call light within reach, Gait belt, Left in chair, Nurse notified    G-Code  PT G-Codes  Functional Assessment Tool Used: [facility]-PAC  Score: 17  Functional Limitation: Mobility: Walking and moving around  Mobility: Walking and Moving Around Current Status (): At least 20 percent but less than 40 percent impaired, limited or restricted  Mobility: Walking and Moving Around Goal Status ():  At least 1 percent but less than 20 percent impaired, limited or restricted     AM-PAC Score     AM-PAC Inpatient Mobility without Stair Climbing Raw Score : 17  AM-PAC Inpatient without Stair Climbing T-Scale Score : 48.47  Mobility Inpatient CMS 0-100% Score: 32.72  Mobility Inpatient without Stair CMS G-Code Modifier : CJ       Therapy Time   Individual Concurrent Group Co-treatment   Time In 0915         Time Out 0946         Minutes [address], [address] [facility]

## 2018-08-14 NOTE — PROGRESS NOTES
Measures:  · Ht: 5' 6\" (167.6 cm)   · Current Body Wt: 132 lb (59.9 kg)  · % Weight Change: 10%,  2 months per EHR  · Ideal Body Wt: 142 lb (64.4 kg), % Ideal Body 93%  · BMI Classification: BMI 18.5 - 24.9 Normal Weight  · Comparative Standards (Estimated Nutrition Needs):  · Estimated Daily Total Kcal: 2467-7556 kcals/day  · Estimated Daily Protein (g): 70-80 g/day    Estimated Intake vs Estimated Needs: Insufficient Data    Nutrition Risk Level: High    Nutrition Interventions:   Modify current diet, Start ONS  Continued Inpatient Monitoring, Education Not Indicated    Nutrition Evaluation:   · Evaluation: Goals set   · Goals: Meet greater than 50% of estimated nutrient needs with PO intake. · Monitoring: Meal Intake, Supplement Intake, Diet Tolerance, Weight, Comparative Standards, Nausea or Vomiting, Diarrhea    See Adult Nutrition Doc Flowsheet for more detail.      Electronically signed by Asaf Lazaro MS, RD, LD on 8/14/18 at 12:06 PM    Contact Number: [phone]

## 2018-08-14 NOTE — PLAN OF CARE
Problem: Nutrition  Goal: Optimal nutrition therapy  Outcome: Ongoing  Nutrition Problem: Inadequate oral intake  Intervention: Food and/or Nutrient Delivery: Modify current diet, Start ONS  Nutritional Goals: Meet greater than 50% of estimated nutrient needs with PO intake.

## 2018-08-15 NOTE — PROGRESS NOTES
[address] [facility]  Progress Note    Date:   8/15/2018  Patient name:  Nigel Fisher  Date of admission:  8/13/2018 10:52 PM  MRN:   [id number]  YOB: 1990    SUBJECTIVE/Last 24 hours update:     Patient seen and examined at the bed side, patient nonverbal all history obtained from father, no new acute events overnight, no new complaints. SOB ongoing. Continues having poor PO intake, protein supplement on lunch tray not consumed, father aware and working on it. Denies fever/chills, chest pain, or night seats. HPI:   The patient is a 33 y.o.  male with history of viral cardiomyopathy and systolic CHF,   who presented with cough and sputum production. The patient has autism and is non-verbal, subsequently the HPI was obtained from father in the room and EMR. The patient was recently admitted in June to [facility] for septic PNA where he was treated with antibiotics. However while there, he had an ECHO that identified an EF of <10%. Cardiology was consulted and reccommended he be started on lasix and coreg. Unfortunately, he never got the Lasix and has only been taking the Coreg according to his father which ran out 2 weeks ago. Dad states his son never seemed to improve since his PNA admission and has precipitously gone \"down hill\" since then. He has had a cough with white sputum production since discharge in June and seems to have gotten worse over time. Dad denies chest pain, fevers, chills, or night sweats. The patient has been more nauseated and had 2 episodes of non-bloody, non-bilious emesis over the weekend. Dad says he has been a little more SOB than normal, \"he seems to be fighting for that breath more\". He has had a significant decrease in appetite over the last week or two with a 10 lbs weight loss in the last week.      Previous admission in June he was found to have cardiomegaly and Cardiology was consulted.  He had an urgent ECHO hours as needed (fever or pain) 7/9/18  Yes Casandra Cushing, MD   carvedilol (COREG) 3.125 MG tablet Take 3.125 mg by mouth 2 times daily (with meals)   Yes Historical Provider, MD   lisinopril (PRINIVIL;ZESTRIL) 2.5 MG tablet Take 2.5 mg by mouth daily   Yes Historical Provider, MD   cetirizine (ZYRTEC) 1 MG/ML SOLN syrup Take 10 mLs by mouth daily 6/11/18  Yes Casandra Cushing, MD   guaiFENesin ([address]) 600 MG extended release tablet Take 1 tablet by mouth 2 times daily 5/24/18  Yes Casandra Cushing, MD   albuterol (PROVENTIL) (2.5 MG/3ML) 0.083% nebulizer solution Take 3 mLs by nebulization every 6 hours as needed for Wheezing 5/24/18  Yes Casandra Cushing, MD   levETIRAcetam (KEPPRA) 100 MG/ML solution Take 10 mg/kg by mouth 2 times daily   Yes Historical Provider, MD   Incontinence Supply Disposable ([address]) MISC Use as directed 1/22/18  Yes Casandra Cushing, MD   Acetaminophen (TYLENOL) 167 MG/5ML LIQD Take 5 mLs by mouth every 6 hours as needed for Pain or Fever Take as 7/9/18   Casandra Cushing, MD   amoxicillin-clavulanate (AUGMENTIN) 875-125 MG per tablet Take 1 tablet by mouth 2 times daily    Historical Provider, MD   aspirin 81 MG chewable tablet Take 81 mg by mouth daily    Historical Provider, MD       ALLERGIES:     Seasonal      OBJECTIVE:       Vitals:    08/13/18 2305 08/14/18 0351 08/14/18 0850 08/14/18 2014   BP: (!) 111/90 112/87 110/79 117/65   Pulse: 116 93 96 58   Resp: (!) 36 18  16   Temp: 98.4 °F (36.9 °C) 97.7 °F (36.5 °C)  98.1 °F (36.7 °C)   TempSrc: Oral Axillary  Axillary   SpO2: 96% 97%  98%   Weight:  132 lb (59.9 kg)     Height:  5' 6\" (1.676 m)         No intake or output data in the 24 hours ending 08/15/18 3819    PHYSICAL EXAM:  General Appearance  Alert , awake , not in acute distress, nonverbal  HEENT - Head is normocephalic, atraumatic.   Lungs - Bilateral equal air entry , no wheezes, rales or rhonchi, aeration good  Cardiovascular - Heart sounds are normal.

## 2018-08-15 NOTE — CONSULTS
mouth 2 times daily   Yes Historical Provider, MD   Incontinence Supply Disposable (PROCARE ADULT BRIEFS LARGE) MISC Use as directed 1/22/18  Yes 164 Karla Small MD   Acetaminophen (TYLENOL) 167 MG/5ML LIQD Take 5 mLs by mouth every 6 hours as needed for Pain or Fever Take as 7/9/18   164 Karla Small MD   amoxicillin-clavulanate (AUGMENTIN) 875-125 MG per tablet Take 1 tablet by mouth 2 times daily    Historical Provider, MD   aspirin 81 MG chewable tablet Take 81 mg by mouth daily    Historical Provider, MD        Hospital Meds:    Current Facility-Administered Medications   Medication Dose Route Frequency Provider Last Rate Last Dose    lisinopril (PRINIVIL;ZESTRIL) tablet 5 mg  5 mg Oral Daily FOREIGN Ayala MD        furosemide (LASIX) injection 20 mg  20 mg Intravenous BID FOREIGN Ayala MD        aspirin chewable tablet 81 mg  81 mg Oral Daily Justin Charles MD   81 mg at 08/14/18 0850    carvedilol (COREG) tablet 3.125 mg  3.125 mg Oral BID  Justin Charles MD   3.125 mg at 08/14/18 1715    sodium chloride flush 0.9 % injection 10 mL  10 mL Intravenous 2 times per day Justin Charles MD   10 mL at 08/14/18 2140    sodium chloride flush 0.9 % injection 10 mL  10 mL Intravenous PRN Justin Charles MD        enoxaparin (LOVENOX) injection 40 mg  40 mg Subcutaneous Daily Justin Charles MD   40 mg at 08/14/18 0850    potassium chloride (KLOR-CON M) extended release tablet 40 mEq  40 mEq Oral PRN Justin Charles MD        Or    potassium chloride 20 MEQ/15ML (10%) oral solution 40 mEq  40 mEq Oral PRN Justin Charles MD        Or    potassium chloride 10 mEq/100 mL IVPB (Peripheral Line)  10 mEq Intravenous PRN Justin Charles MD        levETIRAcetam (KEPPRA) 100 MG/ML solution 500 mg  500 mg Oral BID Pamela Guy MD   500 mg at 08/14/18 2139    cyproheptadine 2 MG/5ML syrup 2 mg  2 mg Oral Q6H Pamela Guy MD   2 mg at 08/14/18 1715    acetaminophen (TYLENOL) tablet 650 mg  650 mg Oral Q4H PRN Yaquelin 72 hours. MAG: No results for input(s): MG in the last 72 hours. D Dimer: No results for input(s): DDIMER in the last 72 hours. Troponin T   Recent Labs      08/14/18   0439  08/14/18   0852  08/14/18   1622   TROPONINT  <0.03  <0.03  <0.03     ProBNP No results for input(s): BNP in the last 72 hours. Diagnosis:  Principal Problem:    Acute systolic congestive heart failure (HCC)  Active Problems:    Seizure disorder (HCC)    Loss of appetite  Resolved Problems:    * No resolved hospital problems. *      Plan:    1. Acute on Chronic systolic CHF - add back coreg and lisinopril. Increase lasix to 20iv bid. Outpatient CHF clinic. Repeat limited echo with definity to assess EF and rule out any apical thrombus.     Electronically signed by Jewel Krabbe, MD on 8/15/2018 at 8:53 AM

## 2018-08-16 NOTE — PROGRESS NOTES
thrombus in the atria, moderate MR and TR, and severe global hypokinesis. He was not started on [facility] d/t being a high risk of bleeding if started. He was d/c on PO Levaquin, Metronidazole, Lasix, Coreg and told to f/u w/pulmonary and PCP.     In the ED BNP was 9,034, subsequently he received a dose of Lasix 20 mg. CXR shows mild-to-moderate CHF or PNA. He is admitted to the hospital for CHF exacerbation vs PNA. REVIEW OF SYSTEMS:      ROS obtained according to father. Patient has Autism and is non-verbal.     CONSTITUTIONAL:  no fevers  EYES: negative for double vision  HEENT: No headaches, no rhinorrhea, no nasal congestion, no sore throat, no difficulty swallowing  RESPIRATORY: dyspnea, no wheezing. CARDIOVASCULAR: negative for chest pain, no palpitations, no fatigue, no edema   GASTROINTESTINAL: Positive for nausea and vomiting, no change in bowel habits, no abdominal pain     PAST MEDICAL HISTORY:      has a past medical history of Acute systolic congestive heart failure ([address] Asthma; Development delay; and Seizures ([address] PAST SURGICAL HISTORY:      has a past surgical history that includes cyst incision and drainage. SOCIAL HISTORY:      reports that he has never smoked. He has never used smokeless tobacco. He reports that he does not drink alcohol or use drugs. FAMILY HISTORY:     family history includes Diabetes in his father; No Known Problems in his mother. HOME MEDICATIONS:      Prior to Admission medications    Medication Sig Start Date End Date Taking?  Authorizing Provider   Dextromethorphan-guaiFENesin ([address] FAST-MAX DM MAX) 5-100 MG/5ML LIQD Take 5 ml by mouth every 8 hours as needed for cough 8/10/18  Yes Meghan Velasquez MD   megestrol (MEGACE) 40 MG/ML suspension Take 6 mLs by mouth 2 times daily 7/30/18  Yes Meghan Velasquez MD   Dextromethorphan-guaiFENesin ([address] FAST-MAX DM MAX) 5-100 MG/5ML LIQD Take 5 mLs by mouth every 12 hours as needed (fever or pain) 7/9/18  Yes nondistended, no masses or organomegaly  Skin - No bruising or bleeding on exposed skin area  Extremities - No cyanosis, clubbing or edema    DIAGNOSTICS:     Laboratory Testing:    Recent Results (from the past 24 hour(s))   CBC    Collection Time: 08/15/18  7:30 PM   Result Value Ref Range    WBC 7.0 3.5 - 11.3 k/uL    RBC 5.00 4.21 - 5.77 m/uL    Hemoglobin 15.0 13.0 - 17.0 g/dL    Hematocrit 47.6 40.7 - 50.3 %    MCV 95.2 82.6 - 102.9 fL    MCH 30.0 25.2 - 33.5 pg    MCHC 31.5 28.4 - 34.8 g/dL    RDW 16.9 (H) 11.8 - 14.4 %    Platelets 208 673 - 143 k/uL    MPV 11.5 8.1 - 13.5 fL    NRBC Automated 0.3 (H) 0.0 per 100 WBC   APTT    Collection Time: 08/15/18  7:30 PM   Result Value Ref Range    PTT 21.2 20.5 - 30.5 sec   APTT    Collection Time: 08/15/18 11:27 PM   Result Value Ref Range    PTT 64.5 (H) 20.5 - 30.5 sec   CBC auto differential    Collection Time: 08/16/18  6:01 AM   Result Value Ref Range    WBC 6.8 3.5 - 11.3 k/uL    RBC 4.59 4.21 - 5.77 m/uL    Hemoglobin 13.7 13.0 - 17.0 g/dL    Hematocrit 43.1 40.7 - 50.3 %    MCV 93.9 82.6 - 102.9 fL    MCH 29.8 25.2 - 33.5 pg    MCHC 31.8 28.4 - 34.8 g/dL    RDW 16.7 (H) 11.8 - 14.4 %    Platelets 278 666 - 456 k/uL    MPV 11.6 8.1 - 13.5 fL    NRBC Automated 0.3 (H) 0.0 per 100 WBC    Differential Type NOT REPORTED     Seg Neutrophils 44 36 - 65 %    Lymphocytes 49 (H) 24 - 43 %    Monocytes 6 3 - 12 %    Eosinophils % 0 (L) 1 - 4 %    Basophils 1 0 - 2 %    Immature Granulocytes 0 0 %    Segs Absolute 2.98 1.50 - 8.10 k/uL    Absolute Lymph # 3.30 1.10 - 3.70 k/uL    Absolute Mono # 0.41 0.10 - 1.20 k/uL    Absolute Eos # <0.03 0.00 - 0.44 k/uL    Basophils # 0.05 0.00 - 0.20 k/uL    Absolute Immature Granulocyte 0.03 0.00 - 0.30 k/uL    WBC Morphology NOT REPORTED     RBC Morphology ANISOCYTOSIS PRESENT     Platelet Estimate NOT REPORTED    Basic Metabolic Panel    Collection Time: 08/16/18  6:01 AM   Result Value Ref Range    Glucose 88 70 - 99 mg/dL BUN 12 6 - 20 mg/dL    CREATININE 0.94 0.70 - 1.20 mg/dL    Bun/Cre Ratio NOT REPORTED 9 - 20    Calcium 8.4 (L) 8.6 - 10.4 mg/dL    Sodium 134 (L) 135 - 144 mmol/L    Potassium 4.1 3.7 - 5.3 mmol/L    Chloride 103 98 - 107 mmol/L    CO2 16 (L) 20 - 31 mmol/L    Anion Gap 15 9 - 17 mmol/L    GFR Non-African American >60 >60 mL/min    GFR African American >60 >60 mL/min    GFR Comment          GFR Staging NOT REPORTED    APTT    Collection Time: 08/16/18  6:01 AM   Result Value Ref Range    PTT 34.1 (H) 20.5 - 30.5 sec         Imaging/Diagonstics:  EKG: Sinus tachycardia  Possible Left atrial enlargement  Left axis deviation  Nonspecific T wave abnormality  Abnormal ECG      Current Facility-Administered Medications   Medication Dose Route Frequency Provider Last Rate Last Dose    furosemide (LASIX) injection 20 mg  20 mg Intravenous BID Franchesca Rodas MD        lisinopril (PRINIVIL;ZESTRIL) tablet 5 mg  5 mg Oral Daily P Marisol Montero MD   5 mg at 08/15/18 1045    perflutren lipid microspheres (DEFINITY) injection 0.44 mg  0.4 mL Intravenous ONCE PRN FOREIGN Montero MD        heparin (porcine) injection 3,590 Units  60 Units/kg Intravenous Once Linda Estrada MD        heparin (porcine) injection 3,590 Units  60 Units/kg Intravenous PRN Linda Estrada MD        heparin (porcine) injection 1,800 Units  30 Units/kg Intravenous PRN Linda Estrada MD   1,800 Units at 08/16/18 0651    heparin 25,000 units in dextrose 5% 250 mL infusion  12 Units/kg/hr Intravenous Continuous Linda Estrada MD 8.4 mL/hr at 08/16/18 0651 14 Units/kg/hr at 08/16/18 0651    aspirin chewable tablet 81 mg  81 mg Oral Daily Nika Fenton MD   81 mg at 08/15/18 1045    carvedilol (COREG) tablet 3.125 mg  3.125 mg Oral BID  Nika Fenton MD   3.125 mg at 08/15/18 1045    sodium chloride flush 0.9 % injection 10 mL  10 mL Intravenous 2 times per day Nika Fenton MD   10 mL at 08/15/18 1046    sodium Franchesca Rodas MD  Family Medicine Resident  8/16/2018 8:59 AM       Attending Physician Statement  I have discussed the care off. First name Reedincluding pertinent history and exam findings,  with the resident. I have seen and examined the patient and the key elements of all parts of the encounter have been performed by me. I agree with the assessment, plan and orders as documented by the resident. (GC Modifier)      Acute CHF-EF of <10%  L Ventricle thrombus- Coumadin started per cardio  Patient will FU with CHF clinic.  Father understands and agrees with the plan

## 2018-08-16 NOTE — PROGRESS NOTES
levETIRAcetam (KEPPRA) 100 MG/ML solution 500 mg BID   cyproheptadine 2 MG/5ML syrup 2 mg Q6H   acetaminophen (TYLENOL) tablet 650 mg Q4H PRN   diphenhydrAMINE (BENADRYL) 12.5 MG/5ML elixir 25 mg Q6H PRN     Continuous Infusions:    heparin (porcine) 14 Units/kg/hr (08/16/18 0651)          VITAL SIGNS:    /77   Pulse 102   Temp 97.8 °F (36.6 °C) (Axillary)   Resp 15   Ht 5' 6\" (1.676 m)   Wt 132 lb (59.9 kg)   SpO2 100%   BMI 21.31 kg/m²         Admit Weight:  125 lb (56.7 kg)    Last 3 weights: Wt Readings from Last 3 Encounters:   08/14/18 132 lb (59.9 kg)   08/10/18 127 lb 6.4 oz (57.8 kg)   07/30/18 123 lb 9.6 oz (56.1 kg)       BMI: Body mass index is 21.31 kg/m². INPUT/OUTPUT:      No intake or output data in the 24 hours ending 08/16/18 1008      ECHO[de-identified]   Very technically difficulty study  Global left ventricular systolic function is severely reduced  Estimated ejection fraction is 10 % . severe RV hypokinesis with dilation. Small mobile thrombus (0.91 X 0.54 cm) with a stalk seen in apex of left  ventricle. Dilated left atria is seen  An another small mobile less than 1/2 of size of larger thrombus is seen  which is calcified and likely chronic. Small pericardial effusion. EXAM:     General appearance: In no acute distress. Nonverbal   Lungs: Clear to Auscultation bilaterally  Heart:  regular  Abdomen: Soft nontender  Extremities: No edema  Psych:  Appropriate mood and affect. Awake, alert and oriented x 3.    Other:    Principal Problem:    Acute systolic congestive heart failure (HCC)  Active Problems:    Seizure disorder (HCC)    Loss of appetite  Resolved Problems:    * No resolved hospital problems. *      ASSESSMENT / PLAN   1. Acute on chronic systolic CHF   2. Left ventricular thrombus  3. Non verbal due to his autism    Recommendations:    1. I had a very detailed discussion with his father.   I explained to him that he may have had an LV thrombus, that had been

## 2018-08-16 NOTE — PLAN OF CARE
Problem: Falls - Risk of:  Goal: Absence of physical injury  Absence of physical injury   Outcome: Ongoing

## 2018-08-16 NOTE — PROGRESS NOTES
Pharmacy Note  Warfarin Consult    Estephania Knapp is a 33 y.o. male for whom pharmacy has been consulted to manage warfarin therapy. Consulting Physician: Heidi Dey MD  Reason for Admission: cough, sputum production    Warfarin dose prior to admission: none, new start  Warfarin indication:  LV thrombus  Target INR range: 2-3     Past Medical History:   Diagnosis Date    Acute systolic congestive heart failure ([facility] 7/9/2018    Asthma     Development delay     Seizures (HCC)         No results for input(s): INR in the last 72 hours. Recent Labs      08/14/18   0006  08/15/18   1930  08/16/18   0601   HGB  14.5  15.0  13.7   HCT  44.9  47.6  43.1   PLT  213  185  185     Current warfarin drug-drug interactions:  heparin, ASA 81mg, acetaminophen    DATE INR DOSE   8/16/18 N/A 5mg       Daily PT/INR while inpatient. Thank you for the consult. Will continue to follow.      Electronically signed by Patrick Zelaya on 8/16/18 at 2:58 PM

## 2018-08-16 NOTE — PROGRESS NOTES
Patient continues to refuse to wear telemetry monitor. Whenever writer attempts to apply patches or monitor patient rips leads off and pushes patients hands aware. MD aware. Patients father educated on risks of not wearing monitor. Writer will continue to monitor.

## 2018-08-17 NOTE — PROGRESS NOTES
MG/ML SOLN syrup Take 10 mLs by mouth daily 6/11/18  Yes Live Weinberg MD   guaiFENesin [facility]) 600 MG extended release tablet Take 1 tablet by mouth 2 times daily 5/24/18  Yes Live Weinberg MD   albuterol (PROVENTIL) (2.5 MG/3ML) 0.083% nebulizer solution Take 3 mLs by nebulization every 6 hours as needed for Wheezing 5/24/18  Yes Live Weinberg MD   levETIRAcetam (KEPPRA) 100 MG/ML solution Take 10 mg/kg by mouth 2 times daily   Yes Historical Provider, MD   Incontinence Supply Disposable (PROCARE ADULT BRIEFS LARGE) MISC Use as directed 1/22/18  Yes Live Weinberg MD   Acetaminophen (TYLENOL) 167 MG/5ML LIQD Take 5 mLs by mouth every 6 hours as needed for Pain or Fever Take as 7/9/18   Live Weinberg MD   amoxicillin-clavulanate (AUGMENTIN) 875-125 MG per tablet Take 1 tablet by mouth 2 times daily    Historical Provider, MD   aspirin 81 MG chewable tablet Take 81 mg by mouth daily    Historical Provider, MD       ALLERGIES:     Seasonal      OBJECTIVE:       Vitals:    08/16/18 0630 08/16/18 0800 08/16/18 1830 08/16/18 2000   BP:  127/85 101/68 109/60   Pulse:  60 60 63   Resp:  20  18   Temp:  97.7 °F (36.5 °C)  98 °F (36.7 °C)   TempSrc:  Axillary  Axillary   SpO2: 100% 91%  98%   Weight:       Height:           No intake or output data in the 24 hours ending 08/17/18 0915    PHYSICAL EXAM:  General Appearance  Alert , awake , not in acute distress, nonverbal  HEENT - Head is normocephalic, atraumatic. Lungs - Bilateral equal air entry , no wheezes, rales or rhonchi, aeration good  Cardiovascular - Heart sounds are normal.  Regular rhythm, normal rate without murmur, gallop or rub.   Abdomen - Soft feels full, nontender, nondistended  Skin - No bruising or bleeding on exposed skin area  Extremities - No cyanosis, clubbing or edema    DIAGNOSTICS:     Laboratory Testing:    Recent Results (from the past 24 hour(s))   APTT    Collection Time: 08/16/18 12:46 PM   Result Value Ref Range    PTT 45.6 (H) 20.5 - 30.5 sec   APTT    Collection Time: 08/16/18  5:36 PM   Result Value Ref Range    PTT 66.0 (H) 20.5 - 30.5 sec   APTT    Collection Time: 08/16/18 11:29 PM   Result Value Ref Range    PTT 56.6 (H) 20.5 - 30.5 sec   CBC auto differential    Collection Time: 08/17/18  7:09 AM   Result Value Ref Range    WBC 6.4 3.5 - 11.3 k/uL    RBC 4.47 4.21 - 5.77 m/uL    Hemoglobin 13.6 13.0 - 17.0 g/dL    Hematocrit 41.4 40.7 - 50.3 %    MCV 92.6 82.6 - 102.9 fL    MCH 30.4 25.2 - 33.5 pg    MCHC 32.9 28.4 - 34.8 g/dL    RDW 16.9 (H) 11.8 - 14.4 %    Platelets 530 544 - 452 k/uL    MPV 11.9 8.1 - 13.5 fL    NRBC Automated 0.5 (H) 0.0 per 100 WBC    Differential Type NOT REPORTED     Seg Neutrophils 36 36 - 65 %    Lymphocytes 56 (H) 24 - 43 %    Monocytes 6 3 - 12 %    Eosinophils % 1 1 - 4 %    Basophils 1 0 - 2 %    Immature Granulocytes 0 0 %    Segs Absolute 2.27 1.50 - 8.10 k/uL    Absolute Lymph # 3.62 1.10 - 3.70 k/uL    Absolute Mono # 0.39 0.10 - 1.20 k/uL    Absolute Eos # 0.03 0.00 - 0.44 k/uL    Basophils # 0.03 0.00 - 0.20 k/uL    Absolute Immature Granulocyte <0.03 0.00 - 0.30 k/uL    WBC Morphology NOT REPORTED     RBC Morphology ANISOCYTOSIS PRESENT     Platelet Estimate NOT REPORTED    Basic Metabolic Panel    Collection Time: 08/17/18  7:09 AM   Result Value Ref Range    Glucose 76 70 - 99 mg/dL    BUN 13 6 - 20 mg/dL    CREATININE 0.97 0.70 - 1.20 mg/dL    Bun/Cre Ratio NOT REPORTED 9 - 20    Calcium 8.4 (L) 8.6 - 10.4 mg/dL    Sodium 141 135 - 144 mmol/L    Potassium 3.6 (L) 3.7 - 5.3 mmol/L    Chloride 107 98 - 107 mmol/L    CO2 18 (L) 20 - 31 mmol/L    Anion Gap 16 9 - 17 mmol/L    GFR Non-African American >60 >60 mL/min    GFR African American >60 >60 mL/min    GFR Comment          GFR Staging NOT REPORTED    PROTIME-INR    Collection Time: 08/17/18  7:09 AM   Result Value Ref Range    Protime 14.0 (H) 9.0 - 12.0 sec    INR 1.3    APTT    Collection Time: 08/17/18  7:09 AM   Result Value Ref Range PTT 68.0 (H) 20.5 - 30.5 sec         Imaging/Diagonstics:  EKG: Sinus tachycardia  Possible Left atrial enlargement  Left axis deviation  Nonspecific T wave abnormality  Abnormal ECG      Current Facility-Administered Medications   Medication Dose Route Frequency Provider Last Rate Last Dose    bisacodyl (DULCOLAX) suppository 10 mg  10 mg Rectal Once Inga Rosa MD        furosemide (LASIX) injection 20 mg  20 mg Intravenous BID Inga Rosa MD   Stopped at 08/16/18 2154    warfarin (COUMADIN) daily dosing (placeholder)   Other RX Placeholder FOREIGN Lopez MD        lisinopril (PRINIVIL;ZESTRIL) tablet 5 mg  5 mg Oral Daily P Adin Lopez MD   5 mg at 08/16/18 0959    perflutren lipid microspheres (DEFINITY) injection 0.44 mg  0.4 mL Intravenous ONCE PRN FOREIGN Lopez MD        heparin (porcine) injection 3,590 Units  60 Units/kg Intravenous Once Niyah Melvin MD        heparin (porcine) injection 3,590 Units  60 Units/kg Intravenous PRN Niyah Melvin MD        heparin (porcine) injection 1,800 Units  30 Units/kg Intravenous PRN Niyah Melvin MD   1,800 Units at 08/16/18 1332    heparin 25,000 units in dextrose 5% 250 mL infusion  12 Units/kg/hr Intravenous Continuous Niyah Melvin MD 9.6 mL/hr at 08/16/18 2338 16 Units/kg/hr at 08/16/18 2338    aspirin chewable tablet 81 mg  81 mg Oral Daily Rich Butler MD   81 mg at 08/17/18 0908    carvedilol (COREG) tablet 3.125 mg  3.125 mg Oral BID WC Rich Butler MD   3.125 mg at 08/17/18 0914    sodium chloride flush 0.9 % injection 10 mL  10 mL Intravenous 2 times per day Rich Butler MD   10 mL at 08/16/18 2154    sodium chloride flush 0.9 % injection 10 mL  10 mL Intravenous PRN Rich Butler MD        potassium chloride (KLOR-CON M) extended release tablet 40 mEq  40 mEq Oral PRN Rich Butler MD        Or    potassium chloride 20 MEQ/15ML (10%) oral solution 40 mEq  40 mEq Oral PRN Rich Butler MD

## 2018-08-17 NOTE — PROGRESS NOTES
Pharmacy Note  Warfarin Consult follow-up      Recent Labs      08/17/18   0709   INR  1.3     Recent Labs      08/15/18   1930  08/16/18   0601  08/17/18   0709   HGB  15.0  13.7  13.6   HCT  47.6  43.1  41.4   PLT  185  185  194       Significant Drug-Drug Interactions:  Current warfarin drug-drug interactions:  heparin, ASA 81mg, acetaminophen    DATE INR DOSE   8/16/18 N/A 5mg   8/17/18 1.3 5mg       Notes: Patient's INR 1.3 today. Will continue with 5mg dose. Daily PT/INR while inpatient.      Marylou Wilson, JoséD  PGY-2 Ambulatory Care Pharmacy Resident  [facility] ([facility]) Medication Management  Phone: [phone]  8/17/2018 9:41 AM

## 2018-08-17 NOTE — PROGRESS NOTES
[address] [facility])    Progress Note    2018 2:31 PM      Subjective:  Mr. Deepthi Abebe     Is lying in bed, talking to his day and he feels like he is slowly improving           LABS:     CBC: Recent Labs      08/15/18   1930  18   0601  18   0709   WBC  7.0  6.8  6.4   HGB  15.0  13.7  13.6   HCT  47.6  43.1  41.4   MCV  95.2  93.9  92.6   PLT  185  185  194     BMP: Recent Labs      18   0601  18   0709   NA  134*  141   K  4.1  3.6*   CL  103  107   CO2  16*  18*   BUN  12  13   CREATININE  0.94  0.97     PT/INR:   Recent Labs      18   0709   PROTIME  14.0*   INR  1.3     APTT:   Recent Labs      18   1736  18   2329  18   0709   APTT  66.0*  56.6*  68.0*     MAG: No results for input(s): MG in the last 72 hours. D Dimer: No results for input(s): DDIMER in the last 72 hours. Troponin T   Recent Labs      18   1622   TROPONINT  <0.03     Pro-BNP No results for input(s): BNP in the last 72 hours. Pulse Ox:  SpO2  Av.7 %  Min: 97 %  Max: 98 %  Supplemental O2:       Current Meds:   warfarin (COUMADIN) tablet 5 mg Once   acetaminophen (TYLENOL) 160 MG/5ML solution 650 mg Q4H PRN   furosemide (LASIX) injection 20 mg BID   warfarin (COUMADIN) daily dosing (placeholder) RX Placeholder   lisinopril (PRINIVIL;ZESTRIL) tablet 5 mg Daily   perflutren lipid microspheres (DEFINITY) injection 0.44 mg ONCE PRN   heparin (porcine) injection 3,590 Units Once   heparin (porcine) injection 3,590 Units PRN   heparin (porcine) injection 1,800 Units PRN   heparin 25,000 units in dextrose 5% 250 mL infusion Continuous   aspirin chewable tablet 81 mg Daily   carvedilol (COREG) tablet 3.125 mg BID WC   sodium chloride flush 0.9 % injection 10 mL 2 times per day   sodium chloride flush 0.9 % injection 10 mL PRN   potassium chloride (KLOR-CON M) extended release tablet 40 mEq PRN   Or    potassium chloride 20 MEQ/15ML (10%) oral solution 40 mEq PRN   Or potassium chloride 10 mEq/100 mL IVPB (Peripheral Line) PRN   levETIRAcetam (KEPPRA) 100 MG/ML solution 500 mg BID   cyproheptadine 2 MG/5ML syrup 2 mg Q6H   diphenhydrAMINE (BENADRYL) 12.5 MG/5ML elixir 25 mg Q6H PRN     Continuous Infusions:    heparin (porcine) 16 Units/kg/hr (08/16/18 2338)          VITAL SIGNS:    BP (!) 116/94   Pulse 108   Temp 97.9 °F (36.6 °C) (Axillary)   Resp 18   Ht 5' 6\" (1.676 m)   Wt 132 lb (59.9 kg)   SpO2 97%   BMI 21.31 kg/m²         Admit Weight:  125 lb (56.7 kg)    Last 3 weights: Wt Readings from Last 3 Encounters:   08/14/18 132 lb (59.9 kg)   08/10/18 127 lb 6.4 oz (57.8 kg)   07/30/18 123 lb 9.6 oz (56.1 kg)       BMI: Body mass index is 21.31 kg/m². INPUT/OUTPUT:      No intake or output data in the 24 hours ending 08/17/18 1431    EKG:  sinus    EXAM:     General appearance: In no acute distress. Lungs: Clear to Auscultation bilaterally  Heart:  Regular, gallop  Abdomen: Soft nontender      Principal Problem:    Acute systolic congestive heart failure (HCC)  Active Problems:    Seizure disorder (HCC)    Loss of appetite  Resolved Problems:    * No resolved hospital problems. *      ASSESSMENT / PLAN    1. Acute on chronic systolic CHF   2. Left ventricular thrombus  3. Non verbal due to his autism     continue anticoagulation, target INR is 2-3, please see my note from yesterday, I again had detailed discussions with the patient's father.        the patient should follow up with CHF Clinic as an outpatient     continue IV diuresis     continue Coreg and lisinopril      Electronically signed by Brando Martinez MD on 8/17/2018 at 2:31 PM

## 2018-08-18 NOTE — PROGRESS NOTES
Pharmacy Note  Warfarin Consult follow-up    Consulting Physician: Gregory Bennett MD  Reason for Admission: cough, sputum production    Warfarin dose prior to admission: none, new start  Warfarin indication:  LV thrombus  Target INR range: 2-3     Recent Labs      08/18/18   0618   INR  1.2     Recent Labs      08/16/18   0601  08/17/18   0709  08/18/18   0618   HGB  13.7  13.6  13.8   HCT  43.1  41.4  42.7   PLT  185  194  204       Significant Drug-Drug Interactions:    Current warfarin drug-drug interactions:  heparin gtt, ASA 81mg, acetaminophen    DATE INR DOSE   8/16/18 N/A 5mg   8/17/18 1.3 5mg   8/18/18 1.2 10 mg               Notes: Patients INR subtherapeutic at 1.2. MD requests more aggressive dosing, will give 10 mg dose tonight. Daily PT/INR while inpatient.      Coleman Harrison, PharmD   PGY-1 Pharmacy Resident  8/18/2018 12:23 PM

## 2018-08-18 NOTE — PROGRESS NOTES
bleeding if started. He was d/c on PO Levaquin, Metronidazole, Lasix, Coreg and told to f/u w/pulmonary and PCP.     In the ED BNP was 9,034, subsequently he received a dose of Lasix 20 mg. CXR shows mild-to-moderate CHF or PNA. He is admitted to the hospital for CHF exacerbation vs PNA. REVIEW OF SYSTEMS:      ROS limited obtained according to father. Patient has Autism and is non-verbal.     CONSTITUTIONAL:  no fevers  RESPIRATORY: few dry coughs yesterday  CARDIOVASCULAR: refusing telemetry   GASTROINTESTINAL: positive for constipation     PAST MEDICAL HISTORY:      has a past medical history of Acute systolic congestive heart failure ([facility] [address] Asthma; Development delay; and Seizures ([facility] [address] PAST SURGICAL HISTORY:      has a past surgical history that includes cyst incision and drainage. SOCIAL HISTORY:      reports that he has never smoked. He has never used smokeless tobacco. He reports that he does not drink alcohol or use drugs. FAMILY HISTORY:     family history includes Diabetes in his father; No Known Problems in his mother. HOME MEDICATIONS:      Prior to Admission medications    Medication Sig Start Date End Date Taking?  Authorizing Provider   Dextromethorphan-guaiFENesin ([address] FAST-MAX DM MAX) 5-100 MG/5ML LIQD Take 5 ml by mouth every 8 hours as needed for cough 8/10/18  Yes Asher Okeefe MD   megestrol (MEGACE) 40 MG/ML suspension Take 6 mLs by mouth 2 times daily 7/30/18  Yes Asher Okeefe MD   Dextromethorphan-guaiFENesin ([address] FAST-MAX DM MAX) 5-100 MG/5ML LIQD Take 5 mLs by mouth every 12 hours as needed (fever or pain) 7/9/18  Yes Asher Okeefe MD   carvedilol (COREG) 3.125 MG tablet Take 3.125 mg by mouth 2 times daily (with meals)   Yes Historical Provider, MD   lisinopril (PRINIVIL;ZESTRIL) 2.5 MG tablet Take 2.5 mg by mouth daily   Yes Historical Provider, MD   cetirizine (ZYRTEC) 1 MG/ML SOLN syrup Take 10 mLs by mouth daily 6/11/18  Yes Asher Okeefe MD guaiFENesin (MUCINEX) 600 MG extended release tablet Take 1 tablet by mouth 2 times daily 5/24/18  Yes Janine Eubanks MD   albuterol (PROVENTIL) (2.5 MG/3ML) 0.083% nebulizer solution Take 3 mLs by nebulization every 6 hours as needed for Wheezing 5/24/18  Yes Janine Eubanks MD   levETIRAcetam (KEPPRA) 100 MG/ML solution Take 10 mg/kg by mouth 2 times daily   Yes Historical Provider, MD   Incontinence Supply Disposable ([address]) MISC Use as directed 1/22/18  Yes Janine Eubanks MD   Acetaminophen (TYLENOL) 167 MG/5ML LIQD Take 5 mLs by mouth every 6 hours as needed for Pain or Fever Take as 7/9/18   Janine Eubanks MD   amoxicillin-clavulanate (AUGMENTIN) 875-125 MG per tablet Take 1 tablet by mouth 2 times daily    Historical Provider, MD   aspirin 81 MG chewable tablet Take 81 mg by mouth daily    Historical Provider, MD       ALLERGIES:     Seasonal      OBJECTIVE:       Vitals:    08/17/18 1330 08/17/18 2003 08/18/18 0500 08/18/18 0800   BP: (!) 116/94 121/69  108/72   Pulse: 108 102     Resp: 18 17 16   Temp: 97.9 °F (36.6 °C) 97.6 °F (36.4 °C)  97.7 °F (36.5 °C)   TempSrc: Axillary Axillary     SpO2: 97%      Weight:   127 lb (57.6 kg)    Height:           No intake or output data in the 24 hours ending 08/18/18 0928    PHYSICAL EXAM:  General Appearance  Alert , awake , not in acute distress, nonverbal  HEENT - Head is normocephalic, atraumatic. Lungs - Bilateral equal air entry , no wheezes, rales or rhonchi, aeration good  Cardiovascular - Heart sounds are normal.  Regular rhythm, normal rate without murmur, gallop or rub.   Abdomen - Soft feels full, nontender, nondistended  Skin - No bruising or bleeding on exposed skin area  Extremities - No cyanosis, clubbing or edema    DIAGNOSTICS:     Laboratory Testing:    Recent Results (from the past 24 hour(s))   CBC auto differential    Collection Time: 08/18/18  6:18 AM   Result Value Ref Range    WBC 6.2 3.5 - 11.3 k/uL    RBC 4.63 4.21 6099    diphenhydrAMINE (BENADRYL) 12.5 MG/5ML elixir 25 mg  25 mg Oral Q6H PRN Homer Ignacio MD   25 mg at 08/17/18 4101       ASSESSMENT:     Principal Problem:    Acute systolic congestive heart failure (HCC)  Active Problems:    Seizure disorder (HCC)    Loss of appetite    Acute on chronic congestive heart failure (HCC)  Resolved Problems:    * No resolved hospital problems. *      PLAN:     Acute CHF exacerbation  - ECHO - EF 10%, 0.9 x 0.5cm mobile thrombus in apex of LV   - On heparin drip and warfarin PO. INR still subtherapeutic  - Continue IV Lasix 20mgBID  - Cardiology following, rec f/u outpatient CHF clinic  - Cardiac diet, fluid restriction 1200ml, sodium restriction  - continue ASA 81mg  - Continue home Coreg and lisinopril     - Will likely D/C today with SQ Lovenox bridging until INR therapeutic. Will discuss with  plan and home needs  - Will transition to PO Lasix 20mgBID on DC. Pending cardio recs regarding home dose      Seizure Disorder, stable  - Continue home dose of Keppra  - Follow w/[facility] o/p (last visit 2017)    Hypokalemia - resolved  - KSS     Poor appetite  - Dietary following, rec Ensure Clear bid, father continuing to encourage PO intake         Above plan discussed with the patient and father in room, who agreed to the above plan     DVT Prophylaxis: On heparin drip   GI Prophylaxis: Not indicated     Plan will be discussed with the attending, Dr. Sanford Vallejo.      Ambrosio Rodriguez MD  Family Medicine Resident  8/18/2018 9:28 AM

## 2018-08-19 NOTE — DISCHARGE SUMMARY
Discharge Summary    NAME:  Samuel Garibay  :  1990  MRN:  [id number]    Admit date:  2018  Discharge date:  2018    Admitting Physician:  Gabriel Alex MD    Primary Diagnosis on Admission:   Present on Admission:   Seizure disorder ([facility] [address]   Acute systolic congestive heart failure (HCC)   Loss of appetite   Acute on chronic congestive heart failure ([facility] [address]      Secondary Diagnoses:   does not have any pertinent problems on file. Admission Condition:  poor     Discharged Condition: good    Hospital Course: The patient is a 33 y.o.  male with history of viral cardiomyopathy and systolic CHF, who presented with cough and sputum production. The patient has autism and is non-verbal, subsequently the HPI was obtained from father in the room and EMR. The patient was recently admitted in  to [facility] for septic PNA where he was treated with antibiotics. However while there, he had an ECHO that identified an EF of <10%. Cardiology was consulted and reccommended he be started on lasix and coreg. Unfortunately, he never got the Lasix and has only been taking the Coreg according to his father which ran out 2 weeks ago. Dad states his son never seemed to improve since his PNA admission and has precipitously gone \"down hill\" since then. He has had a cough with white sputum production since discharge in  and seems to have gotten worse over time. Dad denies chest pain, fevers, chills, or night sweats. The patient has been more nauseated and had 2 episodes of non-bloody, non-bilious emesis over the weekend. Dad says he has been a little more SOB than normal, \"he seems to be fighting for that breath more\".  He has had a significant decrease in appetite over the last week or two with a 10 lbs weight loss in the last week.      Previous admission in  he was found to have cardiomegaly and Cardiology was consulted.  He had an urgent ECHO under sedation with an EF<10%, possible apical thrombus in the atria, moderate MR and TR, and severe global hypokinesis. He was not started on [facility] [place] d/t being a high risk of bleeding if started. He was d/c on PO Levaquin, Metronidazole, Lasix, Coreg and told to f/u w/pulmonary and PCP.     In the ED BNP was 9,034, subsequently he received a dose of Lasix 20 mg. CXR shows mild-to-moderate CHF or PNA. He is admitted to the hospital for CHF exacerbation vs PNA. During his stay, cardiology was consulted. An ECHO was done and a thrombus was identified in the left ventricle. He was started on a heparin drip and then started on Coumadin while at the hospital. INR was therapeutic (INR 2) on d/c. Discussion with cardiologist the pt status. Discussed indications for AICD and role it may play in Reese's future with father. Patient was also found to be in protein calorie malnutrition. Dietician was consulted and appropriate diet was started. Consults:  Cardiology    Significant Diagnostic Studies: TTE showing LVEF @ 10%. Treatments (including Operations/Procedures):   Started on Warfarin ad to continue o/p w/coumadin clinic. Disposition:   D/c to home     Instructions to Patient: f/u with cardiology clinic. Please also follow up with PCP in 2 weeks. Follow up with Sophie Laurent MD in 2 weeks.     Discharge Medications:     [facility] Med[facility]ructions CTS:[id number]    Printed on:08/19/18 1335   Medication Information                      Acetaminophen (TYLENOL) 167 MG/5ML LIQD  Take 5 mLs by mouth every 6 hours as needed for Pain or Fever Take as             albuterol (PROVENTIL) (2.5 MG/3ML) 0.083% nebulizer solution  Take 3 mLs by nebulization every 6 hours as needed for Wheezing             carvedilol (COREG) 3.125 MG tablet  Take 3.125 mg by mouth 2 times daily (with meals)             cetirizine (ZYRTEC) 1 MG/ML SOLN syrup  Take 10 mLs by mouth daily             Dextromethorphan-guaiFENesin (MUCINEX FAST-MAX DM MAX) 5-100 MG/5ML LIQD  Take 5

## 2018-08-19 NOTE — PROGRESS NOTES
Writer spoke with cardiologist on call. Verbal order given to continue heparin gtt. Will continue to monitor.

## 2018-08-19 NOTE — PROGRESS NOTES
Patients father called out said it seemed like patient was struggling and acting different. Vitals were obtained. Writer and another RN listened to lung sounds, clear and diminished bilaterally. Resident on call notified. Will continue to monitor.

## 2018-08-19 NOTE — CARE COORDINATION
Care Transition     Primary team called to discuss coumadin and Lovenox. They are waiting to hear back if will need bridge from cardiology, INR is current 2.0. Goal was 2.5-3.5. Asked primary team if requires Lovenox to please send downstairs to see if requires a prior auth.   [address] for a cab and will have to refer to supervisor for cab. No cab service available. Lift cab service said will be here and in 5 mins Eneida Billings 6 is  make and model. [facility] called cab was a no call no show    [facility] ([facility]) Medicaid again and they will redispatch. [facility] called again no cab over 15 min    Called Medicaid for the 3rd time. Informed the [facility] dispatcher this was the 3rd request.      cancelled ride, escalation will redispatch ride. Vernon Koenig last 3 on plate is [phone].

## 2018-08-19 NOTE — PROGRESS NOTES
hypokinesis. He was not started on [facility] d/t being a high risk of bleeding if started. He was d/c on PO Levaquin, Metronidazole, Lasix, Coreg and told to f/u w/pulmonary and PCP.     In the ED BNP was 9,034, subsequently he received a dose of Lasix 20 mg. CXR shows mild-to-moderate CHF or PNA. He is admitted to the hospital for CHF exacerbation vs PNA. REVIEW OF SYSTEMS:      ROS limited obtained according to father. Patient has Autism and is non-verbal.     CONSTITUTIONAL:  no fevers  RESPIRATORY: few dry coughs yesterday  CARDIOVASCULAR: refusing telemetry   GASTROINTESTINAL: positive for constipation     PAST MEDICAL HISTORY:      has a past medical history of Acute systolic congestive heart failure ([address] Asthma; Development delay; and Seizures ([address] PAST SURGICAL HISTORY:      has a past surgical history that includes cyst incision and drainage. SOCIAL HISTORY:      reports that he has never smoked. He has never used smokeless tobacco. He reports that he does not drink alcohol or use drugs. FAMILY HISTORY:     family history includes Diabetes in his father; No Known Problems in his mother. HOME MEDICATIONS:      Prior to Admission medications    Medication Sig Start Date End Date Taking?  Authorizing Provider   Dextromethorphan-guaiFENesin ([address] FAST-MAX DM MAX) 5-100 MG/5ML LIQD Take 5 ml by mouth every 8 hours as needed for cough 8/10/18  Yes Fabricio Gordon MD   megestrol (MEGACE) 40 MG/ML suspension Take 6 mLs by mouth 2 times daily 7/30/18  Yes Fabricio Gordon MD   Dextromethorphan-guaiFENesin ([address] FAST-MAX DM MAX) 5-100 MG/5ML LIQD Take 5 mLs by mouth every 12 hours as needed (fever or pain) 7/9/18  Yes Fabricio Gordon MD   carvedilol (COREG) 3.125 MG tablet Take 3.125 mg by mouth 2 times daily (with meals)   Yes Historical Provider, MD   lisinopril (PRINIVIL;ZESTRIL) 2.5 MG tablet Take 2.5 mg by mouth daily   Yes Historical Provider, MD   cetirizine (ZYRTEC) 1 MG/ML SOLN syrup Take 10 mLs by mouth daily 6/11/18  Yes 164 Karla Small MD   guaiFENesin [facility]) 600 MG extended release tablet Take 1 tablet by mouth 2 times daily 5/24/18  Yes 164 Karla Small MD   albuterol (PROVENTIL) (2.5 MG/3ML) 0.083% nebulizer solution Take 3 mLs by nebulization every 6 hours as needed for Wheezing 5/24/18  Yes 164 Karla Small MD   levETIRAcetam (KEPPRA) 100 MG/ML solution Take 10 mg/kg by mouth 2 times daily   Yes Historical Provider, MD   Incontinence Supply Disposable ([address] [facility] [address]) MISC Use as directed 1/22/18  Yes 164 Karla Small MD   Acetaminophen (TYLENOL) 167 MG/5ML LIQD Take 5 mLs by mouth every 6 hours as needed for Pain or Fever Take as 7/9/18   164 Karla Small MD   amoxicillin-clavulanate (AUGMENTIN) 875-125 MG per tablet Take 1 tablet by mouth 2 times daily    Historical Provider, MD   aspirin 81 MG chewable tablet Take 81 mg by mouth daily    Historical Provider, MD       ALLERGIES:     Seasonal      OBJECTIVE:       Vitals:    08/18/18 1645 08/18/18 2050 08/19/18 0007 08/19/18 0600   BP: (!) 152/80 128/73 (!) 126/91    Pulse: 50 66 121    Resp:  17 20    Temp:  98.4 °F (36.9 °C) 97.8 °F (36.6 °C)    TempSrc:  Axillary Axillary    SpO2:  96% 92%    Weight:    128 lb 12.8 oz (58.4 kg)   Height:             Intake/Output Summary (Last 24 hours) at 08/19/18 0733  Last data filed at 08/18/18 1740   Gross per 24 hour   Intake             1345 ml   Output                0 ml   Net             1345 ml       PHYSICAL EXAM:  General Appearance  Alert , awake , not in acute distress, nonverbal  HEENT - Head is normocephalic, atraumatic. Lungs - Bilateral equal air entry , no wheezes, rales or rhonchi, aeration good  Cardiovascular - Heart sounds are normal.  Regular rhythm, normal rate without murmur, gallop or rub.   Abdomen - Soft feels full, nontender, nondistended  Skin - No bruising or bleeding on exposed skin area  Extremities - No cyanosis, clubbing or edema    DIAGNOSTICS: AM

## 2018-08-22 PROBLEM — I51.3 ATRIAL THROMBUS WITHOUT ANTECEDENT MYOCARDIAL INFARCTION: Status: ACTIVE | Noted: 2018-01-01

## 2018-08-22 NOTE — TELEPHONE ENCOUNTER
Please advise critical lab below.          8/22/2018  4:33 PM - Reyes [facility] Incoming Lab Results From [facility]     Component Results     Component Value Ref Range & Units Status Collected Lab   Protime 97.5   9.0 - 12.0 sec Final 08/22/2018  4:10  [address]   INR 10.6

## 2018-08-22 NOTE — PROGRESS NOTES
First visit to [facility] Office. Education provided on indication and mechanism of warfarin, compliance, appropriate follow-up & monitoring, dietary and medication interactions, potential thromboembolic & bleeding complications, when to seek medical care, and office policy. Patient states compliant with regimen. No bleeding or thromboembolic side effects noted. No significant med or dietary changes. No significant recent illness or disease state changes. PT/INR done in office per protocol. INR today on POC was greater than 8.0, supratherapeutic. Per P&P patient was escorted to outpatient lab for venous blood draw to double check INR. Since patient new to clinic and warfarin, ideal maintenance regimen has not yet been established. Result from venous blood draw, INR = 10.4. Spoke with Dr. Carlos Boss to discuss plan: Will HOLD warfarin x 2 days (today and tomorrow) and repeat INR on Friday 8/24/18. No active bleeding, vitamin K not recommended. Counseled patient's father on safety precautions associated with elevated INR. Patient with recent significant weight loss, currently on Megace to stimulate appetite but father reports use after one month does not seem to have made a difference. Based on potnetial interaction with Megace and warfarin, Megace counteracting warfarin would consider stopping Megace. Patient/ father understands dosing directions given. Written summary given to patient. Progress note faxed to office. Problem: Patient Care Overview  Goal: Plan of Care Review  Outcome: Ongoing (interventions implemented as appropriate)  Patient is AAOx4, VSS, room air, breath sounds clear. Pt is up with stand by assist to chair and commode. No acute events during shift. Patient denies any pain or discomfort, skin intact. Sacrum wound healing, no dressing needed. Patient able to shift weight independently. WCTM

## 2018-08-24 NOTE — PROGRESS NOTES
consult agreement with pharmacist as referred by his/her physician.         Electronically signed by Christos Mcelroy, [facility] on 8/24/18 at 9:45 AM

## 2018-08-27 NOTE — PROGRESS NOTES
Medication Management Service  [facility]  [phone]    Visit Date: 8/27/2018   Subjective:       Matthew Dixon is a 33 y.o. male who presents to clinic today for anticoagulation monitoring and adjustment. Patient seen in clinic for warfarin management due to  Indication:   atrial thrombus. INR goal: of 2.0-3.0. Duration of therapy: indefinite. Patient reports the following:   Adherent with regimen  Missed or extra doses:  None   Bleeding or thromboembolic side effects:  None  Significant medication, dietary, alcohol, or tobacco changes:  Still eating very little. Significant recent illness, disease state changes, or hospitalization: Patient's father reports SOB and lethargy. He is taking him to the ER for evaluation today. Upcoming surgeries or procedures:  None           Assessment and PLAN     PT/INR done in office per protocol. INR today is 4.6, supratherapeutic. Supra-therapetuic INR due to dosage being too high for patient. Plan: Will hold one more dose of warfarin today then resume therapy at a much lower regimen. New regimen will be warfarin  2.5 mg Monday, Wednesday, Friday and 1.25 mg all other days. Using warfarin 2.5 mg tablets. New RX called in for a different strength : 2.5 mg tablets   Recheck INR in one week(s). Patient seen in room # one. Patient verbalized understanding of dosing directions and information discussed. Dosing schedule given to patient. Progress note sent to referring office. Patient acknowledges working in consult agreement with pharmacist as referred by his/her physician.         Electronically signed by Andrez Faulkner RPH on 8/27/18 at 10:39 AM

## 2018-08-29 NOTE — DISCHARGE SUMMARY
Attending Physician Statement  I have discussed the case, including pertinent history and exam findings with the resident. I have seen and examined the patient and the key elements of the encounter have been performed by me. I agree with the assessment, plan and orders as documented by the resident.         BP (!) 141/109   Pulse 115   Temp 98.1 °F (36.7 °C) (Axillary)   Resp 22   Ht 5' 6\" (1.676 m)   Wt 126 lb 12.2 oz (57.5 kg)   SpO2 93%   BMI 20.46 kg/m²    BP Readings from Last 3 Encounters:   08/19/18 (!) 141/109   07/30/18 120/70   07/09/18 130/70     Wt Readings from Last 3 Encounters:   08/19/18 126 lb 12.2 oz (57.5 kg)   08/10/18 127 lb 6.4 oz (57.8 kg)   07/30/18 123 lb 9.6 oz (56.1 kg)       Priyanka Ryan DO 8/29/2018 2:47 PM

## 2018-09-11 NOTE — PROGRESS NOTES
Medication Management Service  [facility]  [phone]    Visit Date: 9/11/2018   Subjective:       Nikki Guerra is a 33 y.o. male who presents to clinic today for anticoagulation monitoring and adjustment. Patient seen in clinic for warfarin management due to  Indication:   Atrial thrombus. INR goal: of 2.0-3.0. Duration of therapy: indefinite. Patient reports the following:   Adherent with regimen  Missed or extra doses:  None   Bleeding or thromboembolic side effects:  None  Significant medication, dietary, alcohol, or tobacco changes:  None  Significant recent illness, disease state changes, or hospitalization:  None  Upcoming surgeries or procedures:  None           Assessment and PLAN     PT/INR done in office per protocol. INR today is 2.8, therapeutic. Plan: Will continue current regimen of warfarin 2.5 mg Wednesday, Saturday and 1.25 mg all other days. Using warfarin 2.5 mg tablets. Recheck INR in two week(s). Patient seen in room # one. Patient verbalized understanding of dosing directions and information discussed. Dosing schedule given to patient. Progress note sent to referring office. Patient acknowledges working in consult agreement with pharmacist as referred by his/her physician.         Electronically signed by Dana Torres RPH on 9/11/18 at 10:56 AM

## 2018-10-01 NOTE — PROGRESS NOTES
Visit Information    Have you changed or started any medications since your last visit including any over-the-counter medicines, vitamins, or herbal medicines? no   Have you stopped taking any of your medications? Is so, why? -  no  Are you having any side effects from any of your medications? - no    Have you seen any other physician or provider since your last visit?  no   Have you had any other diagnostic tests since your last visit?  no   Have you been seen in the emergency room and/or had an admission in a hospital since we last saw you?  no   Have you had your routine dental cleaning in the past 6 months?  no     Do you have an active MyChart account? If no, what is the barrier?   No: code     Patient Care Team:  Vimal Virgen MD as PCP - General (Family Medicine)    Medical History Review  Past Medical, Family, and Social History reviewed and does not contribute to the patient presenting condition    Health Maintenance   Topic Date Due    HIV screen  2006    Pneumococcal med risk (1 of 1 - PPSV23) 2010    Flu vaccine (1) 2018    Potassium monitoring  2019    Creatinine monitoring  2019    DTaP/Tdap/Td vaccine (2 - Td) 2028

## 2018-10-01 NOTE — PROGRESS NOTES
Attending Physician Statement  I have discussed the care of Shayan Chao, including pertinent history and exam findings,  with the resident. I have reviewed the key elements of all parts of the encounter with the resident. I agree with the assessment, plan and orders as documented by the resident. (Marilyn Lew) - Ulysses Carter M.D  Vitals:    10/01/18 1311   BP: 120/70   Pulse: 80   Temp: 97.1 °F (36.2 °C)     1. Decreased appetite    2. Chronic combined systolic and diastolic congestive heart failure ([address]    3.  Need for vaccination    refer to hem onc for weight loss and refer to nutritionist

## 2018-10-01 NOTE — PROGRESS NOTES
Subjective:    Yessy Liu is a 33 y.o. male with  has a past medical history of Acute systolic congestive heart failure ([address] Asthma; Development delay; and Seizures ([address] HPI   Patient presents for follow up for weight loss. He has lost 6 pounds since mid August. Previous therapies include Megace and supplemental Boost and Ensure protein shakes. Patient does not like the taste of either. He does drink fluids and prefers junk food, but continues to lose weight since being hospitalized a few months ago for pneumonia and CHF. Review of Systems   Constitutional: Positive for weight loss. Negative for chills, fever and malaise/fatigue. Eyes: Negative for blurred vision. Respiratory: Negative for cough, shortness of breath and wheezing. Cardiovascular: Negative for chest pain. Gastrointestinal: Negative for abdominal pain, constipation, diarrhea, nausea and vomiting. Neurological: Negative for dizziness, weakness and headaches. Objective:    /70 (Site: Left Upper Arm, Position: Sitting, Cuff Size: Medium Adult)   Pulse 80   Temp 97.1 °F (36.2 °C) (Oral)   Ht 5' 6\" (1.676 m)   Wt 120 lb (54.4 kg)   BMI 19.37 kg/m²    BP Readings from Last 3 Encounters:   10/01/18 120/70   08/19/18 (!) 141/109   07/30/18 120/70     Physical Exam   Constitutional: He is oriented to person, place, and time and well-developed, well-nourished, and in no distress. No distress. HENT:   Head: Normocephalic and atraumatic. Cardiovascular: Normal rate, regular rhythm, normal heart sounds and intact distal pulses. No murmur heard. Pulmonary/Chest: Effort normal and breath sounds normal. He has no wheezes. Musculoskeletal: He exhibits no edema. Lymphadenopathy:     He has no cervical adenopathy. Neurological: He is alert and oriented to person, place, and time. Skin: He is not diaphoretic. Nursing note and vitals reviewed.     BP Readings from Last 3 Encounters:   10/01/18 120/70   08/19/18 (!)

## 2018-10-16 NOTE — PROGRESS NOTES
Medication Management Service  [facility]  [phone]    Visit Date: 10/16/2018   Subjective:       Jose Alfredo Porras is a 33 y.o. male who presents to clinic today for anticoagulation monitoring and adjustment. Patient seen in clinic for warfarin management due to  Indication:   atrial thrombus. INR goal: of 2.0-3.0. Duration of therapy: indefinite. Patient reports the following:   Adherent with regimen  Missed or extra doses:  None   Bleeding or thromboembolic side effects:  None  Significant medication, dietary, alcohol, or tobacco changes:  None  Significant recent illness, disease state changes, or hospitalization:  None  Upcoming surgeries or procedures:  None           Assessment and PLAN     PT/INR done in office per protocol. INR today is 2.3, therapeutic. Patient had supratherapeutic INR at last visit, INR in goal range today after having only 1.25mg over this past week. He had not been eating, not even junk food but today father reports that he did start eating a small amount over the weekend and even yesterday. Plan: Will change regimen to 1.25mg daily which is a 22% reduction to current regimen. Will also have patient hold dose on Thursday. Will need to closely manage while patient is still not eating much. Using warfarin 2.5 mg tablets. Recheck INR in 6 days. Patient seen in room # 3. Patient verbalized understanding of dosing directions and information discussed. Dosing schedule given to patient. Progress note sent to referring office. Patient acknowledges working in consult agreement with pharmacist as referred by his/her physician.       Electronically signed by CHRISTAL Pedroza [facility] on 10/16/18 at 10:25 AM

## 2018-11-26 PROBLEM — I26.99 PULMONARY EMBOLISM ON LONG-TERM ANTICOAGULATION THERAPY (HCC): Status: ACTIVE | Noted: 2018-01-01

## 2018-11-26 PROBLEM — Z79.01 PULMONARY EMBOLISM ON LONG-TERM ANTICOAGULATION THERAPY (HCC): Status: ACTIVE | Noted: 2018-01-01

## 2018-11-26 PROBLEM — I50.22 CHRONIC SYSTOLIC CONGESTIVE HEART FAILURE (HCC): Status: ACTIVE | Noted: 2018-01-01

## 2018-11-26 PROBLEM — E46 PROTEIN-CALORIE MALNUTRITION (HCC): Status: ACTIVE | Noted: 2018-01-01

## 2018-11-26 NOTE — ED PROVIDER NOTES
Prothrombin Time: (!) 20.1 [RB]   1643 Cardiomegaly and/or pericardial effusion with mild CHF and left pleural  effusion which is predominantly subpulmonic  [RB]   1643 Levetiracetam: 39 [RB]   1813 Will consult for medical cardiology. Started patient on PE DVT dose of heparin drip.   [RB]      ED Course User Index  [RB] Ashia Rahman,           CONSULTS:   IP CONSULT TO FAMILY MEDICINE  IP CONSULT TO CARDIOLOGY  IP CONSULT TO CARDIOLOGY  IP CONSULT TO PULMONOLOGY  IP CONSULT TO HEM/ONC  IP CONSULT TO SOCIAL WORK  IP CONSULT TO HEART FAILURE NURSE/COORDINATOR  IP CONSULT TO DIETITIAN  IP CONSULT TO VASCULAR SURGERY  IP CONSULT TO CARDIOLOGY     PROCEDURES:  Procedures  None    MEDICATIONS:  Medications   sodium chloride flush 0.9 % injection 10 mL (10 mLs Intravenous Given 11/27/18 2036)   sodium chloride flush 0.9 % injection 10 mL (not administered)   albuterol (PROVENTIL) nebulizer solution 2.5 mg (not administered)   bisoprolol (ZEBETA) tablet 5 mg (5 mg Oral Not Given 11/27/18 1503)   levETIRAcetam (KEPPRA) 100 MG/ML solution 567 mg (567 mg Oral Given 11/27/18 2035)   lisinopril (PRINIVIL;ZESTRIL) tablet 5 mg (5 mg Oral Not Given 11/27/18 1503)   sodium chloride flush 0.9 % injection 10 mL (10 mLs Intravenous Not Given 11/27/18 2047)   sodium chloride flush 0.9 % injection 10 mL (not administered)   magnesium hydroxide (MILK OF MAGNESIA) 400 MG/5ML suspension 30 mL (not administered)   ondansetron (ZOFRAN) injection 4 mg (not administered)   famotidine (PEPCID) injection 20 mg (20 mg Intravenous Given 11/27/18 2035)   furosemide (LASIX) injection 20 mg (20 mg Intravenous Not Given 11/27/18 1504)   potassium chloride (KLOR-CON M) extended release tablet 40 mEq (not administered)     Or   potassium chloride 20 MEQ/15ML (10%) oral solution 40 mEq (not administered)     Or   potassium chloride 10 mEq/100 mL IVPB (Peripheral Line) (not administered)   heparin (porcine) injection 4,540 Units (not administered) heparin (porcine) injection 2,270 Units (not administered)   heparin 25,000 units in dextrose 5% 250 mL infusion (18 Units/kg/hr × 56.7 kg Intravenous New Bag 11/27/18 1146)   acetaminophen (TYLENOL) 160 MG/5ML solution 650 mg (650 mg Oral Given 11/27/18 2013)   iopamidol (ISOVUE-370) 76 % injection 75 mL (75 mLs Intravenous Given 11/26/18 1700)   heparin (porcine) injection 4,540 Units (4,540 Units Intravenous Given 11/26/18 1826)   heparin (porcine) injection 4,540 Units (4,540 Units Intravenous Given 11/27/18 1145)   acetaminophen (TYLENOL) 160 MG/5ML solution 650 mg (650 mg Oral Given 11/27/18 1024)         DIFFERENTIAL DIAGNOSIS/MDM:         Will Check CBC, BMP, Trop, Keppra level, UA with micro, CXR, CT PE, EKG. EKG poor quality will order another one but on initial it is unremarkable. Repeat EKG unremarkable. CT Chest and CT A/P. Patient experiencing congestive heart failure is worsening as well as the pulmonary embolism on right lingual lobe. Turning for acute PE is prior clots were determined to be in the left atrium. Patient is on anticoagulation and levels are therapeutic. Admission to in patient FM. Will call cardiologist and plan for admission. FINAL IMPRESSION      1.  Congestive heart failure, unspecified HF chronicity, unspecified heart failure type ([facility] [address]    2. Other acute pulmonary embolism without acute cor pulmonale ([facility] [address]            DISPOSITION/PLAN   DISPOSITION              PATIENT REFERRED TO:  Huseyin Mccauley, [address]  [phone]            DISCHARGE MEDICATIONS:  Current Discharge Medication List      START taking these medications    Details   RA FEVER REDUCER/PAIN RELIEVER 160 MG/5ML suspension take 5 milliliters by mouth every 6 hours if needed for pain or fever  Qty: 120 mL, Refills: 5             (Please note that portions of this note were completed with a voice recognition program.  Efforts were made to edit the dictations but

## 2018-11-26 NOTE — ED PROVIDER NOTES
documented findings and plan of care. Any areas of disagreement are noted on the chart. I was personally present for the key portions of any procedures. I have documented in the chart those procedures where I was not present during the key portions. I have personally reviewed all images and agree with the resident's interpretation. I have reviewed the emergency nurses triage note. I agree with the chief complaint, past medical history, past surgical history, allergies, medications, social and family history as documented unless otherwise noted below. Documentation of the HPI, Physical Exam and Medical Decision Making performed by medical students or scribes is based on my personal performance of the HPI, PE and MDM. For Phys Assistant/ Nurse Practitioner cases/documentation I have had a face to face evaluation of this patient and have completed at least one if not all key elements of the E/M (history, physical exam, and MDM). Additional findings are as noted. For APC cases I have personally evaluated and examined the patient in conjunction with the APC and agree with the treatment plan and disposition of the patient as recorded by the APC.     Erinn Patino MD  Attending Emergency  Physician       Juan Carlos Lott MD  11/26/18 2902

## 2018-11-26 NOTE — ED NOTES
Bed: 15  Expected date:   Expected time:   Means of arrival:   Comments:  EMELI [id number] [facility] [address], RN  11/26/18 8028

## 2018-11-26 NOTE — H&P
Psych - normal affect       DIAGNOSTICS:      Laboratory Testing:    Recent Results (from the past 24 hour(s))   POCT troponin    Collection Time: 11/26/18  3:20 PM   Result Value Ref Range    POC Troponin I 0.03 0.00 - 0.10 ng/mL    POC Troponin Interp       The Troponin-I (POC) results cannot be compared to the Troponin-T results.    CBC Auto Differential    Collection Time: 11/26/18  3:42 PM   Result Value Ref Range    WBC 7.2 3.5 - 11.3 k/uL    RBC 4.67 4.21 - 5.77 m/uL    Hemoglobin 14.5 13.0 - 17.0 g/dL    Hematocrit 43.3 40.7 - 50.3 %    MCV 92.7 82.6 - 102.9 fL    MCH 31.0 25.2 - 33.5 pg    MCHC 33.5 28.4 - 34.8 g/dL    RDW 19.2 (H) 11.8 - 14.4 %    Platelets 583 504 - 744 k/uL    MPV 10.3 8.1 - 13.5 fL    NRBC Automated 0.3 (H) 0.0 per 100 WBC    Differential Type NOT REPORTED     Seg Neutrophils 83 (H) 36 - 65 %    Lymphocytes 14 (L) 24 - 43 %    Monocytes 3 3 - 12 %    Eosinophils % 0 (L) 1 - 4 %    Basophils 0 0 - 2 %    Immature Granulocytes 0 0 %    Segs Absolute 5.94 1.50 - 8.10 k/uL    Absolute Lymph # 1.03 (L) 1.10 - 3.70 k/uL    Absolute Mono # 0.23 0.10 - 1.20 k/uL    Absolute Eos # <0.03 0.00 - 0.44 k/uL    Basophils # <0.03 0.00 - 0.20 k/uL    Absolute Immature Granulocyte <0.03 0.00 - 0.30 k/uL    WBC Morphology NOT REPORTED     RBC Morphology ANISOCYTOSIS PRESENT     Platelet Estimate NOT REPORTED    Basic Metabolic Panel    Collection Time: 11/26/18  3:42 PM   Result Value Ref Range    Glucose 111 (H) 70 - 99 mg/dL    BUN 15 6 - 20 mg/dL    CREATININE 0.68 (L) 0.70 - 1.20 mg/dL    Bun/Cre Ratio NOT REPORTED 9 - 20    Calcium 9.5 8.6 - 10.4 mg/dL    Sodium 139 135 - 144 mmol/L    Potassium 3.9 3.7 - 5.3 mmol/L    Chloride 98 98 - 107 mmol/L    CO2 17 (L) 20 - 31 mmol/L    Anion Gap 24 (H) 9 - 17 mmol/L    GFR Non-African American >60 >60 mL/min    GFR African American >60 >60 mL/min    GFR Comment          GFR Staging NOT REPORTED    Protime-INR    Collection Time: 11/26/18  3:42 PM   Result

## 2018-11-27 NOTE — ED PROVIDER NOTES
[facility] ED  Emergency Department  Emergency Medicine Resident Sign-out     Care of Celena Valles was assumed from Dr. Austin Coffey and is being seen for Loss of Consciousness (Pt brought to the ED by EMS with father at bedside. Father states that pt was coming to this facility today for an echo but states that pt passed out before they were able to make it in for the echo. Father states that pt has been having weakness for a couple of days now and that pt has lower extremity swelling. Father states that pt has been c/o headaches for the past 3 days, father has been giving pt aspirin for the pain) and Fatigue  . The patient's initial evaluation and plan have been discussed with the prior provider who initially evaluated the patient.      EMERGENCY DEPARTMENT COURSE / MEDICAL DECISION MAKING:       MEDICATIONS GIVEN:  Orders Placed This Encounter   Medications    iopamidol (ISOVUE-370) 76 % injection 75 mL    heparin (porcine) injection 4,540 Units    DISCONTD: heparin (porcine) injection 4,540 Units    DISCONTD: heparin (porcine) injection 2,270 Units    DISCONTD: heparin 25,000 units in dextrose 5% 250 mL infusion    enoxaparin (LOVENOX) injection 60 mg     LABS / RADIOLOGY:     Labs Reviewed   CBC WITH AUTO DIFFERENTIAL - Abnormal; Notable for the following:        Result Value    RDW 19.2 (*)     NRBC Automated 0.3 (*)     Seg Neutrophils 83 (*)     Lymphocytes 14 (*)     Eosinophils % 0 (*)     Absolute Lymph # 1.03 (*)     All other components within normal limits   BASIC METABOLIC PANEL - Abnormal; Notable for the following:     Glucose 111 (*)     CREATININE 0.68 (*)     CO2 17 (*)     Anion Gap 24 (*)     All other components within normal limits   PROTIME-INR - Abnormal; Notable for the following:     Protime 20.1 (*)     All other components within normal limits   BRAIN NATRIURETIC PEPTIDE - Abnormal; Notable for the following:     Pro-BNP 32,202 (*)     All other components within

## 2018-11-27 NOTE — PROGRESS NOTES
[facility]  Occupational Therapy Not Seen Note    DATE: 2018  Name: Brenda Brewer  : 1990  MRN: [id number]    Patient not available for Occupational Therapy due to:    [x] Testing:echo     [] Hemodialysis    [] Blood Transfusion in Progress    []Refusal by Patient:    [] Surgery/Procedure:    [] Strict Bedrest    [] Sedation    [] Spine Precautions     [] Pt being transferred to palliative care at this time. Spoke with pt/family and OT services to be defered. [] Pt independent with functional mobility and functional tasks.  Pt with no OT acute care needs at this time, will defer OT eval.    [] Other    Next Scheduled Treatment:check back 18    Signature: Romeo Cleaning OTKHARI/CHALO

## 2018-11-27 NOTE — CONSULTS
Division of Vascular Surgery        New Consult      Physician Requesting Consult: Mark Jerez MD    Reason for Consult:   Femoral artery clot    Chief Complaint:      Patient is non-verbal    History of Present Illness:      Paty Moody is a 33 y.o. gentleman who presents to the ER after slumping over and falling yesterday when he was in the elevator about to get an echocardiogram.  He has developmental delay and is non-verbal, he does express him self with making noises that his father is able to understand. He was found to have intraventricular clots in his heart at [address] in June and they had decided not to treat it with anticoagulation. He was recently admitted in August and Echo showed continued mobile thrombus and he was started on anticoagulation keeping his INR therapeutic between 2-3. He was scheduled for repeat echocardiogram yesterday to see if thrombus had resolved when he had suddenly collapsed in the elevator. His father says that the past several days he has been getting progressively weaker and been complaining of headaches. He also has had poor appetite for the past several months and his mobility has also significantly declined over the past several months. He does ambulate, but just around the house. His latest EF was around 10% in August, he has been treated with fiuretics for his shortness of breath. Workup with CTA to evaluate for PE as source of his shortness of breath revealed a pulmonary embolus in the lingular segment. Venous duplex was ordered which revealed incidental finding of thrombus in his common femoral, profunda and superficial femoral artery. His father has not noticed any changes in his legs or that he has been complaining of anything out of the ordinary in his legs.     Medical History:     Past Medical History:   Diagnosis Date    Acute systolic congestive heart failure ([address] 7/9/2018    Asthma     Development delay     Seizures (HCC)

## 2018-11-27 NOTE — ED PROVIDER NOTES
injection 20 mg    OR Linked Order Group     potassium chloride (KLOR-CON M) extended release tablet 40 mEq     potassium chloride 20 MEQ/15ML (10%) oral solution 40 mEq     potassium chloride 10 mEq/100 mL IVPB (Peripheral Line)    enoxaparin (LOVENOX) injection 60 mg       LABS / RADIOLOGY:     Labs Reviewed   CBC WITH AUTO DIFFERENTIAL - Abnormal; Notable for the following:        Result Value    RDW 19.2 (*)     NRBC Automated 0.3 (*)     Seg Neutrophils 83 (*)     Lymphocytes 14 (*)     Eosinophils % 0 (*)     Absolute Lymph # 1.03 (*)     All other components within normal limits   BASIC METABOLIC PANEL - Abnormal; Notable for the following:     Glucose 111 (*)     CREATININE 0.68 (*)     CO2 17 (*)     Anion Gap 24 (*)     All other components within normal limits   PROTIME-INR - Abnormal; Notable for the following:     Protime 20.1 (*)     All other components within normal limits   BRAIN NATRIURETIC PEPTIDE - Abnormal; Notable for the following:     Pro-BNP 32,202 (*)     All other components within normal limits   LEVETIRACETAM LEVEL   TROPONIN   URINALYSIS WITH MICROSCOPIC   BASIC METABOLIC PANEL W/ REFLEX TO MG FOR LOW K   CBC WITH AUTO DIFFERENTIAL   ANTITHROMBIN III ACTIVITY   FACTOR 5 LEIDEN   FACTOR 8 ASSAY   PROTEIN C FUNCTIONAL   PROTEIN S FUNCTIONAL   LUPUS ANTICOAGULANT   PROTHROMBIN GENE MUTATION   ANTI-PHOSPHOLIPID AB   PREALBUMIN   POCT TROPONIN   POCT TROPONIN   POCT TROPONIN   POCT TROPONIN       Ct Abdomen Pelvis Wo Contrast Additional Contrast? Radiologist Recommendation    Result Date: 11/26/2018  EXAMINATION: CT OF THE ABDOMEN AND PELVIS WITHOUT CONTRAST 11/26/2018 6:53 pm TECHNIQUE: CT of the abdomen and pelvis was performed without the administration of intravenous contrast. Multiplanar reformatted images are provided for review.  Dose modulation, iterative reconstruction, and/or weight based adjustment of the mA/kV was utilized to reduce the radiation dose to as low as reasonably

## 2018-11-27 NOTE — PROGRESS NOTES
[facility]  Progress Note    Date:   11/27/2018  Patient name:  Clayton Juarez  Date of admission:  11/26/2018  2:13 PM  MRN:   [id number]  YOB: 1990    SUBJECTIVE/Last 24 hours update:     Patient seen and examined at the bed side , no new acute events overnight, no new reported complaints. Patient presented yesterday after near-syncopal episode while on way with father for appointment to get ECHO. Patient found to have PE on CT chest. Doppler of LE was ordered and this AM found patient to have common femoral arterial occlusion. Upon return to [facility] in ED, doppler was used to identify femoral and popliteal pulses bilaterally. DP pulses could not be obtained. Stat consult to vascular surgery. Dr. Simon Baumgarten saw patient and recommended high dose heparin gtt, CT head, and ECHO. No planned surgical intervention at this time, but will continue to evaluate throughout the day as diagnostic testing is completed. Patient is non-verbal and resting in bed in no acute distress on examination. Father is at bedside. Notes from nursing staff and Consults had been reviewed, and the overnight progress had been checked with the nursing staff as well. HPI:   The patient is a 33 y.o.  male with history of Systolic HF (EF 56%), Developmental delay and Blood clots (atreal thrombus contolled with coumadin) who presented with pre syncope. History provided by father who is his primary care giver - Pt does not have ability to speak (MR). States he has had loss of appetite for past few weeks, refusing nutritional shakes, not improved with supplements. The father states they had appt for echo today, upon arrival at the hospital, pt became very weak and his father had to have him sit on the ground, did not lose consciousness or have seizure like activity. The father brought the pt to the ED.   In June, pt diagnosed with CHF (EF 10%), following with [facility] cardiology -

## 2018-11-27 NOTE — ED NOTES
Pt resting in bed with family at bedside. Pt alert, resp even and non labored. Denies any request at this time. Pt waiting on admit bed.       Yasir Mensah RN  11/26/18 4200

## 2018-11-27 NOTE — CONSULTS
5 milliliters by mouth every 6 hours if needed for pain or fever 11/27/18   Arpita Ferrell MD       Family History:   Family History   Problem Relation Age of Onset    No Known Problems Mother     Diabetes Father          Social History:   TOBACCO:   reports that he has never smoked. He has never used smokeless tobacco.  ETOH:   reports that he does not drink alcohol. DRUGS:  reports that he does not use drugs.   OCCUPATION:  Noncontributory          REVIEW OF SYSTEMS:  The information was obtained from the father and is as reported by him  Review of Systems -   General ROS: Completed and except as mentioned above were negative   Psychological ROS:  Completed and except as mentioned above were negative  Ophthalmic ROS:  Completed and except as mentioned above were negative  ENT ROS:  Completed and except as mentioned above were negative  Allergy and Immunology ROS:  Completed and except as mentioned above were negative  Hematological and Lymphatic ROS:  Completed and except as mentioned above were negative  Endocrine ROS: Completed and except as mentioned above were negative  Breast ROS:  Completed and except as mentioned above were negative  Respiratory ROS:  Completed and except as mentioned above were negative  Cardiovascular ROS:  Completed and except as mentioned above were negative  Gastrointestinal ROS: Completed and except as mentioned above were negative  Genito-Urinary ROS:  Completed and except as mentioned above were negative  Musculoskeletal ROS:  Completed and except as mentioned above were negative  Neurological ROS:  Completed and except as mentioned above were negative  Dermatological ROS:  Completed and except as mentioned above were negative          Physical Exam:    Vitals: /82   Pulse 105   Temp 97.8 °F (36.6 °C) (Axillary)   Resp 18   Ht 5' 6\" (1.676 m)   Wt 125 lb (56.7 kg)   SpO2 98%   BMI 20.18 kg/m²     Last Body weight:   Wt Readings from Last 3 Encounters:   11/26/18 125

## 2018-11-27 NOTE — ED NOTES
Patient sleeping on left side. No distress noted.  Respirations unlabored     Ul. Yoselin Colon, RN  11/27/18 5643

## 2018-11-28 NOTE — PROGRESS NOTES
Nutrition Assessment    Type and Reason for Visit: Initial, Positive Nutrition Screen (Weight loss, poor po intake)    Nutrition Recommendations: Resume oral diet as able. Encourage PO intake. Nutrition Assessment: Pt at nutritional risk d/t c/o decreased PO intake and weight loss per EHR. Pt currently NPO for OR today. Malnutrition Assessment:  · Malnutrition Status: Insufficient data  1. Weight Loss-5% loss or greater, in 3 months    Nutrition Risk Level: Moderate    Nutrient Needs:  · Estimated Daily Total Kcal: 4510-7631 kcals/day (28-30 kcal/kg)  · Estimated Daily Protein (g): 65-70 g/day (1.2-1.3 g/kg)    Nutrition Diagnosis:   · Problem: Unintended weight loss  · Etiology: related to Insufficient energy/nutrient consumption     Signs and symptoms:  as evidenced by  (>7% weight loss x 3 months)    Objective Information:  · Nutrition-Focused Physical Findings:    · Wound Type: Surgical Wound  · Current Nutrition Therapies:  · Oral Diet Orders: NPO   · Anthropometric Measures:  · Ht: 5' 6\" (167.6 cm)   · Current Body Wt: 117 lb 8.1 oz (53.3 kg)  · % Weight Change:  7.3% loss x 3 months per EHR  · Ideal Body Wt: 142 lb (64.4 kg), % Ideal Body 83%  · BMI Classification: BMI 18.5 - 24.9 Normal Weight    Nutrition Interventions:   Start oral diet (Dislikes Ensure Clear and Ensure Enlive ONS from previous admissions)  Continued Inpatient Monitoring, Education not appropriate at this time    Nutrition Evaluation:   · Evaluation: Goals set   · Goals: Meet greater than 50% of estimated nutrient needs with PO intake.     · Monitoring: Nutrition Progression, Weight, Pertinent Labs, I&O      Electronically signed by Rory Jaramillo, MS, RD, LD on 11/28/18 at 3:40 PM    Contact Number: [phone]

## 2018-11-28 NOTE — PROGRESS NOTES
Seen and examined at bedside with father. Resting comfortable, right leg warm to mid calf. Case discussed with father and grandmother on the phone. I suspect the thrombus he had in his heart went to his right femoral bifurcation. Because his INR was therapeutic he did not form clot around the embolus which would have made him much more ischemic. However it is limiting blood flow to his right leg and that is likely why when he would try to walk he had some issues with stability. I explained to them that removing the thrombus would help his blood flow to his leg especially when he tries to stand on it and walk. Will plan for exploration of his femoral artery and removal of embolus. He will be kept in the cardiovascular side of the hospital for postop. Continue heparin drip for now. Likely will need to continue anticoagulation to prevent occurrence of this again in the future given his poor heart function. All of his father's and grandmother's questions were answered and consent obtained to proceed for surgery.     Electronically signed by Lizabeth Garibay MD on 11/28/18 at 10:58 AM

## 2018-11-28 NOTE — PLAN OF CARE
Problem: Nutrition  Goal: Optimal nutrition therapy  Outcome: Ongoing  Nutrition Problem: Unintended weight loss  Intervention: Food and/or Nutrient Delivery: Start oral diet (Dislikes Ensure Clear and Ensure Enlive ONS from previous admissions)  Nutritional Goals: Meet greater than 50% of estimated nutrient needs with PO intake.

## 2018-11-28 NOTE — DISCHARGE INSTR - COC
Continuity of Care Form    Patient Name: Yessy Liu   :  1990  MRN:  [id number]    Admit date:  2018  Discharge date:  ***    Code Status Order: Full Code   Advance Directives:   Advance Care Flowsheet Documentation     Date/Time Healthcare Directive Type of Healthcare Directive Copy in [address] Agent's Name Healthcare Agent's Phone Number    [phone]  Yes, patient has an advance directive for healthcare treatment  Durable power of  for health care  No, copy requested from family  Patient's parents  Wilfredo Anne  [phone]          Admitting Physician:  Luis Miguel Curtis MD  PCP: Sampson Obregon MD    Discharging Nurse: [facility] Unit/Room#:   Discharging Unit Phone Number: ***    Emergency Contact:   Extended Emergency Contact Information  Primary Emergency Contact: [address] of [address] [place] [address] Phone: [phone]  Relation: Parent  Secondary Emergency Contact: [address] of [address] [place] [address] Phone: [phone]  Relation: Aunt/Uncle    Past Surgical History:  Past Surgical History:   Procedure Laterality Date    CYST INCISION AND DRAINAGE      MD REMV ART CLOT ILIAC-POP,LEG INCIS Right 2018    RIGHT FEMORAL EXPLORATION WITH RIGHT FEMORAL THROMBECTOMY performed by Lizabeth Garibay MD at [address]       Immunization History:   Immunization History   Administered Date(s) Administered    Influenza, [place] Adas, 6 mo and older, IM (Flulaval) 2018    Influenza, [place] Adas, 6-35 Months, IM (Fluzone) 10/01/2018    Pneumococcal Polysaccharide ([id number]) 10/01/2018    Tdap (Boostrix, Adacel) 2018       Active Problems:  Patient Active Problem List   Diagnosis Code    Seizure disorder ([facility] G40.909    Mental retardation S32    Acute systolic congestive heart failure (HCC) I50.21    Acute on chronic systolic CHF (congestive heart failure), NYHA class 4 (HCC) I50.23    Loss of intake/output data recorded. Safety Concerns:     [address] Safety Concerns:818215491}    Impairments/Disabilities:      [facility] Impairments/Disabilities:843016361}    Nutrition Therapy:  Current Nutrition Therapy:   [address] [employer] Diet List:645037930}    Routes of Feeding: {CHP DME Other Feedings:016510516}  Liquids: {Slp liquid thickness:38760}  Daily Fluid Restriction: {CHP DME Yes amt example:691758037}  Last Modified Barium Swallow with Video (Video Swallowing Test): {Done Not Done FPRA:803873900}    Treatments at the Time of Hospital Discharge:   Respiratory Treatments: ***  Oxygen Therapy:  {Therapy; copd oxygen:43195}  Ventilator:    { CC Vent RBCC:775538034}    Rehab Therapies: {THERAPEUTIC INTERVENTION:1457518030}  Weight Bearing Status/Restrictions: [address]  Weight Bearin}  Other Medical Equipment (for information only, NOT a DME order):  {EQUIPMENT:778135277}  Other Treatments: ***    Patient's personal belongings (please select all that are sent with patient):  {CHP DME Belongings:461306834}    RN SIGNATURE:  {Esignature:174017914}    CASE MANAGEMENT/SOCIAL WORK SECTION    Inpatient Status Date: ***    Readmission Risk Assessment Score:  Readmission Risk              Risk of Unplanned Readmission:        13           Discharging to Facility/ Agency   · Name:   · Address:  · Phone:  · Fax:    Dialysis Facility (if applicable)   · Name:  · Address:  · Dialysis Schedule:  · Phone:  · Fax:    / signature: {Esignature:768236426}    PHYSICIAN SECTION    Prognosis: Poor    Condition at Discharge: Very guarded    Rehab Potential (if transferring to Rehab): Guarded    Recommended Labs or Other Treatments After Discharge: Supportive care includes home IV hydration and tube feeding. Initiation of TPN today. Will need Home Nursing care to facilitate TPN until released to family for administration.  Electronically signed by Raudel Buckner DO on 2018 at 11:43

## 2018-11-28 NOTE — CONSULTS
never smoked. He has never used smokeless tobacco.  ETOH:   reports that he does not drink alcohol. OCCUPATION:      Family History:   Paternal grandfather with clotting disorder, unknown kind  Family History   Problem Relation Age of Onset    No Known Problems Mother     Diabetes Father      ROS:  Unable to assess patient is nonverbal     Physical Exam:    Vitals: /89   Pulse 99   Temp 97.7 °F (36.5 °C) (Temporal)   Resp 29   Ht 5' 6\" (1.676 m)   Wt 117 lb 8 oz (53.3 kg)   SpO2 100%   BMI 18.96 kg/m²   General appearance: nonverbal, development delay   HEENT: Head: Normocephalic,   Lungs: dimnished bilaterally  Heart:  S1, S2   Abdomen: soft, non-tender; bowel sounds normal; no masses,  no organomegaly  Extremities: b/l edema  Neurologic: Mental status: unable to assess    CBC:   Recent Labs      11/26/18   1542  11/27/18   0726  11/28/18   0557   WBC  7.2  5.9  5.3   HGB  14.5  14.9  13.9   PLT  303  363  320     BMP:    Recent Labs      11/26/18   1542  11/27/18   0726  11/28/18   0557   NA  139  139  139   K  3.9  4.4  3.7   CL  98  99  102   CO2  17*  22  19*   BUN  15  17  21*   CREATININE  0.68*  0.61*  0.75   GLUCOSE  111*  68*  78     Hepatic: No results for input(s): AST, ALT, ALB, BILITOT, ALKPHOS in the last 72 hours. Troponin:   Recent Labs      11/26/18   1520  11/26/18   1747   TROPONINI  0.03  0.05     ECHO:reviewed EF of 10%    Results for Modesta Amaya (MRN [id number]) as of 11/28/2018 11:03   Ref. Range 11/27/2018 07:27   Anticardiolipin IgA Latest Units: APU Pending   Factor VIII Activity Latest Ref Range: 50 - 150 % 379 (H)   AT-III Activity Latest Ref Range: 83 - 122 % 84   Dilute Viper Venom Time Latest Ref Range: NLUP  NEGATIVE for Lupu. ..    Lupus Anticoag Unknown NOT REPORTED   Protein C Activity Latest Ref Range: >80 % 26 (L)   Protein S Activity Latest Ref Range: 77 - 116 % 113   Prothrombin Time Latest Ref Range: 9.0 - 12.0 sec 24.0 (H)   INR Unknown 2.4   PTT Latest Ref

## 2018-11-28 NOTE — PLAN OF CARE
Problem: Falls - Risk of:  Goal: Will remain free from falls  Will remain free from falls   Outcome: Met This Shift  Patient's bed was set to the lowest height. Non-skid footwear on. Call light and side table were within reach. Patient remained free from falls this shift.     Electronically signed by Santana Redd RN on 11/28/2018 at 2:50 AM

## 2018-11-28 NOTE — FLOWSHEET NOTE
Assessment: Visit initiated as a pre-surgery visit. Pt asleep in bed. Father at bedside. Intervention:   explained that visit was for pre-surgery check, and father offered to wake son, but  insisted that pt's sleep was far more important; offering that a  could visit in the morning.  talked with pt's father about his concerns for pt, and the emotional impact it has had as a parent. Father states that it is a relief that surgery will be done and he hopes for relief for his son. Father states that family is Mormonism.  Pt has many step-siblings and a sister. Father got phone call and  excused herself. Outcomes:  Pt sleeping so pre-surgery visit not completed. Father grateful for visit. Recommendations:  Pt referred for pre-surgery visit in morning. Chaplains will remain available to offer spiritual and emotional support as needed. 11/27/18 4280   Encounter Summary   Services provided to: Family  (father)   Referral/Consult From: (surgery list)   Support System Parent   Place of Hindu none   Continue Visiting (11/27)   Complexity of Encounter Moderate   Length of Encounter 30 minutes   Routine   Type Pre-procedure   Assessment Anxious; Hopeful   Intervention Explored feelings, thoughts, concerns;Explored coping resources; Discussed belief system/Baptism practices/chaka;Discussed illness/injury and it's impact;Sustaining presence/ Ministry of presence   Outcome Expressed gratitude;Receptive;Engaged in conversation

## 2018-11-28 NOTE — PROGRESS NOTES
Called physician at [facility] regarding pt's ekg. No answer, will be addressed by day shift.  Electronically signed by Robby Barron RN on 11/28/2018 at 7:07 AM

## 2018-11-28 NOTE — ANESTHESIA PRE PROCEDURE
[id number]     Type & Screen (If Applicable):  No results found for: LABABO, [address]    Anesthesia Evaluation  Patient summary reviewed no history of anesthetic complications:   Airway: Mallampati: II  TM distance: >3 FB   Neck ROM: full  Mouth opening: > = 3 FB Dental:          Pulmonary:normal exam    (+) asthma: seasonal asthma,                            Cardiovascular:    (+) CHF: systolic and diastolic,       ECG reviewed  Rhythm: regular  Rate: normal  Echocardiogram reviewed                  Neuro/Psych:   (+) psychiatric history: stable with treatment            GI/Hepatic/Renal: Neg GI/Hepatic/Renal ROS            Endo/Other: Negative Endo/Other ROS                    Abdominal:           Vascular: negative vascular ROS. Anesthesia Plan      MAC and general     ASA 4       Induction: intravenous. Anesthetic plan and risks discussed with patient. Plan discussed with CRNA.                   Zo Duke MD   11/28/2018

## 2018-11-28 NOTE — ANESTHESIA POSTPROCEDURE EVALUATION
Department of Anesthesiology  Postprocedure Note    Patient: Shayan Chao  MRN: [id number]  YOB: 1990  Date of evaluation: 11/28/2018  Time:  1:52 PM     Procedure Summary     Date:  11/28/18 Room / Location:  [address]    Anesthesia Start:  1128 Anesthesia Stop:  0392    Procedure:  RIGHT FEMORAL EXPLORATION WITH RIGHT FEMORAL THROMBECTOMY (Right ) Diagnosis:  (RIGHT FEMORAL ARTERY CLOT )    Surgeon:  Glenda Waller MD Responsible Provider:  Trisha Mohr MD    Anesthesia Type:  MAC, general ASA Status:  4          Anesthesia Type: MAC, general    Carmita Phase I:      Carmita Phase II:      Last vitals: Reviewed and per EMR flowsheets.        Anesthesia Post Evaluation    Patient location during evaluation: ICU  Patient participation: complete - patient participated  Level of consciousness: awake and alert  Pain score: 0  Airway patency: patent  Nausea & Vomiting: no vomiting and no nausea  Complications: no  Cardiovascular status: hemodynamically stable  Respiratory status: acceptable  Hydration status: stable

## 2018-11-29 NOTE — PROGRESS NOTES
solution 650 mg  650 mg Oral Q4H PRN Cheri Raya MD   650 mg at 11/27/18 2013       ASSESSMENT:     Principal Problem:    Pulmonary embolism on long-term anticoagulation therapy [facility])  Active Problems:    Seizure disorder ([facility] [address]    Mental retardation    Atrial thrombus without antecedent myocardial infarction    Chronic systolic congestive heart failure ([facility] [address]    Protein-calorie malnutrition ([facility] [address]    Cong[facility] ([facility]  Resolved Problems:    * No resolved hospital problems. *      PLAN:     R common femoral artery occlusion- resolved  - Femoral thrombectomy yesterday, pt tolerated well  - Continue on heparin gtt and restarted on home dose warfarin  - Possible initiation of NOAC depending on Cardiology recs  - ECHO showed LVEF 10%, no identified thrombus  - Will need to follow with Vascular in 1 month outpatient     Pulmonary Embolism- unlikely per Pulm   - Heparin gtt, restarted on warfarin  - F/u hypercoagulable workup  - Recs per pulm and heme/onc     Severe systolic CHF  - Cardiology recs pending  - Bisoprolol 5 mg daily  - Lisinopril 5 daily  - Monitor I/O and daily weights   - ECHO showed LVEF 10%, no identified thrombus    Seizure disorder   - Keppra 567 mg BID     Malnutrition   - Prealbumin 13.3  - Dietitian consulted     Disposition  - PT/OT  - Social work for discharge planning    Discussed care plan with nurse after getting her input.   Plan will be discussed with the attending, Dr. Dave Sloan MD  Family Medicine Resident  11/29/2018 10:36 AM

## 2018-11-29 NOTE — PROGRESS NOTES
Physical Therapy    Facility/Department: [facility]  Initial Assessment    NAME: Negrita Hunt  : 1990  MRN: [id number]    Date of Service: 2018  Chief Complaint   Patient presents with    Loss of Consciousness     Pt brought to the ED by EMS with father at bedside. Father states that pt was coming to this facility today for an echo but states that pt passed out before they were able to make it in for the echo. Father states that pt has been having weakness for a couple of days now and that pt has lower extremity swelling. Father states that pt has been c/o headaches for the past 3 days, father has been giving pt aspirin for the pain    Fatigue       Discharge Recommendations:  24 hour supervision or assist   PT Equipment Recommendations  Equipment Needed:  (TBD but likely none)    Patient Diagnosis(es): The primary encounter diagnosis was Congestive heart failure, unspecified HF chronicity, unspecified heart failure type ([address] A diagnosis of Other acute pulmonary embolism without acute cor pulmonale (HCC) was also pertinent to this visit. has a past medical history of Acute systolic congestive heart failure ([address] Asthma; Development delay; and Seizures ([address] has a past surgical history that includes cyst incision and drainage and pr remv art clot iliac-pop,leg incis (Right, 2018). Restrictions  Restrictions/Precautions  Restrictions/Precautions: General Precautions, Fall Risk  Required Braces or Orthoses?: No  Position Activity Restriction  Other position/activity restrictions: Ambulate pt is post surgery activity order  Vision/Hearing  Vision: Within Functional Limits  Hearing: Within functional limits     Subjective  General  Patient assessed for rehabilitation services?: Yes  Response To Previous Treatment: Not applicable  Family / Caregiver Present: Yes (father)  Follows Commands: Within Functional Limits  Subjective  Subjective: Pt supine in bed with father present.  RN agreeable to therapy this moring. Father agreeable to therapy this morning. Pt is non verbal.   Pain Screening  Patient Currently in Pain: Yes  Pain Assessment  Pain Assessment: Faces  Brown-Baker Pain Rating: Hurts a little bit  Pain Type: Surgical pain  Pain Location:  (Unable to provide location of pain but noted grimacing upon stanidng)  Pain Intervention(s): Repositioned; Ambulation/Increased activity; Distraction  Response to Pain Intervention: Patient Satisfied  Vital Signs  Patient Currently in Pain: Yes       Orientation  Orientation  Overall Orientation Status: Impaired  Orientation Level: Unable to assess (Pt is non verbal)  Social/Functional History  Social/Functional History  Lives With: Family (Lives with his father)  Type of Home: House  Home Layout: One level  Home Access: Stairs to enter without rails  Entrance Stairs - Number of Steps: 2  Bathroom Shower/Tub: Tub/Shower unit  Bathroom Toilet: Standard  Bathroom Equipment:  (NOne)  Home Equipment:  (No DME)  Receives Help From: Family, Home health (Aide services 2x per week and a nurse as well.  Aide typically assists with household tasks)  ADL Assistance: Needs assistance (Father assists pt with ADLs)  Homemaking Assistance: Needs assistance  Homemaking Responsibilities: No (Father and aide perform)  Ambulation Assistance: Independent (Per father, pt able to ambulate around house without physical assist)  Transfer Assistance: Independent  Active : No  Patient's  Info: Dad states they don't have a car  Mode of Transportation: Cab, Family, Friends  Type of occupation: Pt's father reports he spends the day at home with him now as pt finished school last year  Leisure & Hobbies: Likes music and his playstation  Additional Comments: Above information was obtained from pt's father as pt is non verbal.     Objective          AROM RLE (degrees)  RLE AROM: WFL  AROM LLE (degrees)  LLE AROM : WFL  AROM RUE (degrees)  RUE AROM : WFL  AROM LUE (degrees)  LUE

## 2018-11-29 NOTE — OP NOTE
[facility]                                OPERATIVE REPORT    PATIENT NAME: Rolanda Scott                         :        1990  MED REC NO:   [id number]                             ROOM:       1006  ACCOUNT NO:   [de-identified]                           ADMIT DATE: 2018  PROVIDER:     Lidya Liang MD    DATE OF PROCEDURE:  2018    PREOPERATIVE DIAGNOSIS:  Right femoral artery embolus. POSTOPERATIVE DIAGNOSIS:  Right femoral artery embolus. PROCEDURES:  1. Right femoral artery exploration. 2.  Embolectomy/thrombectomy of superficial femoral, profunda, and  common femoral artery. ANESTHESIA:  Local with monitored anesthesia care. SURGEON:  Lidya Liang MD    ESTIMATED BLOOD LOSS:  30 mL. COMPLICATIONS:  None. FINDINGS:  Chronic-appearing embolus with some acute clot in the right  femoral bifurcation. Please see brief operative note for images. INDICATIONS:  The patient is a 27-year-old, autistic, nonverbal  gentleman who had presented the other day with overall weakness,  fatigue, and imbalance in his right leg. He has had a history of  intraventricular thrombus that was seen on echocardiogram in August.  He  was here to get a repeat study when he fell. Workup led with CT of a  chest to rule out a PE for worsening shortness of breath as well as an  echocardiogram and venous duplex. The venous duplex showed no evidence  of DVT; however, there was thrombus seen in the right femoral  bifurcation, and his echocardiogram did not show anymore  intraventricular thrombus. On exam, he was not frankly ischemic. He  was on anticoagulation with therapeutic INR of just over 2. He  was admitted and started on a heparin drip and then recommendations made  for right femoral exploration and removal of the thrombus.   My concern  was he is not symptomatic at rest, but when he gets up

## 2018-11-29 NOTE — PROGRESS NOTES
Maximum/Moderate Assist  3. A Little = Minimum/Contact Guard Assist/Supervision  4. None= Modified [place]/Independent    Raw Score Scale Score Scale Score Standard Error Approximate Degree of Functional Impairment     6 17.07 3.74 100%   7 20.13 3.68 92%   8 22.86 3.43 86%   9 25.33 3.17 80%   10 27.31 2.96 75%   11 29.04 2.79 70%   12 30.60 2.68 67%   13 32.03 2.62 63%   14 33.39 2.61 60%   15 34.69 2.65 56%   16 35.96 2.71 53%   17 37.26 2.82 50%   18 38.66 2.97 47%   19 40.22 3.20 43%   20 42.03 3.55 38%   21 44.27 4.08 33%   22 47.10 4.81 26%   23 51.12 5.88 16%   24 57.54 7.36 0%     Goals  Short term goals  Time Frame for Short term goals: Pt discharged from acute care OT. Pt's father in room reports he assists pt in all ADL activities except self feeding. Pt at baseline functional level and demo no further acute care OT needs at this time        Therapy Time   Individual Concurrent Group Co-treatment   Time In 0925         Time Out 0945         Minutes 21          See above for LOF, RN reports patient is medically stable for therapy treatment this date. Chart reviewed prior to treatment and patient is agreeable for therapy. All lines intact and patient positioned comfortably at end of treatment. All patient needs addressed prior to ending therapy session.          Co-evaluation with PT  Cassie Cm OTR/L

## 2018-11-29 NOTE — PROGRESS NOTES
APTT:   Recent Labs      11/28/18   2124  11/29/18   0631  11/29/18   1224   APTT  >120.0*  40.1*  79.8*     MAG: No results for input(s): MG in the last 72 hours. D Dimer: No results for input(s): DDIMER in the last 72 hours. Troponin T No results for input(s): TROPONINT in the last 72 hours. ProBNP Invalid input(s): PRO-BNP          Diagnosis:  Principal Problem:    Pulmonary embolism on long-term anticoagulation therapy (HCC)  Active Problems:    Seizure disorder ([address]    Mental retardation    Atrial thrombus without antecedent myocardial infarction    Chronic systolic congestive heart failure (HCC)    Protein-calorie malnutrition (HCC)    Congestive heart failure (HCC)  Resolved Problems:    * No resolved hospital problems. *          Plan:    1. NICMP     - continue medical therapy     - being evaluated for AICD by EP service    2. Prior LV apical thrombus     - was on Coumadin    3. Acute leg ischemia from clot embolus     - s/p surgical resection by Vascular surgery     - on Hep gtt     - will start eliquis in place of Coumadin    After discussing at bedside with patient father and vascular surgery we will switch OAC to Eliquis from Coumadin. Nothing further to offer from a CMP standpoint at thsi time. Will try and have EP stop by and see patient tomorrow when they are over here - as per dad he was being set up for AICD soon.     Electronically signed by Jude Perkins DO on 11/29/2018 at 4:25 PM

## 2018-11-30 NOTE — CONSULTS
Consult - General Surgery    PATIENT NAME: Brigitte Patten  AGE: 33 y.o. MEDICAL RECORD NO. [id number]  DATE: 11/30/2018  SURGEON: KENA  PRIMARY CARE PHYSICIAN: Cheyanne Harman MD    Patient evaluated at the request of  Dr. Joy Stein  Reason for evaluation: PEG tube placement    Patient information was obtained from relative(s). History/Exam limitations: mental status and mental illness. IMPRESSION:   1. 33 y.o. male with protein-calorie malnutrition due to poor PO intake that will necessitate PEG tube placement     MEDICAL DECISION MAKING AND PLAN:   1. Continue medical management and support per primary team  2. Discuss placement of PEG tube as inpatient vs. Outpatient and possible placement of NGT as temporary means for nutrition       HISTORY:   History of Chief Complaint:    Brigitte Patten is a 33 y.o. male with PMHx MR, asthma, CHF with LVeF 10%, and atrial thrombus previously on Coumadin who initially presented with LOC and fatigue. Patient was found to have a R femoral DVT and PE and received therapeutic IV Heparin which he has completed. He has since been started on Eliquis. General surgery was consulted due to poor PO intake and protein calorie malnutrition after evaluation by dietician. Patient has not had NGT for TF during this visit as of current. Patient has been encouraged to drink Ensures without much success. Patient is non-verbal and brother present at bedside who states that family wishes to move forward with PEG placement for nutrition. Past Medical History   has a past medical history of Acute systolic congestive heart failure ([address] Asthma; Development delay; and Seizures ([address] Past Surgical History   has a past surgical history that includes cyst incision and drainage and pr remv art clot iliac-pop,leg incis (Right, 11/28/2018). Medications  Prior to Admission medications    Medication Sig Start Date End Date Taking?  Authorizing Provider   warfarin (COUMADIN) 2.5 MG tablet take 1/2 !Yes       !Yes            ! None      ! +------------------------------------+----------+---------------+----------+ ! GSV Ankle                           ! Yes       ! Yes            ! None      ! +------------------------------------+----------+---------------+----------+ ! SSV                                 ! Yes       ! Yes            ! None      ! +------------------------------------+----------+---------------+----------+ Left Doppler Measurements +---------------------------+------+------+--------------------------------+ ! Location                   ! Signal!Reflux! Reflux (msec)                   ! +---------------------------+------+------+--------------------------------+ ! Common Femoral             !Phasic!      !                                ! +---------------------------+------+------+--------------------------------+ ! Prox Femoral               !Phasic!      !                                ! +---------------------------+------+------+--------------------------------+ ! Popliteal                  !Phasic!      !                                ! +---------------------------+------+------+--------------------------------+    Thank you for the interesting evaluation. Further recommendations to follow. Ivory Blanco DO  11/30/18, 5:05 PM         Attending Note    Discharge instructions have been generated for the patient to follow up with Dr Luciano Barbour as soon as possible after discharge to evaluate patient for PEG placement to assist with supplemental caloric intake, for presumed protein calorie malnutrition. Patient is taking oral supplements currently. Patient has CHF with EF 10% and is on eliquis for atrial thrombus It is unclear form the chart if nasogastric feeds have been attempted which would alleviate need to hold anticoagulation for several days before a surgical procedure.   I have reviewed the above TECSS note(s) and I either performed the key elements of the medical history and physical exam or was present with the resident when the key elements of the medical history and physical exam were performed. I have discussed the findings, established the care plan and recommendations with Resident, DENIS RN, bedside nurse.     Damaris Gtz MD  11/30/2018  11:08 PM

## 2018-11-30 NOTE — PROGRESS NOTES
Patient admitted 11/26 for syncope, found to have femoral DVT, LV thrombus, and pulmonary embolism. Initially anticoagulated with heparin, now on eliquis for lifelong anticoagulation. Consulted for malnutrition, decreased PO intake. Hypoalbuminemic to 3.0, prealbumin 13. We recommend placement of flexiflo NGT and nocturnal feedings as recommended by dietician, with follow up next week with Dr. Nathanael Nevarez to discuss long-term solutions.     Shlomo Bolanos MD  General Surgery  PGY2

## 2018-11-30 NOTE — CONSULTS
Systems   Constitutional: Negative for chills and fatigue. Respiratory: Negative for chest tightness and shortness of breath. Cardiovascular: Negative for chest pain. Genitourinary: Negative. Psychiatric/Behavioral: Negative for agitation and behavioral problems. Physical Exam:    Vitals: /88   Pulse 101   Temp 97.5 °F (36.4 °C) (Axillary)   Resp 20   Ht 5' 6\" (1.676 m)   Wt 120 lb 4.8 oz (54.6 kg)   SpO2 100%   BMI 19.42 kg/m²     Last Body weight:   Wt Readings from Last 3 Encounters:   11/30/18 120 lb 4.8 oz (54.6 kg)   10/01/18 120 lb (54.4 kg)   08/19/18 126 lb 12.2 oz (57.5 kg)       Body Mass Index : Body mass index is 19.42 kg/m². Intake and Output summary:     Intake/Output Summary (Last 24 hours) at 11/30/18 1618  Last data filed at 11/30/18 0600   Gross per 24 hour   Intake            241.8 ml   Output                0 ml   Net            241.8 ml       Physical Examination:   PHYSICAL EXAMINATION:  Vitals:    11/29/18 1600 11/30/18 0600 11/30/18 0702 11/30/18 1306   BP:   108/81 114/88   Pulse: 114  90 101   Resp: 20  27 20   Temp: 97.2 °F (36.2 °C)  97.5 °F (36.4 °C) 97.5 °F (36.4 °C)   TempSrc: Axillary  Axillary Axillary   SpO2: 100%  98% 100%   Weight:  120 lb 4.8 oz (54.6 kg)     Height:  5' 6\" (1.676 m)       Constitutional: This is a well developed, well nourished, 18.5-24.9 - Normal 33y.o. year old male who is alert,cooperative and in no apparent distress. Head:normocephalic and atraumatic. EENT:   KOKI. No conjunctival injections. Septum was midline, mucosa was without erythema, exudates or cobblestoning. No thrush was noted. Mallampati II (soft palate, uvula, fauces visible)  Neck: Supple without thyromegaly. No elevated JVP. Trachea was midline. Respiratory: Chest was symmetrical without dullness to percussion. Breath sounds bilaterally were clear to auscultation. There were no wheezes, rhonchi or rales.  There is no intercostal retraction

## 2018-11-30 NOTE — PROGRESS NOTES
! +---------------------------+------+------+--------------------------------+ ! Common Femoral             !Phasic!      !                                ! +---------------------------+------+------+--------------------------------+ ! Prox Femoral               !Phasic!      !                                ! +---------------------------+------+------+--------------------------------+ ! Popliteal                  !Phasic!      !                                ! +---------------------------+------+------+--------------------------------+ Left Lower Extremities DVT Study Measurements Left 2D Measurements +------------------------------------+----------+---------------+----------+ ! Location                            ! Visualized! Compressibility! Thrombosis! +------------------------------------+----------+---------------+----------+ ! Common Femoral                      !Yes       ! Yes            ! None      ! +------------------------------------+----------+---------------+----------+ ! Prox Femoral                        !Yes       ! Yes            ! None      ! +------------------------------------+----------+---------------+----------+ ! Mid Femoral                         !Yes       ! Yes            ! None      ! +------------------------------------+----------+---------------+----------+ ! Dist Femoral                        !Yes       ! Yes            ! None      ! +------------------------------------+----------+---------------+----------+ ! Popliteal                           !Yes       ! Yes            ! None      ! +------------------------------------+----------+---------------+----------+ ! Sapheno Femoral Junction            ! Yes       ! Yes            ! None      ! +------------------------------------+----------+---------------+----------+ ! PTV                                 ! Yes       ! Yes            ! None      ! +------------------------------------+----------+---------------+----------+ ! Nathanael 400 mg  400 mg Oral Q6H PRN Juan Freire MD        sodium chloride flush 0.9 % injection 10 mL  10 mL Intravenous 2 times per day Loretta Campos MD   10 mL at 11/29/18 0913    sodium chloride flush 0.9 % injection 10 mL  10 mL Intravenous PRN Loretta Campos MD        albuterol (PROVENTIL) nebulizer solution 2.5 mg  2.5 mg Nebulization Q6H PRN Issac Ambriz MD        levETIRAcetam (KEPPRA) 100 MG/ML solution 567 mg  10 mg/kg Oral BID Issac Ambriz MD   567 mg at 11/29/18 2206    lisinopril (PRINIVIL;ZESTRIL) tablet 5 mg  5 mg Oral Daily Issac Ambriz MD   5 mg at 11/29/18 1013    sodium chloride flush 0.9 % injection 10 mL  10 mL Intravenous 2 times per day Issac Ambriz MD   10 mL at 11/29/18 0914    sodium chloride flush 0.9 % injection 10 mL  10 mL Intravenous PRN Issac Ambriz MD        magnesium hydroxide (MILK OF MAGNESIA) 400 MG/5ML suspension 30 mL  30 mL Oral Daily PRN Issac Ambriz MD        ondansetron (ZOFRAN) injection 4 mg  4 mg Intravenous Q6H PRN Issac Ambriz MD   4 mg at 11/29/18 0128    famotidine (PEPCID) injection 20 mg  20 mg Intravenous BID Issac Ambriz MD   20 mg at 11/29/18 9103    furosemide (LASIX) injection 20 mg  20 mg Intravenous Once Issac Ambriz MD        potassium chloride (KLOR-CON M) extended release tablet 40 mEq  40 mEq Oral PRN Issac Ambriz MD        Or    potassium chloride 20 MEQ/15ML (10%) oral solution 40 mEq  40 mEq Oral PRN Issac Ambriz MD        Or    potassium chloride 10 mEq/100 mL IVPB (Peripheral Line)  10 mEq Intravenous PRN Issac Ambriz MD        heparin (porcine) injection 4,540 Units  80 Units/kg Intravenous PRN Eva Muniz MD        heparin (porcine) injection 2,270 Units  40 Units/kg Intravenous PRN Eva Muniz MD   2,270 Units at 11/29/18 0917    acetaminophen (TYLENOL) 160 MG/5ML solution 650 mg  650 mg Oral Q4H PRN Issac Ambriz MD   650 mg at 11/30/18 0217       ASSESSMENT: Principal Problem:    Pulmonary embolism on long-term anticoagulation therapy ([facility]  Active Problems:    Seizure disorder ([facility]    Mental retardation    Atrial thrombus without antecedent myocardial infarction    Chronic systolic congestive heart failure ([facility])    Protein-calorie malnutrition ([facility])    Congestive heart failure ([facility]  Resolved Problems:    * No resolved hospital problems. *      PLAN:     R common femoral artery occlusion s/p femoral a thrombectomy - resolved  - Eliquis 5mg BID, coumadin discontinued  - Will need to follow with Vascular in 1 month outpatient      Severe systolic CHF  - Recs per Cardiology  - Bisoprolol 2.5 mg daily  - Lisinopril 5 daily  - EP will see patient today as there was discussion about AICD placement prior to his current admission     Seizure disorder   - Keppra 567 mg BID     Disposition  - PT/OT  - Social work for discharge planning      Discussed care plan with nurse after getting his/her input. Above plan discussed with the patient, who agreed to the above plan.   Plan will be discussed with the attending, Dr. Makeda Hoyos MD  Family Medicine Resident  11/30/2018 9:26 AM

## 2018-11-30 NOTE — PROGRESS NOTES
[address] [place] [address] [facility])    Progress Note    2018 3:00 PM      Subjective:  Mr. Vannesa Lucio  Is laying in bed           LABS:     CBC: Recent Labs      18   0557   WBC  5.3   HGB  13.9   HCT  42.5   MCV  94.9   PLT  320     BMP: Recent Labs      18   0557   NA  139   K  3.7   CL  102   CO2  19*   BUN  21*   CREATININE  0.75     PT/INR:   Recent Labs      18   0631   PROTIME  19.4*   INR  1.9     APTT:   Recent Labs      18   2124  18   0631  18   1224   APTT  >120.0*  40.1*  79.8*     MAG: No results for input(s): MG in the last 72 hours. D Dimer: No results for input(s): DDIMER in the last 72 hours. Troponin T No results for input(s): TROPONINT in the last 72 hours. Pro-BNP No results for input(s): BNP in the last 72 hours. Pulse Ox:  SpO2  Av.3 %  Min: 98 %  Max: 100 %  Supplemental O2: O2 Flow Rate (L/min): 2 L/min     Current Meds:   ibuprofen (ADVIL;MOTRIN) 100 MG/5ML suspension 400 mg Q6H PRN   bisoprolol (ZEBETA) tablet 2.5 mg Daily   apixaban (ELIQUIS) tablet 5 mg BID   sodium chloride flush 0.9 % injection 10 mL 2 times per day   sodium chloride flush 0.9 % injection 10 mL PRN   albuterol (PROVENTIL) nebulizer solution 2.5 mg Q6H PRN   levETIRAcetam (KEPPRA) 100 MG/ML solution 567 mg BID   lisinopril (PRINIVIL;ZESTRIL) tablet 5 mg Daily   sodium chloride flush 0.9 % injection 10 mL 2 times per day   sodium chloride flush 0.9 % injection 10 mL PRN   magnesium hydroxide (MILK OF MAGNESIA) 400 MG/5ML suspension 30 mL Daily PRN   ondansetron (ZOFRAN) injection 4 mg Q6H PRN   famotidine (PEPCID) injection 20 mg BID   furosemide (LASIX) injection 20 mg Once   potassium chloride (KLOR-CON M) extended release tablet 40 mEq PRN   Or    potassium chloride 20 MEQ/15ML (10%) oral solution 40 mEq PRN   Or    potassium chloride 10 mEq/100 mL IVPB (Peripheral Line) PRN   heparin (porcine) injection 4,540 Units PRN   heparin (porcine) injection 2,270 Units PRN

## 2018-12-01 NOTE — PROGRESS NOTES
Date:                           12/1/2018  Patient name:           Tanner Goltz  Date of admission:  11/26/2018  2:13 PM  MRN:   [id number]  YOB: 1990  PCP:                           Yonatan Jo MD        Reason for consult: Pulmonary embolism  Subjective:   Pt seen and examined. Labs and vitals personally reviewed. Patient not able to give any meaningful history. No new complaint for patient's father at bedside. Patient on anticoagulation        REVIEW OF SYSTEMS:  Unable to obtain        Objective:     Vitals: /78   Pulse 86   Temp 96.3 °F (35.7 °C) (Axillary)   Resp 20   Ht 5' 6\" (1.676 m)   Wt 119 lb 3.2 oz (54.1 kg)   SpO2 98%   BMI 19.24 kg/m²   General appearance: nonverbal, development delay   HEENT: Head: Normocephalic,   Lungs: dimnished bilaterally  Heart:  S1, S2   Abdomen: soft, non-tender; bowel sounds normal; no masses,  no organomegaly  Extremities: b/l edema  Neurologic: Mental status: unable to assess        Data:    No intake/output data recorded. In: 330 [P.O.:320; I.V.:10]  Out: -     CBC:   Recent Labs      11/30/18 2049   WBC  5.1   HGB  14.4   PLT  263     BMP:    Recent Labs      11/30/18 2049   NA  139   K  3.8   CL  103   CO2  18*   BUN  18   CREATININE  0.79   GLUCOSE  92     Hepatic: No results for input(s): AST, ALT, ALB, BILITOT, ALKPHOS in the last 72 hours. INR:   Recent Labs      11/29/18   0631   INR  1.9     PTT:No results for input(s): PTT in the last 72 hours.   Results for orders placed or performed during the hospital encounter of 11/26/18   CBC Auto Differential   Result Value Ref Range    WBC 7.2 3.5 - 11.3 k/uL    RBC 4.67 4.21 - 5.77 m/uL    Hemoglobin 14.5 13.0 - 17.0 g/dL    Hematocrit 43.3 40.7 - 50.3 %    MCV 92.7 82.6 - 102.9 fL    MCH 31.0 25.2 - 33.5 pg    MCHC 33.5 28.4 - 34.8 g/dL    RDW 19.2 (H) 11.8 - 14.4 %    Platelets 255 122 - 491 k/uL    MPV 10.3 8.1 - 13.5 fL    NRBC Automated 0.3 (H) 0.0 per 100 2.8 fold  increased risk of venous thrombosis in individuals of [place]   ancestry. Patient DNA is assayed for the presence of wild type or mutant gene  sequences in the Factor II (Prothrombin) gene by the Invader Factor II  test that utilizes [place] chemistry for detecting gene-specific  sequences. Target amplification is followed by the signal generation  (Invader) phase of the assay. The Invader assay employs a genetically  engineered nuclease, known as a Cleavasee enzyme, to recognize and  cleave specific genomic structures formed by the addition of two  oligonucleotide probes [Wild Type (WT) or Mutant (Mut)] to a nucleic  acid target. Fluorescence signal intensity is enhanced through a  second Cleavasee cleavage reaction and fluorescence resonance energy  transfer (FRET), with detection of  fluorescent signal using a  fluorescence plate reader. Invader and Cleavase are registered  [address] [facility].  This test is performed pursuant to an  agreement with [address] [place] [address].          **Electronically Signed Out**         Chad Trammell M.D.      APTT   Result Value Ref Range    PTT 26.4 20.5 - 30.5 sec   Basic Metabolic Panel w/ Reflex to MG   Result Value Ref Range    Glucose 78 70 - 99 mg/dL    BUN 21 (H) 6 - 20 mg/dL    CREATININE 0.75 0.70 - 1.20 mg/dL    Bun/Cre Ratio NOT REPORTED 9 - 20    Calcium 9.1 8.6 - 10.4 mg/dL    Sodium 139 135 - 144 mmol/L    Potassium 3.7 3.7 - 5.3 mmol/L    Chloride 102 98 - 107 mmol/L    CO2 19 (L) 20 - 31 mmol/L    Anion Gap 18 (H) 9 - 17 mmol/L    GFR Non-African American >60 >60 mL/min    GFR African American >60 >60 mL/min    GFR Comment          GFR Staging NOT REPORTED    CBC auto differential   Result Value Ref Range    WBC 5.3 3.5 - 11.3 k/uL    RBC 4.48 4.21 - 5.77 m/uL    Hemoglobin 13.9 13.0 - 17.0 g/dL    Hematocrit 42.5 40.7 - 50.3 %    MCV 94.9 82.6 - 102.9 fL    MCH 31.0 25.2 - 33.5 pg    MCHC 32.7 28.4 - 34.8 g/dL    RDW 20.0 (H) 11.8 - Source of Specimen  1: RIGHT FEMORAL THROMBUS    Gross Description  \"TAMRA BAUMAN, RIGHT FEMORAL THROMBUS\" Fragments of dark red clot, 2.8 x  1.2 x 0.5 cm in aggregate. Entirely in 1cs. as      Microscopic Description  Microscopic examination performed. CBC WITH AUTO DIFFERENTIAL   Result Value Ref Range    WBC 5.1 3.5 - 11.3 k/uL    RBC 4.63 4.21 - 5.77 m/uL    Hemoglobin 14.4 13.0 - 17.0 g/dL    Hematocrit 44.2 40.7 - 50.3 %    MCV 95.5 82.6 - 102.9 fL    MCH 31.1 25.2 - 33.5 pg    MCHC 32.6 28.4 - 34.8 g/dL    RDW 20.1 (H) 11.8 - 14.4 %    Platelets 328 266 - 961 k/uL    MPV 11.1 8.1 - 13.5 fL    NRBC Automated 0.4 (H) 0.0 per 100 WBC    Differential Type NOT REPORTED     WBC Morphology NOT REPORTED     RBC Morphology ANISOCYTOSIS PRESENT     Platelet Estimate NOT REPORTED     Seg Neutrophils 52 36 - 65 %    Lymphocytes 43 24 - 43 %    Monocytes 4 3 - 12 %    Eosinophils % 0 (L) 1 - 4 %    Basophils 0 0 - 2 %    Immature Granulocytes 1 (H) 0 %    Segs Absolute 2.64 1.50 - 8.10 k/uL    Absolute Lymph # 2.17 1.10 - 3.70 k/uL    Absolute Mono # 0.21 0.10 - 1.20 k/uL    Absolute Eos # <0.03 0.00 - 0.44 k/uL    Basophils # <0.03 0.00 - 0.20 k/uL    Absolute Immature Granulocyte 0.03 0.00 - 0.30 k/uL   Basic Metabolic Panel w/ Reflex to MG   Result Value Ref Range    Glucose 92 70 - 99 mg/dL    BUN 18 6 - 20 mg/dL    CREATININE 0.79 0.70 - 1.20 mg/dL    Bun/Cre Ratio NOT REPORTED 9 - 20    Calcium 9.1 8.6 - 10.4 mg/dL    Sodium 139 135 - 144 mmol/L    Potassium 3.8 3.7 - 5.3 mmol/L    Chloride 103 98 - 107 mmol/L    CO2 18 (L) 20 - 31 mmol/L    Anion Gap 18 (H) 9 - 17 mmol/L    GFR Non-African American >60 >60 mL/min    GFR African American >60 >60 mL/min    GFR Comment          GFR Staging NOT REPORTED    POCT troponin   Result Value Ref Range    POC Troponin I 0.03 0.00 - 0.10 ng/mL    POC Troponin Interp       The Troponin-I (POC) results cannot be compared to the Troponin-T results.    POCT troponin Result Value Ref Range    POC Troponin I 0.05 0.00 - 0.10 ng/mL    POC Troponin Interp       The Troponin-I (POC) results cannot be compared to the Troponin-T results. POC Glucose Fingerstick   Result Value Ref Range    POC Glucose 82 75 - 110 mg/dL   EKG 12 Lead   Result Value Ref Range    Ventricular Rate 122 BPM    Atrial Rate 122 BPM    P-R Interval 138 ms    QRS Duration 72 ms    Q-T Interval 322 ms    QTc Calculation (Bazett) 458 ms    P Axis 55 degrees    R Axis -31 degrees    T Axis 109 degrees   EKG 12 Lead   Result Value Ref Range    Ventricular Rate 133 BPM    Atrial Rate 133 BPM    P-R Interval 136 ms    QRS Duration 70 ms    Q-T Interval 290 ms    QTc Calculation (Bazett) 431 ms    P Axis 64 degrees    R Axis -24 degrees    T Axis 97 degrees       Ct Abdomen Pelvis Wo Contrast Additional Contrast? Radiologist Recommendation    Result Date: 11/26/2018  EXAMINATION: CT OF THE ABDOMEN AND PELVIS WITHOUT CONTRAST 11/26/2018 6:53 pm TECHNIQUE: CT of the abdomen and pelvis was performed without the administration of intravenous contrast. Multiplanar reformatted images are provided for review. Dose modulation, iterative reconstruction, and/or weight based adjustment of the mA/kV was utilized to reduce the radiation dose to as low as reasonably achievable. COMPARISON: None. HISTORY: ORDERING SYSTEM PROVIDED HISTORY: follow up to prior chest CT with possible pneumobillia TECHNOLOGIST PROVIDED HISTORY: FINDINGS: Lower Chest: Moderate bilateral pleural effusions. Severe cardiomegaly. Mild edema. Organs: There is diffuse subcutaneous edema. The liver, spleen, pancreas, and adrenals appear normal.  Gallbladder normal. Kidneys appear normal. Hyperdense contrast in the bladder. GI/Bowel: The stomache,small bowel, and colon appear normal. Oral contrast is noted in the stomach in small bowel. There is a loop of colon in Morison's pouch but no evidence of. Nephric emphysema or pneumobilia.  Pelvis: Normal +------------------------------------+----------+---------------+----------+ ! Common Femoral                      !Yes       ! Yes            ! None      ! +------------------------------------+----------+---------------+----------+ ! Prox Femoral                        !Yes       ! Yes            ! None      ! +------------------------------------+----------+---------------+----------+ ! Mid Femoral                         !Yes       ! Yes            ! None      ! +------------------------------------+----------+---------------+----------+ ! Dist Femoral                        !Yes       ! Yes            ! None      ! +------------------------------------+----------+---------------+----------+ ! Popliteal                           !Yes       ! Yes            ! None      ! +------------------------------------+----------+---------------+----------+ ! Sapheno Femoral Junction            ! Yes       ! Yes            ! None      ! +------------------------------------+----------+---------------+----------+ ! PTV                                 ! Yes       ! Yes            ! None      ! +------------------------------------+----------+---------------+----------+ ! Peroneal                            !Yes       ! Yes            ! None      ! +------------------------------------+----------+---------------+----------+ ! Gastroc                             ! Yes       ! Yes            ! None      ! +------------------------------------+----------+---------------+----------+ ! GSV Thigh                           ! Yes       ! Yes            ! None      ! +------------------------------------+----------+---------------+----------+ ! GSV Knee                            ! Yes       ! Yes            ! None      ! +------------------------------------+----------+---------------+----------+ ! GSV Ankle                           ! Yes       ! Yes            ! None      ! +------------------------------------+----------+---------------+----------+ ! SSV

## 2018-12-01 NOTE — PROGRESS NOTES
Nutrition Assessment    Type and Reason for Visit: Consult (tube feeding)    Nutrition Recommendations:   -If  flexiflo placed/nocturnal TF's initiated, suggest Osmolite 1.5 at 75ml per hr over 11 hours from 8p-7a to provide 1238 kcal and 52 gm protein (meets ~80% estimated nutritional needs). Nutrition Assessment: RN reports pt has not had a flexiflo placed yet and unsure if one will be placed today. Pt continues with minimal nutritional intake. Malnutrition Assessment:  · Malnutrition Status: Meets the criteria for severe malnutrition  · Context: Chronic illness  · Findings of the 6 clinical characteristics of malnutrition (Minimum of 2 out of 6 clinical characteristics is required to make the diagnosis of moderate or severe Protein Calorie Malnutrition based on AND/ASPEN Guidelines):  1. Energy Intake-Less than 50% of estimated energy requirement for greater than or equal to 5 days,      2. Weight Loss-5% loss or greater, in 3 months  3. Fat Loss-Severe subcutaneous fat loss, Triceps, Fat overlying the ribs, Orbital  4. Muscle Loss-Severe muscle mass loss, Temples (temporalis muscle), Clavicles (pectoralis and deltoids), Calf (gastrocnemius), Interosseous  5. Fluid Accumulation-Mild fluid accumulation, Extremities  6.   Strength-Not measured    Nutrition Risk Level: High    Nutrient Needs:  · Estimated Daily Total Kcal: 6857-3997 kcals/day (28-30 kcal/kg)  · Estimated Daily Protein (g): 65-70 g/day (1.2-1.3 g/kg)    Nutrition Diagnosis:   · Problem: Severe malnutrition  · Etiology: related to Insufficient energy/nutrient consumption     Signs and symptoms:  as evidenced by Weight loss, Severe muscle loss, Severe loss of subcutaneous fat, Intake 0-25%    Objective Information:  · Nutrition-Focused Physical Findings:    · Wound Type: Surgical Wound  · Current Nutrition Therapies:  · Oral Diet Orders: General   · Oral Diet intake: 0%  · Oral Nutrition Supplement (ONS) Orders: Clear Liquid Oral Supplement  · ONS intake: 0%  · Anthropometric Measures:  · Ht: 5' 6\" (167.6 cm)   · Current Body Wt: 117 lb 8.1 oz (53.3 kg)        % Weight Change:  ,  7.3% loss x 3 months per EHR  · Ideal Body Wt: 142 lb (64.4 kg), % Ideal Body 83%  · BMI Classification: BMI 18.5 - 24.9 Normal Weight    Nutrition Interventions:   Continue current diet, Start Tube Feeding  Continued Inpatient Monitoring, Education not appropriate at this time    Nutrition Evaluation:   · Evaluation: No progress toward goals   · Goals: Meet greater than 50% of estimated nutrient needs with PO intake.     · Monitoring: Nutrition Progression, Meal Intake, Supplement Intake, Diet Tolerance, Weight      Electronically signed by Nicolle Silverman RD, [place] on 12/1/18 at 12:21 PM    Contact Number: [phone]

## 2018-12-01 NOTE — PROGRESS NOTES
without the administration of intravenous contrast. Multiplanar reformatted images are provided for review. Dose modulation, iterative reconstruction, and/or weight based adjustment of the mA/kV was utilized to reduce the radiation dose to as low as reasonably achievable. COMPARISON: None. HISTORY: ORDERING SYSTEM PROVIDED HISTORY: follow up to prior chest CT with possible pneumobillia TECHNOLOGIST PROVIDED HISTORY: FINDINGS: Lower Chest: Moderate bilateral pleural effusions. Severe cardiomegaly. Mild edema. Organs: There is diffuse subcutaneous edema. The liver, spleen, pancreas, and adrenals appear normal.  Gallbladder normal. Kidneys appear normal. Hyperdense contrast in the bladder. GI/Bowel: The stomache,small bowel, and colon appear normal. Oral contrast is noted in the stomach in small bowel. There is a loop of colon in Morison's pouch but no evidence of. Nephric emphysema or pneumobilia. Pelvis: Normal Peritoneum/Retroperitoneum: The abdominal aorta and iliac arteries are normal in caliber. There is no pathologic adenopathy. Bones/Soft Tissues: Normal     Moderate cardiomegaly and effusions. No acute disease such is perinephric gas or pneumobilia. Ct Head Wo Contrast    Result Date: 11/27/2018  EXAMINATION: CT OF THE HEAD WITHOUT CONTRAST  11/27/2018 3:42 pm TECHNIQUE: CT of the head was performed without the administration of intravenous contrast. Dose modulation, iterative reconstruction, and/or weight based adjustment of the mA/kV was utilized to reduce the radiation dose to as low as reasonably achievable. COMPARISON: None. HISTORY: ORDERING SYSTEM PROVIDED HISTORY: r/o infarct TECHNOLOGIST PROVIDED HISTORY: FINDINGS: BRAIN/VENTRICLES: There is no acute intracranial hemorrhage, mass effect or midline shift. No abnormal extra-axial fluid collection. The gray-white differentiation is maintained without evidence of an acute infarct. There is no evidence of hydrocephalus.  Chronic appearing lacunar venous examination of the deep and superficial systems. Findings are:   Right:  No evidence of deep or superficial venous thrombosis. Incidental finding of a thrombus in the arterial system concerning for  total occlusion. Left:  No evidence of deep or superficial venous thrombosis. Recommendations   Recommend evaluation of right lower extremity arterial system for arterial  insufficiency due to thrombus   Signature   ----------------------------------------------------------------  Electronically signed by Sandrine Dumont(Sonographer) on  11/27/2018 09:13 AM  ----------------------------------------------------------------   ----------------------------------------------------------------  Electronically signed by Raul Alberto(Interpreting physician)  on 11/28/2018 03:15 PM  ----------------------------------------------------------------  Findings:   Right Impression:                    Left Impression: Thrombus visualized in the common    The common femoral, femoral,  femoral, proximal superficial        popliteal and tibial veins  femoral and proximal profunda        demonstrate normal compressibility  femoral arteries. and augmentation. CFA peak systolic velocity 097  cm/sec                               Normal compressibility of the great  Proximal SFA peak systolic velocity  saphenous vein. 14.4 cm./sec  Mid SFA peak systolic velocity 44.5  Normal compressibility of the small  cm/sec                               saphenous vein. The common femoral, femoral,  popliteal and tibial veins  demonstrate normal compressibility  and augmentation. Normal compressibility of the great  saphenous vein. Normal compressibility of the small  saphenous vein. Risk Factors History +---------+----------+-----------------------------------------------------+ ! Diagnosis! Date      ! Comments                                             ! +------------------------------------+----------+---------------+----------+ ! Location                            ! Visualized! Compressibility! Thrombosis! +------------------------------------+----------+---------------+----------+ ! Common Femoral                      !Yes       ! Yes            ! None      ! +------------------------------------+----------+---------------+----------+ ! Prox Femoral                        !Yes       ! Yes            ! None      ! +------------------------------------+----------+---------------+----------+ ! Mid Femoral                         !Yes       ! Yes            ! None      ! +------------------------------------+----------+---------------+----------+ ! Dist Femoral                        !Yes       ! Yes            ! None      ! +------------------------------------+----------+---------------+----------+ ! Popliteal                           !Yes       ! Yes            ! None      ! +------------------------------------+----------+---------------+----------+ ! Sapheno Femoral Junction            ! Yes       ! Yes            ! None      ! +------------------------------------+----------+---------------+----------+ ! PTV                                 ! Yes       ! Yes            ! None      ! +------------------------------------+----------+---------------+----------+ ! Peroneal                            !Yes       ! Yes            ! None      ! +------------------------------------+----------+---------------+----------+ ! Gastroc                             ! Yes       ! Yes            ! None      ! +------------------------------------+----------+---------------+----------+ ! GSV Thigh                           ! Yes       ! Yes            ! None      ! +------------------------------------+----------+---------------+----------+ ! GSV Knee                            ! Yes       ! Yes            ! None      ! +------------------------------------+----------+---------------+----------+ ! GSV Ankle at 11/30/18 0939    sodium chloride flush 0.9 % injection 10 mL  10 mL Intravenous 2 times per day Amos Dumont MD   10 mL at 12/01/18 1166    sodium chloride flush 0.9 % injection 10 mL  10 mL Intravenous PRN Amos Dumont MD        magnesium hydroxide (MILK OF MAGNESIA) 400 MG/5ML suspension 30 mL  30 mL Oral Daily PRN Amos Dumont MD        ondansetron (ZOFRAN) injection 4 mg  4 mg Intravenous Q6H PRN Amos Dumont MD   4 mg at 11/29/18 0128    famotidine (PEPCID) injection 20 mg  20 mg Intravenous BID Amos Dumont MD   20 mg at 12/01/18 0932    furosemide (LASIX) injection 20 mg  20 mg Intravenous Once Amos Dumont MD        potassium chloride (KLOR-CON M) extended release tablet 40 mEq  40 mEq Oral PRN Amos Dumont MD        Or    potassium chloride 20 MEQ/15ML (10%) oral solution 40 mEq  40 mEq Oral PRN Amos Dumont MD        Or    potassium chloride 10 mEq/100 mL IVPB (Peripheral Line)  10 mEq Intravenous PRN Amos Dumont MD        heparin (porcine) injection 4,540 Units  80 Units/kg Intravenous PRN Rosalio James MD        heparin (porcine) injection 2,270 Units  40 Units/kg Intravenous PRN Rosalio James MD   2,270 Units at 11/29/18 0917    acetaminophen (TYLENOL) 160 MG/5ML solution 650 mg  650 mg Oral Q4H PRN Amos Dumont MD   650 mg at 12/01/18 0159       ASSESSMENT:     Principal Problem:    Pulmonary embolus ([address]  Active Problems:    Seizure disorder ([address]    Mental retardation    Atrial thrombus without antecedent myocardial infarction    Chronic systolic congestive heart failure ([address]    Protein-calorie malnutrition ([address]    Congestive heart failure ([address]  Resolved Problems:    * No resolved hospital problems. *      PLAN:     Severe protein calorie malnutrition  - We'll discuss with the father today in regards to Flexiflo placement and proceed with placement if father consents.   Concerned that the patient will not leave to [facility] in place and will

## 2018-12-02 NOTE — PROGRESS NOTES
[facility]  Progress Note    Date:   12/2/2018  Patient name:  Balwinder Falcon  Date of admission:  11/26/2018  2:13 PM  MRN:   [id number]  YOB: 1990    SUBJECTIVE/Last 24 hours update:     Patient seen and examined at the bed side , no new acute events overnight and no new complaints. Patient is asleep and resting comfortably on examination. Father is not bedside, but patient does not appear to be in acute distress. Patient stable overnight, but was frequently moaning, agitated, and tachycardic. Per nursing, ativan did not alleviate symptoms, but patient responded to morphine and slept after morphine was provided. Patient's tachycardia also improved after analgesia was provided. Will continue to monitor closely for non-verbal signs of pain/discomfort. Father reportedly refused bedside placement of flexiflo by nursing staff yesterday, stated that he wanted IR to place flexiflo. Spoke with IR this morning. Will discuss with father today. Notes from nursing staff and Consults had been reviewed, and the overnight progress had been checked with the nursing staff as well. HPI:   The patient is a 33 y.o.  male with history of Systolic HF (EF 13%), Developmental delay and Blood clots (atreal thrombus contolled with coumadin) who presented with pre syncope. History provided by father who is his primary care giver - Pt does not have ability to speak (MR). States he has had loss of appetite for past few weeks, refusing nutritional shakes, not improved with supplements. The father states they had appt for echo today, upon arrival at the hospital, pt became very weak and his father had to have him sit on the ground, did not lose consciousness or have seizure like activity.  The father brought the pt to the ED.  In June, pt diagnosed with CHF (EF 10%), following with [facility] cardiology - being evaluated for AICD implantation.  Pt has been following up tablet Take 1 tablet by mouth daily 8/20/18  Yes Radha Ji MD   Acetaminophen (TYLENOL) 167 MG/5ML LIQD Take 5 mLs by mouth every 6 hours as needed for Pain or Fever Take as 7/9/18  Yes Karan Multani MD   Dextromethorphan-guaiFENesin ([place] FAST-MAX DM MAX) 5-100 MG/5ML LIQD Take 5 mLs by mouth every 12 hours as needed (fever or pain) 7/9/18  Yes Karan Multani MD   carvedilol (COREG) 3.125 MG tablet Take 3.125 mg by mouth 2 times daily (with meals)   Yes Historical Provider, MD   cetirizine (ZYRTEC) 1 MG/ML SOLN syrup Take 10 mLs by mouth daily 6/11/18  Yes Karan Multani MD   guaiFENesin (MUCINEX) 600 MG extended release tablet Take 1 tablet by mouth 2 times daily 5/24/18  Yes Karan Multani MD   albuterol (PROVENTIL) (2.5 MG/3ML) 0.083% nebulizer solution Take 3 mLs by nebulization every 6 hours as needed for Wheezing 5/24/18  Yes Karan Multani MD   levETIRAcetam (KEPPRA) 100 MG/ML solution Take 10 mg/kg by mouth 2 times daily   Yes Historical Provider, MD   Incontinence Supply Disposable ([address] [place] [address]) MISC Use as directed 1/22/18  Yes Karan Multani MD   RA FEVER REDUCER/PAIN RELIEVER 160 MG/5ML suspension take 5 milliliters by mouth every 6 hours if needed for pain or fever 11/27/18   Karan Multani MD       ALLERGIES:     Seasonal      OBJECTIVE:       Vitals:    12/01/18 0800 12/01/18 1830 12/02/18 0836 12/02/18 0934   BP: 110/78 108/78 (!) 108/92    Pulse: 86 114 85    Resp: 20 21 20    Temp: 96.3 °F (35.7 °C) 97.1 °F (36.2 °C) 97.4 °F (36.3 °C)    TempSrc: Axillary Axillary Axillary    SpO2: 98% 99% 99% 99%   Weight:   119 lb (54 kg)    Height:             Intake/Output Summary (Last 24 hours) at 12/02/18 1034  Last data filed at 12/01/18 1918   Gross per 24 hour   Intake               60 ml   Output                3 ml   Net               57 ml       PHYSICAL EXAM:  General Appearance  Sleeping, easily arousable .  Severely malnourished  HEENT - Head is normocephalic, atraumatic. Lungs - Bilateral equal air entry , no wheezes, rales or rhonchi, aeration good  Cardiovascular - Regular rhythm, normal rate without murmur, gallop or rub. Abdomen - Soft, nontender, nondistended, no masses or organomegaly  Neurologic - There are no new focal motor or sensory deficits  Skin - No bruising or bleeding on exposed skin area  Extremities - No cyanosis, clubbing or edema    DIAGNOSTICS:     Laboratory Testing:    No results found for this or any previous visit (from the past 24 hour(s)). Imaging/Diagonstics:  Ct Abdomen Pelvis Wo Contrast Additional Contrast? Radiologist Recommendation    Result Date: 11/26/2018  EXAMINATION: CT OF THE ABDOMEN AND PELVIS WITHOUT CONTRAST 11/26/2018 6:53 pm TECHNIQUE: CT of the abdomen and pelvis was performed without the administration of intravenous contrast. Multiplanar reformatted images are provided for review. Dose modulation, iterative reconstruction, and/or weight based adjustment of the mA/kV was utilized to reduce the radiation dose to as low as reasonably achievable. COMPARISON: None. HISTORY: ORDERING SYSTEM PROVIDED HISTORY: follow up to prior chest CT with possible pneumobillia TECHNOLOGIST PROVIDED HISTORY: FINDINGS: Lower Chest: Moderate bilateral pleural effusions. Severe cardiomegaly. Mild edema. Organs: There is diffuse subcutaneous edema. The liver, spleen, pancreas, and adrenals appear normal.  Gallbladder normal. Kidneys appear normal. Hyperdense contrast in the bladder. GI/Bowel: The stomache,small bowel, and colon appear normal. Oral contrast is noted in the stomach in small bowel. There is a loop of colon in Morison's pouch but no evidence of. Nephric emphysema or pneumobilia. Pelvis: Normal Peritoneum/Retroperitoneum: The abdominal aorta and iliac arteries are normal in caliber. There is no pathologic adenopathy. Bones/Soft Tissues: Normal     Moderate cardiomegaly and effusions.   No acute disease such is perinephric gas or pneumobilia. Ct Head Wo Contrast    Result Date: 11/27/2018  EXAMINATION: CT OF THE HEAD WITHOUT CONTRAST  11/27/2018 3:42 pm TECHNIQUE: CT of the head was performed without the administration of intravenous contrast. Dose modulation, iterative reconstruction, and/or weight based adjustment of the mA/kV was utilized to reduce the radiation dose to as low as reasonably achievable. COMPARISON: None. HISTORY: ORDERING SYSTEM PROVIDED HISTORY: r/o infarct TECHNOLOGIST PROVIDED HISTORY: FINDINGS: BRAIN/VENTRICLES: There is no acute intracranial hemorrhage, mass effect or midline shift. No abnormal extra-axial fluid collection. The gray-white differentiation is maintained without evidence of an acute infarct. There is no evidence of hydrocephalus. Chronic appearing lacunar infarction within the left cerebellar white matter. ORBITS: The visualized portion of the orbits demonstrate no acute abnormality. SINUSES: The visualized paranasal sinuses and mastoid air cells demonstrate no acute abnormality. SOFT TISSUES/SKULL:  No acute abnormality of the visualized skull or soft tissues. Chronic appearing lacunar infarction within the left cerebellum. No obvious acute intracranial pathology. If there is clinical suspicion for CVA, please consider further evaluation with MRI. Xr Chest Portable    Result Date: 11/26/2018  EXAMINATION: SINGLE XRAY VIEW OF THE CHEST 11/26/2018 2:45 pm COMPARISON: None. HISTORY: ORDERING SYSTEM PROVIDED HISTORY: cp syncope EF 10% TECHNOLOGIST PROVIDED HISTORY: cp syncope EF 10% Ordering Physician Provided Reason for Exam: chest pain, syncope. upr Acuity: Unknown Type of Exam: Unknown FINDINGS: Enlarged of the cardiac silhouette. Central pulmonary vessels are prominent and peripheral pulmonary vessels are indistinct. Left perihilar airspace disease. Subpulmonic left pleural effusion.      Cardiomegaly and/or pericardial effusion with mild CHF and left pleural effusion which is predominantly subpulmonic     Ct Chest Pulmonary Embolism W Contrast    Result Date: 11/26/2018  EXAMINATION: CTA OF THE CHEST 11/26/2018 5:01 pm TECHNIQUE: CTA of the chest was performed after the administration of intravenous contrast.  Multiplanar reformatted images are provided for review. MIP images are provided for review. Dose modulation, iterative reconstruction, and/or weight based adjustment of the mA/kV was utilized to reduce the radiation dose to as low as reasonably achievable. COMPARISON: Chest x-ray from today HISTORY: ORDERING SYSTEM PROVIDED HISTORY: r/o PE, PTX Syncope FINDINGS: Pulmonary Arteries: Pulmonary arteries are adequately opacified for evaluation. Possible filling defect segmental branch lingula. Main pulmonary artery is normal in caliber. Mediastinum: No evidence of mediastinal lymphadenopathy. The heart and pericardium demonstrate no acute abnormality. There is no acute abnormality of the thoracic aorta. Severe cardiomegaly. Minimal if any pericardial effusion. Lungs/Pleura: Moderate bilateral pleural effusions. Mild perihilar ground-glass opacities consistent with edema. Upper Abdomen: Possible perinephric emphysema and/or pneumobilia. Soft Tissues/Bones: No acute bone or soft tissue abnormality. Possible pulmonary embolism lingular segment pulmonary artery. Severe cardiomegaly, edema and effusions consistent with probable CHF. Possible perinephric emphysema or pneumobilia. Recommend follow-up CT of the abdomen and pelvis. RECOMMENDATIONS: D/w emergency physician dr Marlon Mcrae at 5:25 p.m.      Vl Dup Lower Extremity Venous Bilateral    Result Date: 11/28/2018    [facility]  Vascular Lower Extremities DVT Study Procedure   Patient Name   Sommer Pineda  Date of Study           11/27/2018   Date of Birth  1990  Gender                  Male   Age            33 year(s)  Race                    Black   Room Number    1006        Height:                 66 inch, augmentation. CFA peak systolic velocity 049  cm/sec                               Normal compressibility of the great  Proximal SFA peak systolic velocity  saphenous vein. 14.4 cm./sec  Mid SFA peak systolic velocity 41.1  Normal compressibility of the small  cm/sec                               saphenous vein. The common femoral, femoral,  popliteal and tibial veins  demonstrate normal compressibility  and augmentation. Normal compressibility of the great  saphenous vein. Normal compressibility of the small  saphenous vein. Risk Factors History +---------+----------+-----------------------------------------------------+ ! Diagnosis! Date      ! Comments                                             ! +---------+----------+-----------------------------------------------------+ ! CHF      ! !                                                     ! +---------+----------+-----------------------------------------------------+ ! Other    ! ! Atrial thrombus seen on echo 8-18                    ! +---------+----------+-----------------------------------------------------+ ! Other    ! ! Mental retardation                                   ! +---------+----------+-----------------------------------------------------+ ! Other    !11/26/2018! Pulmonary embolism                                   ! +---------+----------+-----------------------------------------------------+ Velocities are measured in cm/s ; Diameters are measured in cm Right Lower Extremities DVT Study Measurements Right 2D Measurements +------------------------------------+----------+---------------+----------+ ! Location                            ! Visualized! Compressibility! Thrombosis! +------------------------------------+----------+---------------+----------+ ! Common Femoral                      !Yes       ! Yes            ! None      ! +------------------------------------+----------+---------------+----------+ ! Prox Femoral +------------------------------------+----------+---------------+----------+ ! Peroneal                            !Yes       ! Yes            ! None      ! +------------------------------------+----------+---------------+----------+ ! Gastroc                             ! Yes       ! Yes            ! None      ! +------------------------------------+----------+---------------+----------+ ! GSV Thigh                           ! Yes       ! Yes            ! None      ! +------------------------------------+----------+---------------+----------+ ! GSV Knee                            ! Yes       ! Yes            ! None      ! +------------------------------------+----------+---------------+----------+ ! GSV Ankle                           ! Yes       ! Yes            ! None      ! +------------------------------------+----------+---------------+----------+ ! SSV                                 ! Yes       ! Yes            ! None      ! +------------------------------------+----------+---------------+----------+ Left Doppler Measurements +---------------------------+------+------+--------------------------------+ ! Location                   ! Signal!Reflux! Reflux (msec)                   ! +---------------------------+------+------+--------------------------------+ ! Common Femoral             !Phasic!      !                                ! +---------------------------+------+------+--------------------------------+ ! Prox Femoral               !Phasic!      !                                ! +---------------------------+------+------+--------------------------------+ ! Popliteal                  !Phasic!      !                                ! +---------------------------+------+------+--------------------------------+    Current Facility-Administered Medications   Medication Dose Route Frequency Provider Last Rate Last Dose    morphine injection 2 mg  2 mg Intravenous Q2H PRN Charles Mg MD        Or    morphine (PF) injection 4 mg  4

## 2018-12-02 NOTE — CONSULTS
Per sister, patient feels uncomfortable with the 4 mg of IV morphine. Would recommend lowering the dose. 2- Goals of care evaluation   The patient goals of care are improve or maintain function/quality of life, provide comfort care/support/palliation/relieve suffering, strengthening relationships, preserve independence/autonomy/control and support for family/caregiver   Goals of care discussed with:    [] Patient independently    [] Patient and Family    [x] Family or Healthcare DPOA independently    [] Unable to discuss with patient, family/DPOA not present    3- Code Status  Full Code    4- Other recommendations   - We will continue to provide comfort and support to the patient and the family  Please call with any palliative questions or concerns. Palliative Care Team is available via perfect serve or via phone. Palliative Care will continue to follow Mr. Quinton Clark care as needed. This note has been dictated by dragon, typing errors may be a possibility. Thank you for allowing Palliative Care to participate in the care of Mr. Ella De La Vega .     Electronically signed by   Ric Flores MD  Palliative Care Team  on 12/2/2018 at 10:17 AM    Palliative care office: [phone]

## 2018-12-03 NOTE — PLAN OF CARE
Problem: Risk for Impaired Skin Integrity  Goal: Tissue integrity - skin and mucous membranes  Structural intactness and normal physiological function of skin and  mucous membranes.    Outcome: Ongoing      Problem: Falls - Risk of:  Goal: Will remain free from falls  Will remain free from falls   Outcome: Ongoing    Goal: Absence of physical injury  Absence of physical injury   Outcome: Ongoing      Problem: Safety:  Goal: Free from accidental physical injury  Free from accidental physical injury   Outcome: Ongoing    Goal: Free from intentional harm  Free from intentional harm   Outcome: Ongoing      Problem: Pain:  Goal: Patient's pain/discomfort is manageable  Patient's pain/discomfort is manageable   Outcome: Ongoing    Goal: Pain level will decrease  Pain level will decrease   Outcome: Ongoing    Goal: Control of acute pain  Control of acute pain   Outcome: Ongoing    Goal: Control of chronic pain  Control of chronic pain   Outcome: Ongoing      Problem: Skin Integrity:  Goal: Skin integrity will stabilize  Skin integrity will stabilize   Outcome: Ongoing      Problem: Nutrition  Goal: Optimal nutrition therapy  Outcome: Ongoing      Problem: Musculor/Skeletal Functional Status  Goal: Highest potential functional level  Outcome: Ongoing

## 2018-12-03 NOTE — PROGRESS NOTES
PROGRESS NOTE          PATIENT NAME: [address] RECORD NO. [id number]  DATE: 12/3/2018  SURGEON: Dr. Esequiel Shannon: John Mitchell MD    HD: # 7    ASSESSMENT    Patient Active Problem List   Diagnosis    Seizure disorder [facility])    Mental retardation    Acute systolic congestive heart failure ([address]    Acute on chronic systolic CHF (congestive heart failure), NYHA class 4 (HCC)    Loss of appetite    Acute on chronic congestive heart failure (HCC)    Atrial thrombus without antecedent myocardial infarction    Pulmonary embolus (HCC)    Chronic systolic congestive heart failure (HCC)    Protein-calorie malnutrition (HCC)    Femoral popliteal artery thrombus (HCC)    Congestive heart failure [facility])       MEDICAL DECISION MAKING AND PLAN    Patient is a poor surgical candidate due to HFrEF, malnutrition, and ongoing anticoagulation with Eliquis for atrial thrombus and PE. We recommend placement of flexiflo NGT with bridle, for tube improvement in nutritional status prior to operative management. Father not present during evaluation. Will speak with him if present in the AM.    Sophie Tim is a 33 y.o. presenting with Atrial thrombus, femoral DVT, and PE on Eliquis. Hx of MRDD, HFrEF 10%. Months of decreased PO intake and weight loss. Seen on 11/30 for consult of PEG. Subsequently had NG placed by IR, but self removed it. TF did not reach goal. No known history of reflux. OBJECTIVE  VITALS: Temp: Temp: 97.4 °F (36.3 °C)No Data Recorded BP No data recorded. No data recorded. Pulse No Data Recorded Resp No Data Recorded Pulse ox No Data Recorded  CONSTITUTIONAL: Cachetic  male    HEENT: NCAT  NECK: Soft, trachea midline and straight  LUNGS: negative findings: normal respiratory rate and rhythm  CARDIOVASCULAR: Heart regular rate and rhythm  ABDOMEN: soft, nontender, nondistended, no masses or organomegaly  NEUROLOGIC: awake, alert. Verbalizes with indiscernible noises   EXTREMITIES: no cyanosis, clubbing or edema    No intake/output data recorded. Drain/tube output:  No intake/output data recorded. LAB:  CBC:   Recent Labs      11/30/18 2049 12/03/18   0928   WBC  5.1  4.9   HGB  14.4  14.4   HCT  44.2  46.0   MCV  95.5  98.9   PLT  263  319     BMP:   Recent Labs      11/30/18 2049 12/03/18   0928   NA  139  145*   K  3.8  3.7   CL  103  110*   CO2  18*  19*   BUN  18  24*   CREATININE  0.79  0.88   GLUCOSE  92  105*         Olu Bello MD  12/3/18, 7:00 PM               Trauma Attending Mary Reynolds      I have reviewed the above [facility] note(s) and confirmed the key elements of the medical history and physical exam. I have seen and examined the pt. I have discussed the findings, established the care plan and recommendations with Resident, [facility] RN, bedside nurse.   Would replace and bridle tube        Hermina Habermann, DO  12/3/2018  8:10 PM

## 2018-12-03 NOTE — PROGRESS NOTES
[facility]  Progress Note    Date:   12/3/2018  Patient name:  Claritza Deleon  Date of admission:  11/26/2018  2:13 PM  MRN:   [id number]  YOB: 1990    SUBJECTIVE/Last 24 hours update:     Patient seen and examined at the bed side. Pt has 3 uns of VTach lasting appro 20 beats overnight. Discussed with father at bedside. Father states that Rita Villanueva is not acting himself. Notes from nursing staff and Consults had been reviewed, and the overnight progress had been checked with the nursing staff as well. HPI:   The patient is a 33 y.o.  male with history of Systolic HF (EF 05%), Developmental delay and Blood clots (atreal thrombus contolled with coumadin) who presented with pre syncope. History provided by father who is his primary care giver - Pt does not have ability to speak (). States he has had loss of appetite for past few weeks, refusing nutritional shakes, not improved with supplements. The father states they had appt for echo today, upon arrival at the hospital, pt became very weak and his father had to have him sit on the ground, did not lose consciousness or have seizure like activity. The father brought the pt to the ED.  In June, pt diagnosed with CHF (EF 10%), following with [facility] cardiology - being evaluated for AICD implantation.  Pt has been following up with Coumadin clinic.      At ER, VSS except tachycardia, physical exam revealed muffled heart sounds, bilateral pedal edema up to the knees, palpable pedal pulses. CBC and trops were negative. INR and Keppra levels were therapeutic. EKG normal.  CTPE: blood clot in the lingual artery. Started on heparin drip. Pulm consulted, dc heparin, started lovenox. REVIEW OF SYSTEMS:      Unable to obtain pt is non verbal     PAST MEDICAL HISTORY:      has a past medical history of Acute systolic congestive heart failure ([address]  Asthma; Development delay; and Seizures Brandie Reece MD   guaiFENesin [facility]) 600 MG extended release tablet Take 1 tablet by mouth 2 times daily 5/24/18  Yes Arpita Ferrell MD   albuterol (PROVENTIL) (2.5 MG/3ML) 0.083% nebulizer solution Take 3 mLs by nebulization every 6 hours as needed for Wheezing 5/24/18  Yes Arpita Ferrell MD   levETIRAcetam (KEPPRA) 100 MG/ML solution Take 10 mg/kg by mouth 2 times daily   Yes Historical Provider, MD   Incontinence Supply Disposable ([address]) MISC Use as directed 1/22/18  Yes Arpita Ferrell MD   RA FEVER REDUCER/PAIN RELIEVER 160 MG/5ML suspension take 5 milliliters by mouth every 6 hours if needed for pain or fever 11/27/18   Arpita Ferrell MD       ALLERGIES:     Seasonal      OBJECTIVE:       Vitals:    12/01/18 0800 12/01/18 1830 12/02/18 0836 12/02/18 0934   BP: 110/78 108/78 (!) 108/92    Pulse: 86 114 85    Resp: 20 21 20    Temp: 96.3 °F (35.7 °C) 97.1 °F (36.2 °C) 97.4 °F (36.3 °C)    TempSrc: Axillary Axillary Axillary    SpO2: 98% 99% 99% 99%   Weight:   119 lb (54 kg)    Height:           No intake or output data in the 24 hours ending 12/03/18 1001    PHYSICAL EXAM:  General Appearance  Alert , awake , not in acute distress  HEENT - Head is normocephalic, atraumatic. Lungs - Bilateral equal air entry , no wheezes, rales or rhonchi, aeration good  Cardiovascular - tachycardic. Heart sounds are normal.  Regular rhythm, normal rate without murmur, gallop or rub.   Abdomen - Soft, nontender, nondistended, no masses or organomegaly  Neurologic - Seems somewhat more confused spacing out in the space   Skin - No bruising or bleeding on exposed skin area  Extremities - No cyanosis, clubbing or edema    DIAGNOSTICS:     Laboratory Testing:    Recent Results (from the past 24 hour(s))   POC Glucose Fingerstick    Collection Time: 12/02/18  5:36 PM   Result Value Ref Range    POC Glucose 68 (L) 75 - 110 mg/dL   CBC Auto Differential    Collection Time: 12/03/18  9:28 AM   Result Value Ref Range    WBC 4.9 3.5 - 11.3 k/uL    RBC 4.65 4.21 - 5.77 m/uL    Hemoglobin 14.4 13.0 - 17.0 g/dL    Hematocrit 46.0 40.7 - 50.3 %    MCV 98.9 82.6 - 102.9 fL    MCH 31.0 25.2 - 33.5 pg    MCHC 31.3 28.4 - 34.8 g/dL    RDW 20.8 (H) 11.8 - 14.4 %    Platelets 813 314 - 651 k/uL    MPV 11.1 8.1 - 13.5 fL    NRBC Automated 0.8 (H) 0.0 per 100 WBC    Differential Type NOT REPORTED     Seg Neutrophils Pending %    Lymphocytes Pending %    Monocytes Pending %    Eosinophils % Pending %    Basophils Pending %    Immature Granulocytes Pending 0 %    Segs Absolute Pending k/uL    Absolute Lymph # Pending k/uL    Absolute Mono # Pending k/uL    Absolute Eos # Pending k/uL    Basophils # Pending 0.0 - 0.2 k/uL    Absolute Immature Granulocyte Pending 0.00 - 0.30 k/uL    WBC Morphology NOT REPORTED     RBC Morphology NOT REPORTED     Platelet Estimate NOT REPORTED          Imaging/Diagonstics:    CXR: Ct Abdomen Pelvis Wo Contrast Additional Contrast? Radiologist Recommendation    Result Date: 11/26/2018  EXAMINATION: CT OF THE ABDOMEN AND PELVIS WITHOUT CONTRAST 11/26/2018 6:53 pm TECHNIQUE: CT of the abdomen and pelvis was performed without the administration of intravenous contrast. Multiplanar reformatted images are provided for review. Dose modulation, iterative reconstruction, and/or weight based adjustment of the mA/kV was utilized to reduce the radiation dose to as low as reasonably achievable. COMPARISON: None. HISTORY: ORDERING SYSTEM PROVIDED HISTORY: follow up to prior chest CT with possible pneumobillia TECHNOLOGIST PROVIDED HISTORY: FINDINGS: Lower Chest: Moderate bilateral pleural effusions. Severe cardiomegaly. Mild edema. Organs: There is diffuse subcutaneous edema. The liver, spleen, pancreas, and adrenals appear normal.  Gallbladder normal. Kidneys appear normal. Hyperdense contrast in the bladder. GI/Bowel:  The stomache,small bowel, and colon appear normal. Oral contrast is noted in the stomach in small pain, syncope. upr Acuity: Unknown Type of Exam: Unknown FINDINGS: Enlarged of the cardiac silhouette. Central pulmonary vessels are prominent and peripheral pulmonary vessels are indistinct. Left perihilar airspace disease. Subpulmonic left pleural effusion. Cardiomegaly and/or pericardial effusion with mild CHF and left pleural effusion which is predominantly subpulmonic     Ct Chest Pulmonary Embolism W Contrast    Result Date: 11/26/2018  EXAMINATION: CTA OF THE CHEST 11/26/2018 5:01 pm TECHNIQUE: CTA of the chest was performed after the administration of intravenous contrast.  Multiplanar reformatted images are provided for review. MIP images are provided for review. Dose modulation, iterative reconstruction, and/or weight based adjustment of the mA/kV was utilized to reduce the radiation dose to as low as reasonably achievable. COMPARISON: Chest x-ray from today HISTORY: ORDERING SYSTEM PROVIDED HISTORY: r/o PE, PTX Syncope FINDINGS: Pulmonary Arteries: Pulmonary arteries are adequately opacified for evaluation. Possible filling defect segmental branch lingula. Main pulmonary artery is normal in caliber. Mediastinum: No evidence of mediastinal lymphadenopathy. The heart and pericardium demonstrate no acute abnormality. There is no acute abnormality of the thoracic aorta. Severe cardiomegaly. Minimal if any pericardial effusion. Lungs/Pleura: Moderate bilateral pleural effusions. Mild perihilar ground-glass opacities consistent with edema. Upper Abdomen: Possible perinephric emphysema and/or pneumobilia. Soft Tissues/Bones: No acute bone or soft tissue abnormality. Possible pulmonary embolism lingular segment pulmonary artery. Severe cardiomegaly, edema and effusions consistent with probable CHF. Possible perinephric emphysema or pneumobilia. Recommend follow-up CT of the abdomen and pelvis. RECOMMENDATIONS: D/w emergency physician dr Anita Craft at 5:25 p.m.      Vl Dup Lower Extremity Venous Alexandre Pryor MD   4 mg at 11/29/18 0128    famotidine (PEPCID) injection 20 mg  20 mg Intravenous BID Alexandre Pryor MD   20 mg at 12/02/18 2135    potassium chloride (KLOR-CON M) extended release tablet 40 mEq  40 mEq Oral PRN Alexandre Pryor MD        Or    potassium chloride 20 MEQ/15ML (10%) oral solution 40 mEq  40 mEq Oral PRN Alexandre Pryor MD        Or    potassium chloride 10 mEq/100 mL IVPB (Peripheral Line)  10 mEq Intravenous PRN Alexandre Pryor MD        acetaminophen (TYLENOL) 160 MG/5ML solution 650 mg  650 mg Oral Q4H PRN Alexandre Pryor MD   650 mg at 12/01/18 1823       ASSESSMENT:     Principal Problem:    Pulmonary embolus ([address]  Active Problems:    Seizure disorder ([address]    Mental retardation    Atrial thrombus without antecedent myocardial infarction    Chronic systolic congestive heart failure ([address]    Protein-calorie malnutrition ([address]    Congestive heart failure ([address]  Resolved Problems:    * No resolved hospital problems. *      PLAN:     Severe protein calorie malnutrition  - Per nursing, patient's father reportedly refused attempt to place flexiflo bedside yesterday. 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     Above plan discussed with the father in room, who agreed to the above plan     DVT prophylaxis: already anticoagulated with eliquis  GI prophylaxis:

## 2018-12-03 NOTE — PROGRESS NOTES
Palliative Care Progress Note    NAME:  García Swenson  MEDICAL RECORD NUMBER:  [id number]  AGE: 33 y.o. GENDER: male  : 1990  TODAY'S DATE:  12/3/2018    Reason for Consult:  symptom management and goals of care  History of Present Illness     The patient is a 33 y.o. Non-/non  male who presents with Loss of Consciousness (Pt brought to the ED by EMS with father at bedside. Father states that pt was coming to this facility today for an echo but states that pt passed out before they were able to make it in for the echo. Father states that pt has been having weakness for a couple of days now and that pt has lower extremity swelling. Father states that pt has been c/o headaches for the past 3 days, father has been giving pt aspirin for the pain) and Fatigue      Referred to Palliative Care by  [x] Physician   [] Nursing  [] Family Request   [] Other:       He was admitted to the [facility] medicine service for Pulmonary embolism on long-term anticoagulation therapy ([facility] [address] [facility] [I26.99, Z79.01]. His hospital course has been associated with Pulmonary embolus ([address] The patient has a complicated medical history and has been hospitalized since 2018  2:13 PM.  Patient with history of MRDD/autism. Patient with systolic CHF with echo from 2018 revealing an ejection fraction of 10%. Cardiology and EP are following and plan is for AICD placement if patient's nutritional status improves. Patient diagnosed with protein calorie deficiency by dietitian. Plan is for IR to place a Flexiflo NG tube and for outpatient follow-up with Gen. surgery for possible PEG tube placement. Patient with pre-albumin low at 13.3. Albumin from 2018 is low at 3.0. Vascular surgery had been following as patient was found to have a right common femoral artery occlusion and underwent femoral thrombectomy. Vascular surgery recommending lifelong anticoagulation.   Patient switched to Eliquis 5 mg twice a day from [] clubbing/cyanosis      [x]  No edema      [] Other:     Palliative Performance Scale:  ___60%  Ambulation reduced; Significant disease; Can't do hobbies/housework; intake normal or reduced; occasional assist; LOC full/confusion  ___50%  Mainly sit/lie; Extensive disease; Can't do any work; Considerable assist; intake normal or reduced; LOC full/confusion  __X_40%  Mainly in bed; Extensive disease; Mainly assist; intake normal or reduced; LOC full/confusion   ___30%  Bed Bound; Extensive disease; Total care; intake reduced; LOCfull/confusion  ___20%  Bed Bound; Extensive disease; Total care; intake minimal; Drowsy/coma  ___10%  Bed Bound; Extensive disease; Total care; Mouth care only; Drowsy/coma  ___0       Death      Plan      Palliative Interaction:    - Over 45 minutes spent in discussion with patient's father, Mairsel Can  - Answer questions regarding patient's medical conditions. - In particular educated as to congestive heart failure and what to expect as far as potential symptoms by the fact that his son has an ejection fraction of 10%. We discussed mainly issues about dyspnea and fatigue and how palliative care make continue to help with symptom management. - Talked about patient's poor nutritional status and that an AICD is not curative, and before placement of defibrillator, patient's nutritional status will have to improve. - Father would like to pursue avenue of NG tube and if that is unsuccessful to pursue PEG tube placement by general surgery. - Father would like hospital bed at home. I did contact primary service and asked them to place a DME order as patient would likely benefit from hospital bed at home owing to his CHF. - We discussed in depth regarding patient's code statuses. Father was very engaged in conversation and was very knowledgeable as he is CPR certified.   Father seemed to be leaning towards the DNR CCA code status, but stated he didn't want to make any changes officially at

## 2018-12-04 NOTE — PROGRESS NOTES
33.5 28.4 - 34.8 g/dL    RDW 19.2 (H) 11.8 - 14.4 %    Platelets 580 492 - 827 k/uL    MPV 10.3 8.1 - 13.5 fL    NRBC Automated 0.3 (H) 0.0 per 100 WBC    Differential Type NOT REPORTED     Seg Neutrophils 83 (H) 36 - 65 %    Lymphocytes 14 (L) 24 - 43 %    Monocytes 3 3 - 12 %    Eosinophils % 0 (L) 1 - 4 %    Basophils 0 0 - 2 %    Immature Granulocytes 0 0 %    Segs Absolute 5.94 1.50 - 8.10 k/uL    Absolute Lymph # 1.03 (L) 1.10 - 3.70 k/uL    Absolute Mono # 0.23 0.10 - 1.20 k/uL    Absolute Eos # <0.03 0.00 - 0.44 k/uL    Basophils # <0.03 0.00 - 0.20 k/uL    Absolute Immature Granulocyte <0.03 0.00 - 0.30 k/uL    WBC Morphology NOT REPORTED     RBC Morphology ANISOCYTOSIS PRESENT     Platelet Estimate NOT REPORTED    Basic Metabolic Panel   Result Value Ref Range    Glucose 111 (H) 70 - 99 mg/dL    BUN 15 6 - 20 mg/dL    CREATININE 0.68 (L) 0.70 - 1.20 mg/dL    Bun/Cre Ratio NOT REPORTED 9 - 20    Calcium 9.5 8.6 - 10.4 mg/dL    Sodium 139 135 - 144 mmol/L    Potassium 3.9 3.7 - 5.3 mmol/L    Chloride 98 98 - 107 mmol/L    CO2 17 (L) 20 - 31 mmol/L    Anion Gap 24 (H) 9 - 17 mmol/L    GFR Non-African American >60 >60 mL/min    GFR African American >60 >60 mL/min    GFR Comment          GFR Staging NOT REPORTED    Protime-INR   Result Value Ref Range    Protime 20.1 (H) 9.0 - 12.0 sec    INR 2.0    Levetiracetam Level   Result Value Ref Range    Levetiracetam Lvl 39 ug/mL   Troponin   Result Value Ref Range    Troponin T <0.03 <0.03 ng/mL    Troponin Interp         Brain Natriuretic Peptide   Result Value Ref Range    Pro-BNP 32,202 (H) <300 pg/mL    BNP Interpretation         Basic Metabolic Panel w/ Reflex to MG   Result Value Ref Range    Glucose 68 (L) 70 - 99 mg/dL    BUN 17 6 - 20 mg/dL    CREATININE 0.61 (L) 0.70 - 1.20 mg/dL    Bun/Cre Ratio NOT REPORTED 9 - 20    Calcium 9.4 8.6 - 10.4 mg/dL    Sodium 139 135 - 144 mmol/L    Potassium 4.4 3.7 - 5.3 mmol/L    Chloride 99 98 - 107 mmol/L    CO2 22 20 - Mutation       (NOTE)  [address] [place] [address] FOR DNA DIAGNOSTICS  MOLECULAR PATHOLOGY LABORATORY  [address] [place] [address], [place] [address] [place], [address]  Tel [phone] for Denia Waldron MD,   Aditi Chino MD  Specimen(s) Received:  Peripheral blood, [facility]  Clinical Information:  DVT    RESULTS:    MOLECULAR GENETIC DIAGNOSIS:     Negative for Factor V Leiden Mutation    INTERPRETATION:    The Factor V Leiden mutation (1691GA)  [c.1601G>A([id number])] was not detected in this study. This patient  may, however, still be at risk for venous thrombosis due to another  genetic predisposition including the Factor II (Prothrombin 16744JS)  mutation or either of the 5, 10-methylenetetrahydrofolate reductase  (MTHFR)  mutations (677T and S3456I)  Additional molecular testing is  available for these mutations in this laboratory. In addition, other  etiologies not studied in this assay may predispose this patient to  venous t hrombosis. Patients and their families undergoing molecular diagnostic testing  should understand that a normal result for such tests does not  preclude the unlikely possibility of genotyping errors occurring as a  result of rare genetic variants which can interfere with the analysis  or an unusual mutation of the gene(s) being studied. This molecular test has been approved for in vitro diagnostic use by  the U.[place] FDA. This test is used for clinical purposes. Pursuant to  the requirements of CLIA '88, this laboratory has established and  verified the test's accuracy and precision. It should not be regarded  as investigational or for research. This laboratory is certified  under the [address] (CLIA  '88) as qualified to perform high complexity clinical laboratory  testing.     METHODOLOGY: Factor V Leiden is a single point mutation in the Factor  V gene at nucleotide Morphology NOT REPORTED     RBC Morphology NOT REPORTED     Platelet Estimate NOT REPORTED     Immature Granulocytes 1 (H) 0 %    Seg Neutrophils 52 36 - 65 %    Lymphocytes 39 24 - 43 %    Monocytes 8 3 - 12 %    Eosinophils % 0 (L) 1 - 4 %    Basophils 0 0 - 2 %    Absolute Immature Granulocyte 0.05 0.00 - 0.30 k/uL    Segs Absolute 2.50 1.50 - 8.10 k/uL    Absolute Lymph # 1.87 1.10 - 3.70 k/uL    Absolute Mono # 0.38 0.10 - 1.20 k/uL    Absolute Eos # 0.00 0.00 - 0.44 k/uL    Basophils # 0.00 0.00 - 0.20 k/uL    Morphology ANISOCYTOSIS PRESENT    Basic Metabolic Panel w/ Reflex to MG   Result Value Ref Range    Glucose 87 70 - 99 mg/dL    BUN 22 (H) 6 - 20 mg/dL    CREATININE 0.78 0.70 - 1.20 mg/dL    Bun/Cre Ratio NOT REPORTED 9 - 20    Calcium 9.2 8.6 - 10.4 mg/dL    Sodium 148 (H) 135 - 144 mmol/L    Potassium 3.7 3.7 - 5.3 mmol/L    Chloride 111 (H) 98 - 107 mmol/L    CO2 21 20 - 31 mmol/L    Anion Gap 16 9 - 17 mmol/L    GFR Non-African American >60 >60 mL/min    GFR African American >60 >60 mL/min    GFR Comment          GFR Staging NOT REPORTED    MAGNESIUM   Result Value Ref Range    Magnesium 2.1 1.6 - 2.6 mg/dL   POCT troponin   Result Value Ref Range    POC Troponin I 0.03 0.00 - 0.10 ng/mL    POC Troponin Interp       The Troponin-I (POC) results cannot be compared to the Troponin-T results. POCT troponin   Result Value Ref Range    POC Troponin I 0.05 0.00 - 0.10 ng/mL    POC Troponin Interp       The Troponin-I (POC) results cannot be compared to the Troponin-T results.    POC Glucose Fingerstick   Result Value Ref Range    POC Glucose 82 75 - 110 mg/dL   POC Glucose Fingerstick   Result Value Ref Range    POC Glucose 68 (L) 75 - 110 mg/dL   EKG 12 Lead   Result Value Ref Range    Ventricular Rate 122 BPM    Atrial Rate 122 BPM    P-R Interval 138 ms    QRS Duration 72 ms    Q-T Interval 322 ms    QTc Calculation (Bazett) 458 ms    P Axis 55 degrees    R Axis -31 degrees    T Axis 109 degrees

## 2018-12-04 NOTE — PROGRESS NOTES
English and Lyn Altamiranosanchez voices understanding that we are not promoting any false hope, but patient may do better in his familiar home setting and with trial of medication to help stimulate appetite. - Would advise against Megace as patient is already hypercoagulable. Would consider steroid in the hospice setting, though it may worsen patient's CHF, may help improve fatigue and appetite. Educated father that with current full code status and current goals of care, likely primary service or cardiology would not be willing to start a medication like steroids owing to the risk of worsening current CHF. With goals of care discussion with hospice, hospice physician may consider starting in trying steroid with the acknowledgment of potential side effects, but with the goal of trying to improve patient's nutritional status as a means of comfort. - We educated Finleybertram Saab that he could revoke hospice at any point and that in fact some patients do improve and withdraw from hospice. Misti Macias will discuss with his mother who lives in [place], as she is his only support. We will continue to follow. Education/support to family  Discharge planning/helping to coordinate care  Communications with primary service  Providing support for coping/adaptation/distress of family  Fear of death or dying  Clarification of medical condition to patient and family  Code status clarified: Full Code  Code status clarified: [facility]  Code status clarified: [facility]  Recognizing, reflecting, and empathizing with family members' anticipatory grief  Principle Problem/Diagnosis:  Pulmonary embolus ([address]    Additional Assessments:    1- Symptom management/ pain control                           Pain Assessment:  Pain is controlled with current analgesics. Medication(s) being used: narcotic analgesics including morphine.                           Anxiety:  none                          Dyspnea:  acute dyspnea                          Fatigue:

## 2018-12-04 NOTE — PROGRESS NOTES
[address] [facility]  Progress Note    Date:   12/4/2018  Patient name:  Berenice Spivey  Date of admission:  11/26/2018  2:13 PM  MRN:   [id number]  YOB: 1990    SUBJECTIVE/Last 24 hours update:     Patient seen and examined at the bed side , no new acute events overnight. No new complaints. Discussed possible plan for repeat FlexiFlo NG tube placement with bridge today. Will follow gen surg recommendations. Notes from nursing staff and Consults had been reviewed, and the overnight progress had been checked with the nursing staff as well. HPI:   Per H&P:  The patient is a 33 y.o.  male with history of Systolic HF (EF 03%), Developmental delay and Blood clots (atreal thrombus contolled with coumadin) who presented with pre syncope. History provided by father who is his primary care giver - Pt does not have ability to speak (MR). States he has had loss of appetite for past few weeks, refusing nutritional shakes, not improved with supplements. The father states they had appt for echo today, upon arrival at the hospital, pt became very weak and his father had to have him sit on the ground, did not lose consciousness or have seizure like activity. The father brought the pt to the ED.  In June, pt diagnosed with CHF (EF 10%), following with [facility] cardiology - being evaluated for AICD implantation.  Pt has been following up with Coumadin clinic.      At ER, VSS except tachycardia, physical exam revealed muffled heart sounds, bilateral pedal edema up to the knees, palpable pedal pulses. CBC and trops were negative. INR and Keppra levels were therapeutic. EKG normal.  CTPE: blood clot in the lingual artery. Started on heparin drip. Pulm consulted, dc heparin, started lovenox.     REVIEW OF SYSTEMS:      CONSTITUTIONAL:  no fevers, no headcahes  EYES: negative for blury vision  HEENT: No headaches, No nasal congestion, no difficulty swallowing  RESPIRATORY:negative for dyspnea, no wheezing, no Cough  CARDIOVASCULAR: negative for chest pain, no palpitations  GASTROINTESTINAL: no nausea, no vomiting, no change in bowel habits, no abdominal pain   GENITOURINARY: negative for dysuria, no hematuria   MUSCULOSKELETAL: no joint pains, no muscle aches, no swelling of joints or extremities  NEUROLOGICAL: No  Weakness or numbness      PAST MEDICAL HISTORY:      has a past medical history of Acute systolic congestive heart failure ([facility] [address] Asthma; Development delay; and Seizures ([facility] [address] PAST SURGICAL HISTORY:      has a past surgical history that includes cyst incision and drainage and pr remv art clot iliac-pop,leg incis (Right, 11/28/2018). SOCIAL HISTORY:      reports that he has never smoked. He has never used smokeless tobacco. He reports that he does not drink alcohol or use drugs. FAMILY HISTORY:     family history includes Diabetes in his father; No Known Problems in his mother. HOME MEDICATIONS:      Prior to Admission medications    Medication Sig Start Date End Date Taking? Authorizing Provider   warfarin (COUMADIN) 2.5 MG tablet take 1/2 to 1 tablet once daily as directed BY CASE MANAGEMENT 10/23/18  Yes 164 Karla Small MD   warfarin (COUMADIN) 1 MG tablet Take 1/2 or 1 whole tablet (or as directed by Medication Management) by mouth once daily.  10/22/18  Yes 164 Karla Small MD   potassium chloride (MICRO-K) 10 MEQ extended release capsule Take 10 mEq by mouth daily   Yes Historical Provider, MD   [facility] 5-100 MG/5ML LIQD take 5 milliliters by mouth every 8 hours if needed for cough 10/4/18  Yes 164 Karla Small MD   bisoprolol (ZEBETA) 5 MG tablet Take 2.5 mg by mouth daily   Yes Historical Provider, MD   furosemide (LASIX) 20 MG tablet Take 20 mg by mouth daily as needed (swelling, shortness of breath)   Yes Historical Provider, MD   lisinopril (PRINIVIL;ZESTRIL) 5 MG tablet Take 1 tablet by mouth daily modulation, iterative reconstruction, and/or weight based adjustment of the mA/kV was utilized to reduce the radiation dose to as low as reasonably achievable. COMPARISON: Chest x-ray from today HISTORY: ORDERING SYSTEM PROVIDED HISTORY: r/o PE, PTX Syncope FINDINGS: Pulmonary Arteries: Pulmonary arteries are adequately opacified for evaluation. Possible filling defect segmental branch lingula. Main pulmonary artery is normal in caliber. Mediastinum: No evidence of mediastinal lymphadenopathy. The heart and pericardium demonstrate no acute abnormality. There is no acute abnormality of the thoracic aorta. Severe cardiomegaly. Minimal if any pericardial effusion. Lungs/Pleura: Moderate bilateral pleural effusions. Mild perihilar ground-glass opacities consistent with edema. Upper Abdomen: Possible perinephric emphysema and/or pneumobilia. Soft Tissues/Bones: No acute bone or soft tissue abnormality. Possible pulmonary embolism lingular segment pulmonary artery. Severe cardiomegaly, edema and effusions consistent with probable CHF. Possible perinephric emphysema or pneumobilia. Recommend follow-up CT of the abdomen and pelvis. RECOMMENDATIONS: D/w emergency physician dr Ashley Glover at 5:25 p.m.       Dup Lower Extremity Venous Bilateral    Result Date: 11/28/2018    [facility]  Vascular Lower Extremities DVT Study Procedure   Patient Name   Lambert Valencia  Date of Study           11/27/2018   Date of Birth  1990  Gender                  Male   Age            33 year(s)  Race                    Black   Room Number    1006        Height:                 66 inch, 167.64 cm   Corporate ID # [id number]  Weight:                 125 pounds, 56.7 kg   Patient Acct # [de-identified]   BSA:        1.64 m^2    BMI:       20.18 kg/m^2   MR #           [id number]     Sonographer             Sandrien Dumont   Accession #    [id number]   Interpreting Physician  [facility]   Referring                  Referring !                                ! +---------------------------+------+------+--------------------------------+ ! Popliteal                  !Phasic!      !                                ! +---------------------------+------+------+--------------------------------+ Left Lower Extremities DVT Study Measurements Left 2D Measurements +------------------------------------+----------+---------------+----------+ ! Location                            ! Visualized! Compressibility! Thrombosis! +------------------------------------+----------+---------------+----------+ ! Common Femoral                      !Yes       ! Yes            ! None      ! +------------------------------------+----------+---------------+----------+ ! Prox Femoral                        !Yes       ! Yes            ! None      ! +------------------------------------+----------+---------------+----------+ ! Mid Femoral                         !Yes       ! Yes            ! None      ! +------------------------------------+----------+---------------+----------+ ! Dist Femoral                        !Yes       ! Yes            ! None      ! +------------------------------------+----------+---------------+----------+ ! Popliteal                           !Yes       ! Yes            ! None      ! +------------------------------------+----------+---------------+----------+ ! Sapheno Femoral Junction            ! Yes       ! Yes            ! None      ! +------------------------------------+----------+---------------+----------+ ! PTV                                 ! Yes       ! Yes            ! None      ! +------------------------------------+----------+---------------+----------+ ! Peroneal                            !Yes       ! Yes            ! None      ! +------------------------------------+----------+---------------+----------+ ! Gastroc                             ! Yes       ! Yes            ! None      ! +------------------------------------+----------+---------------+----------+ OF PROCEDURE: Universal protocol was observed. Under fluoroscopic guidance, through the right nostril, a 8 [place] feeding tube was negotiated into the stomach. The tip of the catheter was manipulated into the distal stomach. Air instillation confirmed satisfactory tip position. The catheter was secured appropriately. The patient tolerated the procedure well. FINDINGS: Enteric tip terminating in the distal stomach     Fluoroscopic guided placement of an 8 [place] feeding tube terminating in the distal stomach. Ready for use.        Current Facility-Administered Medications   Medication Dose Route Frequency Provider Last Rate Last Dose    dextrose 5 % solution   Intravenous Continuous Royal Juarez MD        famotidine (PEPCID) injection 20 mg  20 mg Intravenous BID Igor Metzger MD   20 mg at 12/03/18 2212    morphine injection 2 mg  2 mg Intravenous Q2H PRN Igor Metzger MD   2 mg at 12/02/18 2135    Or    morphine (PF) injection 4 mg  4 mg Intravenous Q2H PRN Charles Mg MD   4 mg at 12/04/18 0300    LORazepam (ATIVAN) injection 0.5 mg  0.5 mg Intravenous Nightly PRN Subhash Dsouza MD        ibuprofen (ADVIL;MOTRIN) 100 MG/5ML suspension 400 mg  400 mg Oral Q6H PRN Glenda Waller MD   400 mg at 12/02/18 1039    bisoprolol (ZEBETA) tablet 2.5 mg  2.5 mg Oral Daily Nirmala Patel MD   2.5 mg at 12/03/18 1134    apixaban (ELIQUIS) tablet 5 mg  5 mg Oral BID Daphene Joe, DO   5 mg at 12/03/18 2212    sodium chloride flush 0.9 % injection 10 mL  10 mL Intravenous 2 times per day Mariama Etienne MD   10 mL at 12/03/18 2213    sodium chloride flush 0.9 % injection 10 mL  10 mL Intravenous PRN Mariama Etienne MD        albuterol (PROVENTIL) nebulizer solution 2.5 mg  2.5 mg Nebulization Q6H PRN Crissy Macias MD        levETIRAcetam (KEPPRA) 100 MG/ML solution 567 mg  10 mg/kg Oral BID Crissy Macias MD   567 mg at 12/03/18 2212    lisinopril (PRINIVIL;ZESTRIL) tablet 5 mg

## 2018-12-05 NOTE — PROGRESS NOTES
Palliative Care Progress Note    NAME:  Magaly Perkins  MEDICAL RECORD NUMBER:  [id number]  AGE: 33 y.o. GENDER: male  : 1990  TODAY'S DATE:  2018    Reason for Consult:  symptom management and goals of care  History of Present Illness     The patient is a 33 y.o. Non-/non  male who presents with Loss of Consciousness (Pt brought to the ED by EMS with father at bedside. Father states that pt was coming to this facility today for an echo but states that pt passed out before they were able to make it in for the echo. Father states that pt has been having weakness for a couple of days now and that pt has lower extremity swelling. Father states that pt has been c/o headaches for the past 3 days, father has been giving pt aspirin for the pain) and Fatigue      Referred to Palliative Care by  [x] Physician   [] Nursing  [] Family Request   [] Other:       He was admitted to the [facility] medicine service for Pulmonary embolism on long-term anticoagulation therapy ([facility] [address] [I26.99, Z79.01]. His hospital course has been associated with Pulmonary embolus ([address] The patient has a complicated medical history and has been hospitalized since 2018  2:13 PM.  Patient with history of MRDD/autism. Patient with systolic CHF with echo from 2018 revealing an ejection fraction of 10%. Cardiology and EP are following and plan is for AICD placement if patient's nutritional status improves. Patient diagnosed with protein calorie deficiency by dietitian. Plan is for IR to place a Flexiflo NG tube and for outpatient follow-up with Gen. surgery for possible PEG tube placement. Patient with pre-albumin low at 13.3. Albumin from 2018 is low at 3.0. Vascular surgery had been following as patient was found to have a right common femoral artery occlusion and underwent femoral thrombectomy. Vascular surgery recommending lifelong anticoagulation.   Patient switched to Eliquis 5 mg twice a day from home medication of warfarin. Patient also found to have pulmonary embolism and was assessed by pulmonology. OVERNIGHT EVENTS:    Patient's IV access compromised. Primary team potentially planning for PICC line placement. Patient is to attempt NG placement again, this time with bridle. BP (!) 114/96   Pulse 100   Temp 97.7 °F (36.5 °C) (Axillary)   Resp 18   Ht 5' 6\" (1.676 m)   Wt 119 lb (54 kg)   SpO2 98%   BMI 19.21 kg/m²     Assessment        REVIEW OF SYSTEMS    [x]   UNABLE TO OBTAIN:  Somnolent and autistic. Review of systems obtained through surrogate caregiver patient's father.     Constitutional:  []   Chills   [x]  Fatigue   []  Fevers   []  Malaise   [x]  Weight loss   [] Other:     Respiratory:   []  Cough    [x]  Shortness of breath    []  Chest pain    [] Other:     Cardiovascular:   []  Chest pain  []  Dyspnea    []  Exertional chest pressure/discomfort     [x] Fatigue      []  Palpitations    []  Syncope   [] Other:     Gastrointestinal:   []  Abdominal pain   []  Constipation    []  Diarrhea    []   Dysphagia   []  Reflux             []  Vomiting   [] Other:     Genitourinary:  []  Dysuria     []  Frequency   []  Hematuria   [] Nocturia   []  Urinary incontinence   [] Other:     Musculoskeletal:   [] Back pain    [x]  Muscle weakness   []  Myalgias    []  Neck pain   []  Stiff joints   []  Other:     Behavioral/Psych:   [] Anxiety    []  Depression     []  Mood swings   [] Other:     PHYSICAL ASSESSMENT:     General: []  Oriented x3      [] well appearing      [] Intubated      [x] ill appearing      [] Other:    Mental Status: [] normal mental status exam      [x] drowsy      [] Confused      [] Other:     Cardiovascular: []  Regular rate/rhythm      [] Arrhythmia      [x] Other:  Tachycardic    Chest: [x] Effort normal      [] lungs clear      [] respiratory distress      [] Tachypnea      []  Other:    Abdomen: [x] Soft/non-tender      []  Normal appearance      [] Distended [] Ascites      [] Other:    Neurological: [] Normal Speech      [] Normal Sensation      []  Deficits present:      Extremity:  [] normal skin color/temp      [] clubbing/cyanosis      []  No edema      [x] Other: Right upper extremity swelling greatly increased compared to left. Palliative Performance Scale:  ___60%  Ambulation reduced; Significant disease; Can't do hobbies/housework; intake normal or reduced; occasional assist; LOC full/confusion  ___50%  Mainly sit/lie; Extensive disease; Can't do any work; Considerable assist; intake normal or reduced; LOC full/confusion  __X_40%  Mainly in bed; Extensive disease; Mainly assist; intake normal or reduced; LOC full/confusion   ___30%  Bed Bound; Extensive disease; Total care; intake reduced; LOCfull/confusion  ___20%  Bed Bound; Extensive disease; Total care; intake minimal; Drowsy/coma  ___10%  Bed Bound; Extensive disease; Total care; Mouth care only; Drowsy/coma  ___0       Death      Plan      Palliative Interaction:    - Had lengthy discussion with patient's father regarding goals of care and code status. Abimael Hewitt had discussed with his mother over the phone that they would like to do everything possible as far as aggressive medical care and resuscitative measures. Father states that he would like patient to remain full code and that, knowing the risks that his son would possibly endure as a result of CPR, and the likelihood that the resuscitation would not be as effective owing to his low ejection fraction.  - Follow like to proceed with NG tube placement and to evaluate for possible future PEG placement. I did review risks and benefits of PEG tube placement and the possibility that his son may not adequately regain nutritional status even with PEG placement. Albert Lacy gave his verbal understanding.  - I suggested that in the outpatient setting appointments can be made for second opinion with general surgery.   Possible that if family hears a

## 2018-12-05 NOTE — PROGRESS NOTES
popliteal and tibial veins  demonstrate normal compressibility  and augmentation. Normal compressibility of the great  saphenous vein. Normal compressibility of the small  saphenous vein. Risk Factors History +---------+----------+-----------------------------------------------------+ ! Diagnosis! Date      ! Comments                                             ! +---------+----------+-----------------------------------------------------+ ! CHF      ! !                                                     ! +---------+----------+-----------------------------------------------------+ ! Other    ! ! Atrial thrombus seen on echo 8-18                    ! +---------+----------+-----------------------------------------------------+ ! Other    ! ! Mental retardation                                   ! +---------+----------+-----------------------------------------------------+ ! Other    !11/26/2018! Pulmonary embolism                                   ! +---------+----------+-----------------------------------------------------+ Velocities are measured in cm/s ; Diameters are measured in cm Right Lower Extremities DVT Study Measurements Right 2D Measurements +------------------------------------+----------+---------------+----------+ ! Location                            ! Visualized! Compressibility! Thrombosis! +------------------------------------+----------+---------------+----------+ ! Common Femoral                      !Yes       ! Yes            ! None      ! +------------------------------------+----------+---------------+----------+ ! Prox Femoral                        !Yes       ! Yes            ! None      ! +------------------------------------+----------+---------------+----------+ ! Mid Femoral                         !Yes       ! Yes            ! None      ! +------------------------------------+----------+---------------+----------+ ! Dist Femoral                        !Yes       ! Yes ! +---------------------------+------+------+--------------------------------+ ! Popliteal                  !Phasic!      !                                ! +---------------------------+------+------+--------------------------------+ Left Lower Extremities DVT Study Measurements Left 2D Measurements +------------------------------------+----------+---------------+----------+ ! Location                            ! Visualized! Compressibility! Thrombosis! +------------------------------------+----------+---------------+----------+ ! Common Femoral                      !Yes       ! Yes            ! None      ! +------------------------------------+----------+---------------+----------+ ! Prox Femoral                        !Yes       ! Yes            ! None      ! +------------------------------------+----------+---------------+----------+ ! Mid Femoral                         !Yes       ! Yes            ! None      ! +------------------------------------+----------+---------------+----------+ ! Dist Femoral                        !Yes       ! Yes            ! None      ! +------------------------------------+----------+---------------+----------+ ! Popliteal                           !Yes       ! Yes            ! None      ! +------------------------------------+----------+---------------+----------+ ! Sapheno Femoral Junction            ! Yes       ! Yes            ! None      ! +------------------------------------+----------+---------------+----------+ ! PTV                                 ! Yes       ! Yes            ! None      ! +------------------------------------+----------+---------------+----------+ ! Peroneal                            !Yes       ! Yes            ! None      ! +------------------------------------+----------+---------------+----------+ ! Gastroc                             ! Yes       ! Yes            ! None      ! +------------------------------------+----------+---------------+----------+ ! GSV Thigh !Yes       !Yes            ! None      ! +------------------------------------+----------+---------------+----------+ ! GSV Knee                            ! Yes       ! Yes            ! None      ! +------------------------------------+----------+---------------+----------+ ! GSV Ankle                           ! Yes       ! Yes            ! None      ! +------------------------------------+----------+---------------+----------+ ! SSV                                 ! Yes       ! Yes            ! None      ! +------------------------------------+----------+---------------+----------+ Left Doppler Measurements +---------------------------+------+------+--------------------------------+ ! Location                   ! Signal!Reflux! Reflux (msec)                   ! +---------------------------+------+------+--------------------------------+ ! Common Femoral             !Phasic!      !                                ! +---------------------------+------+------+--------------------------------+ ! Prox Femoral               !Phasic!      !                                ! +---------------------------+------+------+--------------------------------+ ! Popliteal                  !Phasic!      !                                ! +---------------------------+------+------+--------------------------------+    Ir Place Ng Tube By Dr Agnieszka Linares    Result Date: 12/4/2018  PROCEDURE: XR PLACE NASOGASTRIC TUBE PHYS 12/3/2018 HISTORY: ORDERING SYSTEM PROVIDED HISTORY: Poor PO intake, patient MRDD/autism not cooperating with bedside placement TECHNOLOGIST PROVIDED HISTORY: Flexiflo Discussed with Dr. Vidya Antonio, IR will perform procedure possibly today otherwise tomorrow a.m.  Reason for exam:->Poor PO intake, patient MRDD/autism not cooperating with bedside placement TECHNIQUE: Fluoroscopy was utilized CONTRAST: None SEDATION: None FLUOROSCOPY DOSE AND TYPE OR TIME AND EXPOSURES: 0.8 minute; 219 cGy cm squared DESCRIPTION OF PROCEDURE: systolic CHF LVEF 19%  - Continue current medications. Not candidate for AICD placement at this time due to severe malnutrition   - Patient to f/u with EP in outpatient setting regarding AICD placement      Seizure disorder   - Keppra 567 mg BID        Discussed care plan with nurse after getting her input. Above plan discussed with the father in room, who agreed to the above plan     DVT prophylaxis: already anticoagulated with eliquis   GI prophylaxis: reason for no GI prophylaxis: not indicated     Plan will be discussed with the attending, Dr Cecilia Cabrales MD  Family Medicine Resident  12/5/2018 8:21 AM            Please note that this chart was generated using voice recognition Dragon dictation software.   Although every effort was made to ensure the accuracy of this automated transcription, some errors in transcription may have occurred

## 2018-12-06 NOTE — PROGRESS NOTES
[address] [facility]  Progress Note    Date:   12/6/2018  Patient name:  Sandhya Urias  Date of admission:  11/26/2018  2:13 PM  MRN:   [id number]  YOB: 1990    SUBJECTIVE/Last 24 hours update:     Patient seen and examined at the bed side , no new acute events overnight. Father at bedside. No complaints at this time. Notes from nursing staff and Consults had been reviewed, and the overnight progress had been checked with the nursing staff as well. HPI:   Per H&P:  The patient is a 33 y.o.  male with history of Systolic HF (EF 97%), Developmental delay and Blood clots (atreal thrombus contolled with coumadin) who presented with pre syncope. History provided by father who is his primary care giver - Pt does not have ability to speak (MR). States he has had loss of appetite for past few weeks, refusing nutritional shakes, not improved with supplements. The father states they had appt for echo today, upon arrival at the hospital, pt became very weak and his father had to have him sit on the ground, did not lose consciousness or have seizure like activity. The father brought the pt to the ED.  In June, pt diagnosed with CHF (EF 10%), following with [facility] cardiology - being evaluated for AICD implantation.  Pt has been following up with Coumadin clinic.      At ER, VSS except tachycardia, physical exam revealed muffled heart sounds, bilateral pedal edema up to the knees, palpable pedal pulses. CBC and trops were negative. INR and Keppra levels were therapeutic. EKG normal.  CTPE: blood clot in the lingual artery. Started on heparin drip. Pulm consulted, dc heparin, started lovenox.     REVIEW OF SYSTEMS:      CONSTITUTIONAL:  no fevers, no headcahes  EYES: negative for blury vision  HEENT: No headaches, No nasal congestion, no difficulty swallowing  RESPIRATORY:negative for dyspnea, no wheezing, no Cough  CARDIOVASCULAR: negative for chest pain, no femoral, femoral,  popliteal and tibial veins  demonstrate normal compressibility  and augmentation. Normal compressibility of the great  saphenous vein. Normal compressibility of the small  saphenous vein. Risk Factors History +---------+----------+-----------------------------------------------------+ ! Diagnosis! Date      ! Comments                                             ! +---------+----------+-----------------------------------------------------+ ! CHF      ! !                                                     ! +---------+----------+-----------------------------------------------------+ ! Other    ! ! Atrial thrombus seen on echo 8-18                    ! +---------+----------+-----------------------------------------------------+ ! Other    ! ! Mental retardation                                   ! +---------+----------+-----------------------------------------------------+ ! Other    !11/26/2018! Pulmonary embolism                                   ! +---------+----------+-----------------------------------------------------+ Velocities are measured in cm/s ; Diameters are measured in cm Right Lower Extremities DVT Study Measurements Right 2D Measurements +------------------------------------+----------+---------------+----------+ ! Location                            ! Visualized! Compressibility! Thrombosis! +------------------------------------+----------+---------------+----------+ ! Common Femoral                      !Yes       ! Yes            ! None      ! +------------------------------------+----------+---------------+----------+ ! Prox Femoral                        !Yes       ! Yes            ! None      ! +------------------------------------+----------+---------------+----------+ ! Mid Femoral                         !Yes       ! Yes            ! None      ! +------------------------------------+----------+---------------+----------+ ! Dist Femoral                        !Yes OF PROCEDURE: Universal protocol was observed. Under fluoroscopic guidance, through the right nostril, a 8 Western Josey feeding tube was negotiated into the stomach. The tip of the catheter was manipulated into the distal stomach. Air instillation confirmed satisfactory tip position. The catheter was secured appropriately. The patient tolerated the procedure well. FINDINGS: Enteric tip terminating in the distal stomach     Fluoroscopic guided placement of an 8 Jamaican feeding tube terminating in the distal stomach. Ready for use.        Current Facility-Administered Medications   Medication Dose Route Frequency Provider Last Rate Last Dose    HYDROmorphone (DILAUDID) tablet 1 mg  1 mg Oral Q4H PRN Kristie Cee MD   1 mg at 12/05/18 0416    LORazepam (ATIVAN) tablet 1 mg  1 mg Oral Q4H PRN Kristie Cee MD   1 mg at 12/05/18 1453    promethazine (PHENERGAN) injection 12.5 mg  12.5 mg Intramuscular Q4H PRN Charles Mg MD   12.5 mg at 12/05/18 1541    sodium chloride flush 0.9 % injection 10 mL  10 mL Intravenous 2 times per day Pelon Bower MD        sodium chloride flush 0.9 % injection 10 mL  10 mL Intravenous PRN Pelon Bower MD        sodium chloride flush 0.9 % injection 10 mL  10 mL Intravenous 2 times per day Pelon Bower MD        sodium chloride flush 0.9 % injection 10 mL  10 mL Intravenous PRN Pelon Bower MD        dextrose 5 % solution   Intravenous Continuous Joshsyed Kahn MD 75 mL/hr at 12/06/18 0510      morphine injection 2 mg  2 mg Intravenous Q2H PRN Josh Kahn MD   2 mg at 12/06/18 0230    Or    morphine (PF) injection 4 mg  4 mg Intravenous Q2H PRN Josh Kahn MD   4 mg at 12/06/18 0528    LORazepam (ATIVAN) injection 0.5 mg  0.5 mg Intravenous Q6H PRN Josh Kahn MD        famotidine (PEPCID) injection 20 mg  20 mg Intravenous BID Kristie Cee MD        levETIRAcetam (KEPPRA) 567 mg in sodium chloride 0.9 % 100 mL IVPB  567 mg Intravenous [address] Carmen MD   567 mg at 12/06/18 0620    ibuprofen (ADVIL;MOTRIN) 100 MG/5ML suspension 400 mg  400 mg Oral Q6H PRN Valorie Garzon MD   400 mg at 12/02/18 1039    bisoprolol (ZEBETA) tablet 2.5 mg  2.5 mg Oral Daily Kaz Brooks MD   2.5 mg at 12/05/18 1057    apixaban (ELIQUIS) tablet 5 mg  5 mg Oral BID Benson Jump, DO   5 mg at 12/05/18 2237    sodium chloride flush 0.9 % injection 10 mL  10 mL Intravenous 2 times per day Ranulfo Horne MD   10 mL at 12/03/18 2213    sodium chloride flush 0.9 % injection 10 mL  10 mL Intravenous PRN Ranulfo Horne MD        albuterol (PROVENTIL) nebulizer solution 2.5 mg  2.5 mg Nebulization Q6H PRN Francee Scheuermann, MD        lisinopril (PRINIVIL;ZESTRIL) tablet 5 mg  5 mg Oral Daily Francee Scheuermann, MD   5 mg at 12/05/18 1055    sodium chloride flush 0.9 % injection 10 mL  10 mL Intravenous 2 times per day Francee Scheuermann, MD   10 mL at 12/04/18 1041    sodium chloride flush 0.9 % injection 10 mL  10 mL Intravenous PRN Francee Scheuermann, MD        magnesium hydroxide (MILK OF MAGNESIA) 400 MG/5ML suspension 30 mL  30 mL Oral Daily PRN Francee Scheuermann, MD        ondansetron (ZOFRAN) injection 4 mg  4 mg Intravenous Q6H PRN Francee Scheuermann, MD   4 mg at 11/29/18 0128    potassium chloride (KLOR-CON M) extended release tablet 40 mEq  40 mEq Oral PRN Francee Scheuermann, MD        Or    potassium chloride 20 MEQ/15ML (10%) oral solution 40 mEq  40 mEq Oral PRN Francee Scheuermann, MD        Or    potassium chloride 10 mEq/100 mL IVPB (Peripheral Line)  10 mEq Intravenous PRN Francee Scheuermann, MD        acetaminophen (TYLENOL) 160 MG/5ML solution 650 mg  650 mg Oral Q4H PRN Francee Scheuermann, MD   650 mg at 12/01/18 1823       ASSESSMENT:     Principal Problem:    Pulmonary embolus [facility])  Active Problems:    Seizure disorder ([address]    Mental retardation    Atrial thrombus without antecedent myocardial infarction    Chronic systolic congestive heart

## 2018-12-06 NOTE — PLAN OF CARE
Problem: Nutrition  Goal: Optimal nutrition therapy  Outcome: Ongoing  Nutrition Problem: Severe malnutrition  Intervention: Food and/or Nutrient Delivery: Continue current diet, Start Parenteral Nutrition  Nutritional Goals: meet >75% via PN

## 2018-12-07 NOTE — PROGRESS NOTES
Physical Therapy  DATE: 2018    NAME: Jose Alfredo Porras  MRN: [id number]   : 1990    Patient not seen this date for Physical Therapy due to:  [] Blood transfusion in progress  [] Hemodialysis  []  Patient Declined  [] Spine Precautions   [] Strict Bedrest  [] Surgery/ Procedure  [] Testing      [x] Other  - Hospice appropriate. Pt no longer appropriate for PT.        [] PT being discontinued at this time. Patient independent. No further needs. [] PT being discontinued at this time as the patient has been transferred to palliative care. No further needs.     Lenetta Means, PTA

## 2018-12-07 NOTE — PROGRESS NOTES
home medication of warfarin. Patient also found to have pulmonary embolism and was assessed by pulmonology. OVERNIGHT EVENTS:    After TPN commenced via patient's PICC line, patient developing pleural effusion and dyspnea as a result. Checks x-ray revealing marked pulmonary edema and cardiomegaly. BP (!) 152/84   Pulse 104   Temp 97.3 °F (36.3 °C) (Axillary)   Resp 20   Ht 5' 6\" (1.676 m)   Wt 119 lb (54 kg)   SpO2 96%   BMI 19.21 kg/m²     Assessment        REVIEW OF SYSTEMS    [x]   UNABLE TO OBTAIN:  Somnolent and autistic. Review of systems obtained through surrogate caregiver patient's father.     Constitutional:  []   Chills   [x]  Fatigue   []  Fevers   []  Malaise   [x]  Weight loss   [] Other:     Respiratory:   []  Cough    [x]  Shortness of breath    []  Chest pain    [] Other:     Cardiovascular:   []  Chest pain  []  Dyspnea    []  Exertional chest pressure/discomfort     [x] Fatigue      []  Palpitations    []  Syncope   [] Other:     Gastrointestinal:   []  Abdominal pain   []  Constipation    []  Diarrhea    []   Dysphagia   []  Reflux             []  Vomiting   [] Other:     Genitourinary:  []  Dysuria     []  Frequency   []  Hematuria   [] Nocturia   []  Urinary incontinence   [] Other:     Musculoskeletal:   [] Back pain    [x]  Muscle weakness   []  Myalgias    []  Neck pain   []  Stiff joints   []  Other:     Behavioral/Psych:   [] Anxiety    []  Depression     []  Mood swings   [] Other:     PHYSICAL ASSESSMENT:     General: []  Oriented x3      [] well appearing      [] Intubated      [x] ill appearing      [] Other:    Mental Status: [] normal mental status exam      [x] drowsy      [] Confused      [] Other:     Cardiovascular: []  Regular rate/rhythm      [] Arrhythmia      [x] Other:  Tachycardic    Chest: [] Effort normal      [] lungs clear      [x] respiratory distress      [] Tachypnea      [x]  Other: Significant crackles    Abdomen: [x] Soft/non-tender      []  Normal appearance      [] Distended      [] Ascites      [] Other:    Neurological: [] Normal Speech      [] Normal Sensation      []  Deficits present:      Extremity:  [] normal skin color/temp      [] clubbing/cyanosis      []  No edema      [x] Other: Right upper extremity swelling greatly increased compared to left. Palliative Performance Scale:  ___60%  Ambulation reduced; Significant disease; Can't do hobbies/housework; intake normal or reduced; occasional assist; LOC full/confusion  ___50%  Mainly sit/lie; Extensive disease; Can't do any work; Considerable assist; intake normal or reduced; LOC full/confusion  __X_40%  Mainly in bed; Extensive disease; Mainly assist; intake normal or reduced; LOC full/confusion   ___30%  Bed Bound; Extensive disease; Total care; intake reduced; LOCfull/confusion  ___20%  Bed Bound; Extensive disease; Total care; intake minimal; Drowsy/coma  ___10%  Bed Bound; Extensive disease; Total care; Mouth care only; Drowsy/coma  ___0       Death    Xr Chest Portable    Result Date: 12/7/2018  EXAMINATION: SINGLE XRAY VIEW OF THE CHEST 12/7/2018 8:47 am COMPARISON: November 26, 2018 HISTORY: ORDERING SYSTEM PROVIDED HISTORY: cough r/o PNA TECHNOLOGIST PROVIDED HISTORY: cough r/o PNA FINDINGS: Right upper extremity PICC in good position. Cardiac monitoring leads overlie the chest.  Cardiomegaly with new pulmonary edema. Previous exam showed subpulmonic effusion on the left which appears to have resolved. 1. Marked pulmonary edema. 2. Improved left-sided sub pulmonic effusion. 3. Marked cardiomegaly. Plan      Palliative Interaction:    - The discussion had with father again today. Over 30 minutes spent. - Multiple communications with primary service as well.   - Recommend stopping TPN as patient's heart unable to tolerate.  - Still recommend a DNR CC are DNR CCA code status, especially to forego chest compressions but father is not agreeable at this point.  - Father would like

## 2018-12-07 NOTE — PROGRESS NOTES
congestive heart failure (HCC)    Protein-calorie malnutrition (HCC)    Congestive heart failure (HCC)  Resolved Problems:    * No resolved hospital problems. *      ASSESSMENT / PLAN    1. NICMP     - continue medical therapy     2. Prior LV apical thrombus     - was on Coumadin     3. Acute leg ischemia from clot embolus     - s/p surgical resection by Vascular surgery     - on Hep gtt     - is on eliquis    4. EP felt patient was not candidate for ICD please see their note    5. Acute on chronic systolic CHF    Patient volume overloaded, but diuresis will be difficult because of high sodium and low K and no oral intake.  Can try IV lasix, but would also have renal see patient to assist      Electronically signed by Renetta Vargas MD on 12/7/2018 at 12:02 PM

## 2018-12-07 NOTE — PROGRESS NOTES
Patient had 10 beat run of Vtach while sleeping. /90 HR 99. Paged Family Medicine resident to notify.

## 2018-12-07 NOTE — PROGRESS NOTES
mouth daily   Yes Historical Provider, MD   furosemide (LASIX) 20 MG tablet Take 20 mg by mouth daily as needed (swelling, shortness of breath)   Yes Historical Provider, MD   lisinopril (PRINIVIL;ZESTRIL) 5 MG tablet Take 1 tablet by mouth daily 8/20/18  Yes Ellyn Zamorano MD   Acetaminophen (TYLENOL) 167 MG/5ML LIQD Take 5 mLs by mouth every 6 hours as needed for Pain or Fever Take as 7/9/18  Yes Rory Chua MD   Dextromethorphan-guaiFENesin ([address] FAST-MAX DM MAX) 5-100 MG/5ML LIQD Take 5 mLs by mouth every 12 hours as needed (fever or pain) 7/9/18  Yes Rory Chua MD   carvedilol (COREG) 3.125 MG tablet Take 3.125 mg by mouth 2 times daily (with meals)   Yes Historical Provider, MD   cetirizine (ZYRTEC) 1 MG/ML SOLN syrup Take 10 mLs by mouth daily 6/11/18  Yes Rory Chua MD   guaiFENesin (MUCINEX) 600 MG extended release tablet Take 1 tablet by mouth 2 times daily 5/24/18  Yes Rory Chua MD   albuterol (PROVENTIL) (2.5 MG/3ML) 0.083% nebulizer solution Take 3 mLs by nebulization every 6 hours as needed for Wheezing 5/24/18  Yes Rory Chua MD   levETIRAcetam (KEPPRA) 100 MG/ML solution Take 10 mg/kg by mouth 2 times daily   Yes Historical Provider, MD   Incontinence Supply Disposable (PROCARE ADULT BRIEFS LARGE) MISC Use as directed 1/22/18  Yes Rory Chua MD   RA FEVER REDUCER/PAIN RELIEVER 160 MG/5ML suspension take 5 milliliters by mouth every 6 hours if needed for pain or fever 11/27/18   Rory Chua MD       ALLERGIES:     Seasonal      OBJECTIVE:       Vitals:    12/06/18 0931 12/06/18 2030 12/06/18 2315 12/07/18 0114   BP: (!) 123/106 (!) 127/105 (!) 114/90 (!) 114/90   Pulse:  99 101 99   Resp:  18  20   Temp:  97.9 °F (36.6 °C)     TempSrc:  Axillary     SpO2:  100% 91% 96%   Weight:       Height:             Intake/Output Summary (Last 24 hours) at 12/07/18 0817  Last data filed at 12/07/18 0651   Gross per 24 hour   Intake             2267 ml   Output 0 ml   Net             2267 ml       PHYSICAL EXAM:  General Appearance  Alert , awake , not in acute distress  HEENT - Head is normocephalic, atraumatic. Lungs - Bilateral equal air entry , no wheezes, rales or rhonchi, aeration good, cough with upper airway congestion  Cardiovascular - Heart sounds are normal.  Regular rhythm, normal rate without murmur, gallop or rub.   Abdomen - Soft, nontender, nondistended, no masses or organomegaly  Neurologic - There are no new focal motor or sensory deficits  Skin - No bruising or bleeding on exposed skin area  Extremities - No cyanosis, clubbing or edema, mild swelling of right upper extremity, right arm PICC w/TPN    DIAGNOSTICS:     Laboratory Testing:    Recent Results (from the past 24 hour(s))   POC Glucose Fingerstick    Collection Time: 12/07/18 12:40 AM   Result Value Ref Range    POC Glucose 109 75 - 110 mg/dL   Comprehensive Metabolic Panel    Collection Time: 12/07/18  6:51 AM   Result Value Ref Range    Glucose 142 (H) 70 - 99 mg/dL    BUN 24 (H) 6 - 20 mg/dL    CREATININE 0.58 (L) 0.70 - 1.20 mg/dL    Bun/Cre Ratio NOT REPORTED 9 - 20    Calcium 8.2 (L) 8.6 - 10.4 mg/dL    Sodium 144 135 - 144 mmol/L    Potassium 4.3 3.7 - 5.3 mmol/L    Chloride 111 (H) 98 - 107 mmol/L    CO2 21 20 - 31 mmol/L    Anion Gap 12 9 - 17 mmol/L    Alkaline Phosphatase 71 40 - 129 U/L    ALT <5 (L) 5 - 41 U/L    AST <5 <40 U/L    Total Bilirubin 1.35 (H) 0.3 - 1.2 mg/dL    Total Protein 6.0 (L) 6.4 - 8.3 g/dL    Alb 2.3 (L) 3.5 - 5.2 g/dL    Albumin/Globulin Ratio 0.6 (L) 1.0 - 2.5    GFR Non-African American >60 >60 mL/min    GFR African American >60 >60 mL/min    GFR Comment          GFR Staging NOT REPORTED    Phosphorus    Collection Time: 12/07/18  6:51 AM   Result Value Ref Range    Phosphorus 2.7 2.5 - 4.5 mg/dL   Triglycerides    Collection Time: 12/07/18  6:51 AM   Result Value Ref Range    Triglycerides 459 (H) <150 mg/dL         Imaging/Diagonstics:  Ct Abdomen Pelvis Wo Contrast Additional Contrast? Radiologist Recommendation    Result Date: 11/26/2018  EXAMINATION: CT OF THE ABDOMEN AND PELVIS WITHOUT CONTRAST 11/26/2018 6:53 pm TECHNIQUE: CT of the abdomen and pelvis was performed without the administration of intravenous contrast. Multiplanar reformatted images are provided for review. Dose modulation, iterative reconstruction, and/or weight based adjustment of the mA/kV was utilized to reduce the radiation dose to as low as reasonably achievable. COMPARISON: None. HISTORY: ORDERING SYSTEM PROVIDED HISTORY: follow up to prior chest CT with possible pneumobillia TECHNOLOGIST PROVIDED HISTORY: FINDINGS: Lower Chest: Moderate bilateral pleural effusions. Severe cardiomegaly. Mild edema. Organs: There is diffuse subcutaneous edema. The liver, spleen, pancreas, and adrenals appear normal.  Gallbladder normal. Kidneys appear normal. Hyperdense contrast in the bladder. GI/Bowel: The stomache,small bowel, and colon appear normal. Oral contrast is noted in the stomach in small bowel. There is a loop of colon in Morison's pouch but no evidence of. Nephric emphysema or pneumobilia. Pelvis: Normal Peritoneum/Retroperitoneum: The abdominal aorta and iliac arteries are normal in caliber. There is no pathologic adenopathy. Bones/Soft Tissues: Normal     Moderate cardiomegaly and effusions. No acute disease such is perinephric gas or pneumobilia. Ct Head Wo Contrast    Result Date: 11/27/2018  EXAMINATION: CT OF THE HEAD WITHOUT CONTRAST  11/27/2018 3:42 pm TECHNIQUE: CT of the head was performed without the administration of intravenous contrast. Dose modulation, iterative reconstruction, and/or weight based adjustment of the mA/kV was utilized to reduce the radiation dose to as low as reasonably achievable. COMPARISON: None.  HISTORY: ORDERING SYSTEM PROVIDED HISTORY: r/o infarct TECHNOLOGIST PROVIDED HISTORY: FINDINGS: BRAIN/VENTRICLES: There is no acute intracranial hemorrhage, mass HISTORY: r/o PE, PTX Syncope FINDINGS: Pulmonary Arteries: Pulmonary arteries are adequately opacified for evaluation. Possible filling defect segmental branch lingula. Main pulmonary artery is normal in caliber. Mediastinum: No evidence of mediastinal lymphadenopathy. The heart and pericardium demonstrate no acute abnormality. There is no acute abnormality of the thoracic aorta. Severe cardiomegaly. Minimal if any pericardial effusion. Lungs/Pleura: Moderate bilateral pleural effusions. Mild perihilar ground-glass opacities consistent with edema. Upper Abdomen: Possible perinephric emphysema and/or pneumobilia. Soft Tissues/Bones: No acute bone or soft tissue abnormality. Possible pulmonary embolism lingular segment pulmonary artery. Severe cardiomegaly, edema and effusions consistent with probable CHF. Possible perinephric emphysema or pneumobilia. Recommend follow-up CT of the abdomen and pelvis. RECOMMENDATIONS: D/w emergency physician dr Robinson Delatorre at 5:25 p.m. Vl Dup Lower Extremity Venous Bilateral    Result Date: 11/28/2018    [facility]  Vascular Lower Extremities DVT Study Procedure   Patient Name   Jermain Challenger  Date of Study           11/27/2018   Date of Birth  1990  Gender                  Male   Age            33 year(s)  Race                    Black   Room Number    1006        Height:                 66 inch, 167.64 cm   Corporate ID # [id number]  Weight:                 125 pounds, 56.7 kg   Patient Acct # [de-identified]   BSA:        1.64 m^2    BMI:       20.18 kg/m^2   MR #           [id number]     Sonographer             JuliaSandrine   Accession #    [id number]   Interpreting Physician  Isaiah Worley   Referring                  Referring Physician     Paige Gallegos MD  Nurse  Practitioner  Procedure Type of Study:   Veins: Lower Extremities DVT Study, Venous Scan Lower Bilateral.  Indications for Study:Pulmonary Embolism. Patient Status:ER. - Critical Result: To +---------+----------+-----------------------------------------------------+ ! Diagnosis! Date      ! Comments                                             ! +---------+----------+-----------------------------------------------------+ ! CHF      ! !                                                     ! +---------+----------+-----------------------------------------------------+ ! Other    ! ! Atrial thrombus seen on echo 8-18                    ! +---------+----------+-----------------------------------------------------+ ! Other    ! ! Mental retardation                                   ! +---------+----------+-----------------------------------------------------+ ! Other    !11/26/2018! Pulmonary embolism                                   ! +---------+----------+-----------------------------------------------------+ Velocities are measured in cm/s ; Diameters are measured in cm Right Lower Extremities DVT Study Measurements Right 2D Measurements +------------------------------------+----------+---------------+----------+ ! Location                            ! Visualized! Compressibility! Thrombosis! +------------------------------------+----------+---------------+----------+ ! Common Femoral                      !Yes       ! Yes            ! None      ! +------------------------------------+----------+---------------+----------+ ! Prox Femoral                        !Yes       ! Yes            ! None      ! +------------------------------------+----------+---------------+----------+ ! Mid Femoral                         !Yes       ! Yes            ! None      ! +------------------------------------+----------+---------------+----------+ ! Dist Femoral                        !Yes       ! Yes            ! None      ! +------------------------------------+----------+---------------+----------+ ! Popliteal                           !Yes       ! Yes            ! None      !

## 2018-12-08 NOTE — PROGRESS NOTES
of the lingular PE. He is on an ACE inhibitor prior lisinopril 5 mg oral daily and is on bisoprolol 2.5 mg oral daily. When patient came into the hospital he was tachycardic. Note, his initial chest x-ray from 11/26/18 showed cardiomegaly plus/minus pericardial effusion with mild CHF in the left subpulmonic effusion. Chest x-ray from earlier today showed cardiomegaly and marked pulmonary edema. 12/8/18: Hemodynamically low stable, afebrile , no SOB , saturating 98% on room air. Creatinine is fine , sodium is still high, likely from bumex use. Was given  Bumex 2 mg for 1 dose yesterday. UOP couldn't be measured accurately but he had 3 wet briefs. Not being weighed  Hypoglycemia in the morning in 60's , not on TF ncurrently  Still altered, not following command , last night was given sedative for agitation.   Started on Eliquis    Past Medical History:        Diagnosis Date    Acute systolic congestive heart failure ([address] 7/9/2018    Asthma     Development delay     Seizures (HCC)        Past Surgical History:        Procedure Laterality Date    CYST INCISION AND DRAINAGE      HC  PICC [address] DOUBLE  12/5/2018         MS REMV ART CLOT ILIAC-POP,LEG INCIS Right 11/28/2018    RIGHT FEMORAL EXPLORATION WITH RIGHT FEMORAL THROMBECTOMY performed by Tabby Segal MD at [facility]       Current Medications:      glucose (GLUTOSE) 40 % oral gel 15 g PRN   dextrose 50 % solution 12.5 g PRN   glucagon (rDNA) injection 1 mg PRN   dextrose 5 % solution PRN   PN-Adult 2-in-1 Central Line (Standard) Continuous TPN   HYDROmorphone (DILAUDID) tablet 1 mg Q4H PRN   LORazepam (ATIVAN) tablet 1 mg Q4H PRN   promethazine (PHENERGAN) injection 12.5 mg Q4H PRN   sodium chloride flush 0.9 % injection 10 mL 2 times per day   sodium chloride flush 0.9 % injection 10 mL PRN   sodium chloride flush 0.9 % injection 10 mL 2 times per day   sodium chloride flush 0.9 % injection 10 mL PRN   morphine injection 2 mg Q2H found for: TPU     C3: No results found for: C3  C4: No results found for: C4  MPO ANCA:  No results found for: MPO . PR3 ANCA:  No results found for: PR3  Urine Sodium:  No results found for: RUI   Urine Potassium:  No results found for: KUR  Urine Chloride:  No components found for: CLU  Urine Ph:  No components found for: PO4U  Urine Osmolarity:  No results found for: OSMOU  Urine Creatinine:  No results found for: LABCREA  Urine Eosinophils: No components found for: EOSU  Urine Protein:  No results found for: TPU  Urinalysis:  U/A: No results found for: Valdovinos Bang, PHUR, LABCAST, [address], RBCUA, MUCUS, TRICHOMONAS, YEAST, BACTERIA, CLARITYU, SPECGRAV, LEUKOCYTESUR, UROBILINOGEN, BILIRUBINUR, BLOODU, GLUCOSEU, [facility], [address] [place] [address]      Radiology:  Reviewed as available. Assessment:  1. Systolic CHF with low EF 44% based on previous echo and positive for pulmonary edema on chest x-ray  2. Hypernatremia earlier in the admission, improving  3. Hypokalemia earlier in the admission, improvement   4. Pulmonary embolism, on Eliquis       Plan:  Hold diuretics today  Pt unable to tolerate TPN   Daily basic metabolic panel with reflex magnesium  Will follow for further recommendations  Prognosis very poor  Nothing else to add will sign off  Family likely to proceed with comfort care measures    Thank you for the consultation. Please do not hesitate to call with questions. Jacob Campbell MD  PGY-2, Internal Medicine resident  [facility]. 12/8/2018 at 10:21 AM     Attending Physician Statement  I have discussed the care of Brigitte Patten, including pertinent history and exam findings with the resident/fellow. I have reviewed the key elements of all parts of the encounter with the resident/fellow. I have seen and examined the patient with the resident/fellow. I agree with the assessment and plan and status of the problem list as documented.       .  Electronically signed by

## 2018-12-08 NOTE — PROGRESS NOTES
failure ([facility] Asthma; Development delay; and Seizures ([facility] PAST SURGICAL HISTORY:      has a past surgical history that includes cyst incision and drainage; pr remv art clot iliac-pop,leg incis (Right, 11/28/2018); and hc  picc powerpicc double (12/5/2018). SOCIAL HISTORY:      reports that he has never smoked. He has never used smokeless tobacco. He reports that he does not drink alcohol or use drugs. FAMILY HISTORY:     family history includes Diabetes in his father; No Known Problems in his mother. HOME MEDICATIONS:      Prior to Admission medications    Medication Sig Start Date End Date Taking? Authorizing Provider   warfarin (COUMADIN) 2.5 MG tablet take 1/2 to 1 tablet once daily as directed BY CASE MANAGEMENT 10/23/18  Yes Yonatan Jo MD   warfarin (COUMADIN) 1 MG tablet Take 1/2 or 1 whole tablet (or as directed by Medication Management) by mouth once daily.  10/22/18  Yes Yonatan Jo MD   potassium chloride (MICRO-K) 10 MEQ extended release capsule Take 10 mEq by mouth daily   Yes Historical Provider, MD   [facility] 5-100 MG/5ML LIQD take 5 milliliters by mouth every 8 hours if needed for cough 10/4/18  Yes Yonatan Jo MD   bisoprolol (ZEBETA) 5 MG tablet Take 2.5 mg by mouth daily   Yes Historical Provider, MD   furosemide (LASIX) 20 MG tablet Take 20 mg by mouth daily as needed (swelling, shortness of breath)   Yes Historical Provider, MD   Acetaminophen (TYLENOL) 167 MG/5ML LIQD Take 5 mLs by mouth every 6 hours as needed for Pain or Fever Take as 7/9/18  Yes Yonatan Jo MD   Dextromethorphan-guaiFENesin ([address] FAST-MAX DM MAX) 5-100 MG/5ML LIQD Take 5 mLs by mouth every 12 hours as needed (fever or pain) 7/9/18  Yes Yonatan Jo MD   carvedilol (COREG) 3.125 MG tablet Take 3.125 mg by mouth 2 times daily (with meals)   Yes Historical Provider, MD   cetirizine (ZYRTEC) 1 MG/ML SOLN syrup Take 10 mLs by mouth daily 6/11/18  Yes Yonatan Jo MD guaiFENesin (MUCINEX) 600 MG extended release tablet Take 1 tablet by mouth 2 times daily 5/24/18  Yes Ayesha Mora MD   albuterol (PROVENTIL) (2.5 MG/3ML) 0.083% nebulizer solution Take 3 mLs by nebulization every 6 hours as needed for Wheezing 5/24/18  Yes Ayesha Mora MD   levETIRAcetam (KEPPRA) 100 MG/ML solution Take 10 mg/kg by mouth 2 times daily   Yes Historical Provider, MD   Incontinence Supply Disposable ([address]) MISC Use as directed 1/22/18  Yes Ayesha Mora MD   lisinopril (PRINIVIL;ZESTRIL) 5 MG tablet take 1 tablet by mouth once daily 12/7/18   Chanel Cedillo MD   RA FEVER REDUCER/PAIN RELIEVER 160 MG/5ML suspension take 5 milliliters by mouth every 6 hours if needed for pain or fever 11/27/18   Ayesha Mora MD       ALLERGIES:     Seasonal      OBJECTIVE:       Vitals:    12/07/18 0114 12/07/18 0830 12/08/18 0738 12/08/18 0740   BP: (!) 114/90 (!) 152/84  106/86   Pulse: 99 104 98 98   Resp: 20 20     Temp:  97.3 °F (36.3 °C) 97.5 °F (36.4 °C)    TempSrc:  Axillary Axillary    SpO2: 96%      Weight:       Height:             Intake/Output Summary (Last 24 hours) at 12/08/18 0842  Last data filed at 12/07/18 1800   Gross per 24 hour   Intake              770 ml   Output                0 ml   Net              770 ml       PHYSICAL EXAM:  General Appearance  Alert , awake , not in acute distress  HEENT - Head is normocephalic, atraumatic. Lungs - Bilateral equal air entry , no wheezes. Coarse breath sounds but seems improved since yesterday   Cardiovascular - Heart sounds are normal.  Regular rhythm, normal rate without murmur, gallop or rub.   Abdomen - Soft, nontender, nondistended, no masses or organomegaly  Neurologic - There are no new focal motor or sensory deficits  Skin - No bruising or bleeding on exposed skin area  Extremities - No cyanosis, clubbing or edema    DIAGNOSTICS:     Laboratory Testing:    No results found for this or any previous visit (from the FINDINGS: BRAIN/VENTRICLES: There is no acute intracranial hemorrhage, mass effect or midline shift. No abnormal extra-axial fluid collection. The gray-white differentiation is maintained without evidence of an acute infarct. There is no evidence of hydrocephalus. Chronic appearing lacunar infarction within the left cerebellar white matter. ORBITS: The visualized portion of the orbits demonstrate no acute abnormality. SINUSES: The visualized paranasal sinuses and mastoid air cells demonstrate no acute abnormality. SOFT TISSUES/SKULL:  No acute abnormality of the visualized skull or soft tissues. Chronic appearing lacunar infarction within the left cerebellum. No obvious acute intracranial pathology. If there is clinical suspicion for CVA, please consider further evaluation with MRI. Xr Chest Portable    Result Date: 12/7/2018  EXAMINATION: SINGLE XRAY VIEW OF THE CHEST 12/7/2018 8:47 am COMPARISON: November 26, 2018 HISTORY: ORDERING SYSTEM PROVIDED HISTORY: cough r/o PNA TECHNOLOGIST PROVIDED HISTORY: cough r/o PNA FINDINGS: Right upper extremity PICC in good position. Cardiac monitoring leads overlie the chest.  Cardiomegaly with new pulmonary edema. Previous exam showed subpulmonic effusion on the left which appears to have resolved. 1. Marked pulmonary edema. 2. Improved left-sided sub pulmonic effusion. 3. Marked cardiomegaly. Xr Chest Portable    Result Date: 11/26/2018  EXAMINATION: SINGLE XRAY VIEW OF THE CHEST 11/26/2018 2:45 pm COMPARISON: None. HISTORY: ORDERING SYSTEM PROVIDED HISTORY: cp syncope EF 10% TECHNOLOGIST PROVIDED HISTORY: cp syncope EF 10% Ordering Physician Provided Reason for Exam: chest pain, syncope. upr Acuity: Unknown Type of Exam: Unknown FINDINGS: Enlarged of the cardiac silhouette. Central pulmonary vessels are prominent and peripheral pulmonary vessels are indistinct. Left perihilar airspace disease. Subpulmonic left pleural effusion.      Cardiomegaly and/or pericardial effusion with mild CHF and left pleural effusion which is predominantly subpulmonic     Ct Chest Pulmonary Embolism W Contrast    Result Date: 11/26/2018  EXAMINATION: CTA OF THE CHEST 11/26/2018 5:01 pm TECHNIQUE: CTA of the chest was performed after the administration of intravenous contrast.  Multiplanar reformatted images are provided for review. MIP images are provided for review. Dose modulation, iterative reconstruction, and/or weight based adjustment of the mA/kV was utilized to reduce the radiation dose to as low as reasonably achievable. COMPARISON: Chest x-ray from today HISTORY: ORDERING SYSTEM PROVIDED HISTORY: r/o PE, PTX Syncope FINDINGS: Pulmonary Arteries: Pulmonary arteries are adequately opacified for evaluation. Possible filling defect segmental branch lingula. Main pulmonary artery is normal in caliber. Mediastinum: No evidence of mediastinal lymphadenopathy. The heart and pericardium demonstrate no acute abnormality. There is no acute abnormality of the thoracic aorta. Severe cardiomegaly. Minimal if any pericardial effusion. Lungs/Pleura: Moderate bilateral pleural effusions. Mild perihilar ground-glass opacities consistent with edema. Upper Abdomen: Possible perinephric emphysema and/or pneumobilia. Soft Tissues/Bones: No acute bone or soft tissue abnormality. Possible pulmonary embolism lingular segment pulmonary artery. Severe cardiomegaly, edema and effusions consistent with probable CHF. Possible perinephric emphysema or pneumobilia. Recommend follow-up CT of the abdomen and pelvis. RECOMMENDATIONS: D/w emergency physician dr Oneida Sanches at 5:25 p.m.      Vl Dup Lower Extremity Venous Bilateral    Result Date: 11/28/2018    [facility]  Vascular Lower Extremities DVT Study Procedure   Patient Name   Eliana Hall  Date of Study           11/27/2018   Date of Birth  1990  Gender                  Male   Age            33 year(s)  Race +------------------------------------+----------+---------------+----------+ ! Prox Femoral                        !Yes       ! Yes            ! None      ! +------------------------------------+----------+---------------+----------+ ! Mid Femoral                         !Yes       ! Yes            ! None      ! +------------------------------------+----------+---------------+----------+ ! Dist Femoral                        !Yes       ! Yes            ! None      ! +------------------------------------+----------+---------------+----------+ ! Popliteal                           !Yes       ! Yes            ! None      ! +------------------------------------+----------+---------------+----------+ ! Sapheno Femoral Junction            ! Yes       ! Yes            ! None      ! +------------------------------------+----------+---------------+----------+ ! PTV                                 ! Yes       ! Yes            ! None      ! +------------------------------------+----------+---------------+----------+ ! Peroneal                            !Yes       ! Yes            ! None      ! +------------------------------------+----------+---------------+----------+ ! Gastroc                             ! Yes       ! Yes            ! None      ! +------------------------------------+----------+---------------+----------+ ! GSV Thigh                           ! Yes       ! Yes            ! None      ! +------------------------------------+----------+---------------+----------+ ! GSV Knee                            ! Yes       ! Yes            ! None      ! +------------------------------------+----------+---------------+----------+ ! GSV Ankle                           ! Yes       ! Yes            ! None      ! +------------------------------------+----------+---------------+----------+ ! SSV                                 ! Yes       ! Yes            ! None      ! +------------------------------------+----------+---------------+----------+ Right Doppler Measurements +---------------------------+------+------+--------------------------------+ ! Location                   ! Signal!Reflux! Reflux (msec)                   ! +---------------------------+------+------+--------------------------------+ ! Common Femoral             !Phasic!      !                                ! +---------------------------+------+------+--------------------------------+ ! Prox Femoral               !Phasic!      !                                ! +---------------------------+------+------+--------------------------------+ ! Popliteal                  !Phasic!      !                                ! +---------------------------+------+------+--------------------------------+ Left Lower Extremities DVT Study Measurements Left 2D Measurements +------------------------------------+----------+---------------+----------+ ! Location                            ! Visualized! Compressibility! Thrombosis! +------------------------------------+----------+---------------+----------+ ! Common Femoral                      !Yes       ! Yes            ! None      ! +------------------------------------+----------+---------------+----------+ ! Prox Femoral                        !Yes       ! Yes            ! None      ! +------------------------------------+----------+---------------+----------+ ! Mid Femoral                         !Yes       ! Yes            ! None      ! +------------------------------------+----------+---------------+----------+ ! Dist Femoral                        !Yes       ! Yes            ! None      ! +------------------------------------+----------+---------------+----------+ ! Popliteal                           !Yes       ! Yes            ! None      ! +------------------------------------+----------+---------------+----------+ ! Sapheno Femoral Junction            ! Yes       ! Yes            ! None      ! +------------------------------------+----------+---------------+----------+ ! PTV fluoroscopic guidance, through the right nostril, a 8 [place] feeding tube was negotiated into the stomach. The tip of the catheter was manipulated into the distal stomach. Air instillation confirmed satisfactory tip position. The catheter was secured appropriately. The patient tolerated the procedure well. FINDINGS: Enteric tip terminating in the distal stomach     Fluoroscopic guided placement of an 8 [place] feeding tube terminating in the distal stomach. Ready for use.        Current Facility-Administered Medications   Medication Dose Route Frequency Provider Last Rate Last Dose    glucose (GLUTOSE) 40 % oral gel 15 g  15 g Oral PRN Darian Le MD        dextrose 50 % solution 12.5 g  12.5 g Intravenous PRN Darian Le MD   12.5 g at 12/08/18 0833    glucagon (rDNA) injection 1 mg  1 mg Intramuscular PRN Darian Le MD        dextrose 5 % solution  100 mL/hr Intravenous PRN Darian Le MD        PN-Adult 2-in-1 Central Line (Standard)   Intravenous Continuous TPN Mekhi Marte MD   Stopped at 12/07/18 1750    HYDROmorphone (DILAUDID) tablet 1 mg  1 mg Oral Q4H PRN Mary Mac MD   1 mg at 12/05/18 0416    LORazepam (ATIVAN) tablet 1 mg  1 mg Oral Q4H PRN Mary Mac MD   1 mg at 12/05/18 1453    promethazine (PHENERGAN) injection 12.5 mg  12.5 mg Intramuscular Q4H PRN Charles Mg MD   12.5 mg at 12/05/18 1541    sodium chloride flush 0.9 % injection 10 mL  10 mL Intravenous 2 times per day Darian Le MD   10 mL at 12/06/18 2040    sodium chloride flush 0.9 % injection 10 mL  10 mL Intravenous PRN Darian Le MD        sodium chloride flush 0.9 % injection 10 mL  10 mL Intravenous 2 times per day Darian Le MD   10 mL at 12/08/18 0054    sodium chloride flush 0.9 % injection 10 mL  10 mL Intravenous PRN Darian Le MD        morphine injection 2 mg  2 mg Intravenous Q2H PRN Lesvia Queen MD   2 mg at 12/07/18 2206    Or    morphine (PF) injection 4 mg  4 Intravenous PRN Darren Francisco MD        acetaminophen (TYLENOL) 160 MG/5ML solution 650 mg  650 mg Oral Q4H PRN Darren Francisco MD   650 mg at 12/01/18 1821       ASSESSMENT:     Principal Problem:    Pulmonary embolus ([address]  Active Problems:    Seizure disorder ([address]    Mental retardation    Atrial thrombus without antecedent myocardial infarction    Chronic systolic congestive heart failure ([address]    Protein-calorie malnutrition ([address]    Congestive heart failure ([address]  Resolved Problems:    * No resolved hospital problems. *      PLAN:     Acute on chronic HFrEF  - Diuresis per cardiology and nephrology   - appreciate recs   - Will likely need to switch his Bioprolol to IV. Will defer to cardiology   - Not candidate for AICD placement at this time due to severe malnutrition. 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 Will likely switch to lovenox SubQ due to poor swallowing. Discussed with pharmacy and will discuss with dad and palliative care     Seizure disorder   - Keppra 567 mg BID          Discussed care plan with nurse after getting her input.        DVT prophylaxis: already anticoagulated on eliquis   GI prophylaxis: no indication at this time. Plan will be discussed with the attending, Dr Sara Dietrich MD  Family Medicine Resident  12/8/2018 8:42 AM            Please note that this chart was generated using voice recognition Dragon dictation software. Although every effort was made to ensure the accuracy of this automated transcription, some errors in transcription may have occurred    Attending Physician Statement  I have discussed the care of Rukhsana Avila, including pertinent history and exam findings,  with the resident.  I have reviewed the key elements of all parts of the

## 2018-12-09 NOTE — PROGRESS NOTES
Update note    Met with [facility] hospice admission nurse. Plan for home with home hospice tomorrow at 1 PM.    Need [facility] paperwork that father agreed to per hospice nurse, but he was not present when I came to visit. If unable to obtain today, we can try again tomorrow. Patient on scheduled morphine now with breakthrough. Concern for impending death while inpatient. Will differ to primary team for comfort meds in that case.     No CPR or Tj Art MD   Hospice and Palliative Care Fellow, PGY-4  12/9/2018 2:10 PM

## 2018-12-09 NOTE — CODE DOCUMENTATION
Code status documentation:    Palliative care and primary team had a long discussion with the patient's family members, in presence of the RN(s) and  taking care of the patient. Patient's current condition, laboratory and radiographic findings as well as recommendations of physicians consulted on the case were discussed with the patient's family in detail in simple [place]. All questions and concerns of the family were addressed, and appropriate emotional support was provided. After understanding the patient's current medical condition, family decided that they wanted the patient's code status be changed to [facility] and writer signed [facility] order form which was witnessed by the RN(s). I honored the family's wishes, and changed the patient's code status to [facility]  Emotional support given.     Guicho Blanco MD  PGY-3 FM Resident

## 2018-12-09 NOTE — PROGRESS NOTES
GI/Bowel: The stomache,small bowel, and colon appear normal. Oral contrast is noted in the stomach in small bowel. There is a loop of colon in Morison's pouch but no evidence of. Nephric emphysema or pneumobilia. Pelvis: Normal Peritoneum/Retroperitoneum: The abdominal aorta and iliac arteries are normal in caliber. There is no pathologic adenopathy. Bones/Soft Tissues: Normal     Moderate cardiomegaly and effusions. No acute disease such is perinephric gas or pneumobilia. Ct Head Wo Contrast    Result Date: 11/27/2018  EXAMINATION: CT OF THE HEAD WITHOUT CONTRAST  11/27/2018 3:42 pm TECHNIQUE: CT of the head was performed without the administration of intravenous contrast. Dose modulation, iterative reconstruction, and/or weight based adjustment of the mA/kV was utilized to reduce the radiation dose to as low as reasonably achievable. COMPARISON: None. HISTORY: ORDERING SYSTEM PROVIDED HISTORY: r/o infarct TECHNOLOGIST PROVIDED HISTORY: FINDINGS: BRAIN/VENTRICLES: There is no acute intracranial hemorrhage, mass effect or midline shift. No abnormal extra-axial fluid collection. The gray-white differentiation is maintained without evidence of an acute infarct. There is no evidence of hydrocephalus. Chronic appearing lacunar infarction within the left cerebellar white matter. ORBITS: The visualized portion of the orbits demonstrate no acute abnormality. SINUSES: The visualized paranasal sinuses and mastoid air cells demonstrate no acute abnormality. SOFT TISSUES/SKULL:  No acute abnormality of the visualized skull or soft tissues. Chronic appearing lacunar infarction within the left cerebellum. No obvious acute intracranial pathology. If there is clinical suspicion for CVA, please consider further evaluation with MRI.      Xr Chest Portable    Result Date: 12/7/2018  EXAMINATION: SINGLE XRAY VIEW OF THE CHEST 12/7/2018 8:47 am COMPARISON: November 26, 2018 HISTORY: ORDERING SYSTEM PROVIDED HISTORY: acute abnormality of the thoracic aorta. Severe cardiomegaly. Minimal if any pericardial effusion. Lungs/Pleura: Moderate bilateral pleural effusions. Mild perihilar ground-glass opacities consistent with edema. Upper Abdomen: Possible perinephric emphysema and/or pneumobilia. Soft Tissues/Bones: No acute bone or soft tissue abnormality. Possible pulmonary embolism lingular segment pulmonary artery. Severe cardiomegaly, edema and effusions consistent with probable CHF. Possible perinephric emphysema or pneumobilia. Recommend follow-up CT of the abdomen and pelvis. RECOMMENDATIONS: D/w emergency physician dr Ashley Glover at 5:25 p.m. Vl Dup Lower Extremity Venous Bilateral    Result Date: 11/28/2018    [facility]  Vascular Lower Extremities DVT Study Procedure   Patient Name   Lambert Valencia  Date of Study           11/27/2018   Date of Birth  1990  Gender                  Male   Age            33 year(s)  Race                    Black   Room Number    1006        Height:                 66 inch, 167.64 cm   Corporate ID # [id number]  Weight:                 125 pounds, 56.7 kg   Patient Acct # [de-identified]   BSA:        1.64 m^2    BMI:       20.18 kg/m^2   MR #           [id number]     Sonographer             Sandrine Dumont   Accession #    [id number]   Interpreting Physician  [address] [place] [address]   Referring                  Referring Physician     Emily Ramos MD  Nurse  Practitioner  Procedure Type of Study:   Veins: Lower Extremities DVT Study, Venous Scan Lower Bilateral.  Indications for Study:Pulmonary Embolism. Patient Status:ER. - Critical Result: To Dr Felicitas Metz in the ED at 8:52 am.  Conclusions   Summary   Simultaneous real time imaging utilizing B-Mode, color doppler and  spectral waveform analysis was performed on the bilateral lower  extremities for venous examination of the deep and superficial systems. Findings are:   Right:  No evidence of deep or superficial venous thrombosis. Doppler Measurements +------------------------+-------------------------+-----------------------+ ! Location                ! Signal                   !Reflux                 ! +------------------------+-------------------------+-----------------------+ ! SCV                     ! Pulsatile                !                       ! +------------------------+-------------------------+-----------------------+    Ir Place Ng Tube By Dr Tasha Limon    Result Date: 12/4/2018  PROCEDURE: XR PLACE NASOGASTRIC TUBE PHYS 12/3/2018 HISTORY: ORDERING SYSTEM PROVIDED HISTORY: Poor PO intake, patient MRDD/autism not cooperating with bedside placement TECHNOLOGIST PROVIDED HISTORY: Flexiflo Discussed with Dr. Kendra Moreno, IR will perform procedure possibly today otherwise tomorrow a.m. Reason for exam:->Poor PO intake, patient MRDD/autism not cooperating with bedside placement TECHNIQUE: Fluoroscopy was utilized CONTRAST: None SEDATION: None FLUOROSCOPY DOSE AND TYPE OR TIME AND EXPOSURES: 0.8 minute; 219 cGy cm squared DESCRIPTION OF PROCEDURE: Universal protocol was observed. Under fluoroscopic guidance, through the right nostril, a 8 Western Josey feeding tube was negotiated into the stomach. The tip of the catheter was manipulated into the distal stomach. Air instillation confirmed satisfactory tip position. The catheter was secured appropriately. The patient tolerated the procedure well. FINDINGS: Enteric tip terminating in the distal stomach     Fluoroscopic guided placement of an 8 Albanian feeding tube terminating in the distal stomach. Ready for use.        Current Facility-Administered Medications   Medication Dose Route Frequency Provider Last Rate Last Dose    glucose (GLUTOSE) 40 % oral gel 15 g  15 g Oral PRN Darian Le MD        dextrose 50 % solution 12.5 g  12.5 g Intravenous PRN Darian Le MD   12.5 g at 12/08/18 0833    glucagon (rDNA) injection 1 mg  1 mg Intramuscular PRN Darian Le MD        dextrose 5 % solution Lissy Metz MD        albuterol (PROVENTIL) nebulizer solution 2.5 mg  2.5 mg Nebulization Q6H PRN Anjelica Cavazos MD        lisinopril (PRINIVIL;ZESTRIL) tablet 5 mg  5 mg Oral Daily Anjelica Cavazos MD   Stopped at 12/07/18 1105    sodium chloride flush 0.9 % injection 10 mL  10 mL Intravenous 2 times per day Anjelica Cavazos MD   10 mL at 12/07/18 1252    sodium chloride flush 0.9 % injection 10 mL  10 mL Intravenous PRN Anjelica Cavazos MD        magnesium hydroxide (MILK OF MAGNESIA) 400 MG/5ML suspension 30 mL  30 mL Oral Daily PRN Anjelica Cavazos MD        ondansetron (ZOFRAN) injection 4 mg  4 mg Intravenous Q6H PRN Anjelica Cavazos MD   4 mg at 11/29/18 0128    potassium chloride (KLOR-CON M) extended release tablet 40 mEq  40 mEq Oral PRN Anjelica Cavazos MD        Or    potassium chloride 20 MEQ/15ML (10%) oral solution 40 mEq  40 mEq Oral PRN Anjelica Cavazos MD        Or    potassium chloride 10 mEq/100 mL IVPB (Peripheral Line)  10 mEq Intravenous PRN Anjelica Cavazos MD        acetaminophen (TYLENOL) 160 MG/5ML solution 650 mg  650 mg Oral Q4H PRN Anjelica Cavazos MD   650 mg at 12/01/18 1823       ASSESSMENT:     Principal Problem:    Pulmonary embolus ([address]  Active Problems:    Seizure disorder ([address]    Mental retardation    Atrial thrombus without antecedent myocardial infarction    Chronic systolic congestive heart failure ([address]    Protein-calorie malnutrition ([address]    Congestive heart failure ([address]  Resolved Problems:    * No resolved hospital problems. *      PLAN:     Acute on chronic HFrEF  - Diuresis per cardiology and nephrology   - appreciate recs   - Will likely need to switch his Bioprolol to IV. Will defer to cardiology   - Not candidate for AICD placement at this time due to severe malnutrition.   Patient to f/u with EP in outpatient setting regarding AICD placement      Severe protein calorie malnutrition  - TPN on hold due to CHF  - Palliative following - appreciate

## 2018-12-09 NOTE — PROGRESS NOTES
Nutrition Assessment    Type and Reason for Visit: Reassess    Nutrition Recommendations: General diet with supplements as tolerated    Nutrition Assessment: Minimal po. PN continues on hold. Family is discussing hospice care. Malnutrition Assessment:  · Malnutrition Status: Meets the criteria for severe malnutrition  · Context: Chronic illness  · Findings of the 6 clinical characteristics of malnutrition (Minimum of 2 out of 6 clinical characteristics is required to make the diagnosis of moderate or severe Protein Calorie Malnutrition based on AND/ASPEN Guidelines):  1. Energy Intake-Less than 75% of estimated energy requirement for greater than 7 days,      2. Weight Loss-5% loss or greater, in 3 months  3. Fat Loss-Severe subcutaneous fat loss, Triceps, Fat overlying the ribs, Orbital  4. Muscle Loss-Severe muscle mass loss, Temples (temporalis muscle), Clavicles (pectoralis and deltoids), Calf (gastrocnemius), Interosseous  5. Fluid Accumulation-No significant fluid accumulation, Extremities  6.   Strength-Not measured    Nutrition Risk Level: High    Nutrient Needs:  · Estimated Daily Total Kcal: 9573-7357 kcals/day (28-30 kcal/kg)  · Estimated Daily Protein (g): 65-70 g/day (1.2-1.3 g/kg)    Nutrition Diagnosis:   · Problem: Severe malnutrition  · Etiology: related to Insufficient energy/nutrient consumption     Signs and symptoms:  as evidenced by Nutrition support - PN, Severe muscle loss, Severe loss of subcutaneous fat, Intake 0-25%, Weight loss    Objective Information:  · Nutrition-Focused Physical Findings: Active BS  · Wound Type: Surgical Wound  · Current Nutrition Therapies:  · Oral Diet Orders: General   · Oral Diet intake: 1-25%  · Oral Nutrition Supplement (ONS) Orders: Clear Liquid Oral Supplement  · ONS intake: 1-25%  · Anthropometric Measures:  · Ht: 5' 6\" (167.6 cm)   · Current Body Wt: 119 lb (54 kg)  · % Weight Change:  ,  7.3% loss x 3 months per EHR  · Ideal Body Wt: 142 lb

## 2018-12-10 NOTE — PROGRESS NOTES
[facility]  Progress Note    Date:   12/10/2018  Patient name:  Adriana Buenrostro  Date of admission:  11/26/2018  2:13 PM  MRN:   [id number]  YOB: 1990    SUBJECTIVE/Last 24 hours update:     Patient seen and examined at the bed side , no new acute events overnight. Morphine given this AM.  Patient resting comfortably. Code status changed to [facility] yesterday. No family at bedside. Father at home completing preparations to take patient home with hospice. Notes from nursing staff and Consults had been reviewed, and the overnight progress had been checked with the nursing staff as well. HPI:   Per H&P:  The patient is a 33 y.o.  male with history of Systolic HF (EF 25%), Developmental delay and Blood clots (atreal thrombus contolled with coumadin) who presented with pre syncope. History provided by father who is his primary care giver - Pt does not have ability to speak (MR). States he has had loss of appetite for past few weeks, refusing nutritional shakes, not improved with supplements. The father states they had appt for echo today, upon arrival at the hospital, pt became very weak and his father had to have him sit on the ground, did not lose consciousness or have seizure like activity. The father brought the pt to the ED.  In June, pt diagnosed with CHF (EF 10%), following with [facility] cardiology - being evaluated for AICD implantation.  Pt has been following up with Coumadin clinic.      At ER, VSS except tachycardia, physical exam revealed muffled heart sounds, bilateral pedal edema up to the knees, palpable pedal pulses. CBC and trops were negative. INR and Keppra levels were therapeutic. EKG normal.  CTPE: blood clot in the lingual artery. Started on heparin drip. Pulm consulted, dc heparin, started lovenox.     REVIEW OF SYSTEMS:      Unable to obtain: Patient is nonverbal.  PAST MEDICAL HISTORY:      has a past medical Lower Extremities DVT Study Measurements Right 2D Measurements +------------------------------------+----------+---------------+----------+ ! Location                            ! Visualized! Compressibility! Thrombosis! +------------------------------------+----------+---------------+----------+ ! Common Femoral                      !Yes       ! Yes            ! None      ! +------------------------------------+----------+---------------+----------+ ! Prox Femoral                        !Yes       ! Yes            ! None      ! +------------------------------------+----------+---------------+----------+ ! Mid Femoral                         !Yes       ! Yes            ! None      ! +------------------------------------+----------+---------------+----------+ ! Dist Femoral                        !Yes       ! Yes            ! None      ! +------------------------------------+----------+---------------+----------+ ! Popliteal                           !Yes       ! Yes            ! None      ! +------------------------------------+----------+---------------+----------+ ! Sapheno Femoral Junction            ! Yes       ! Yes            ! None      ! +------------------------------------+----------+---------------+----------+ ! PTV                                 ! Yes       ! Yes            ! None      ! +------------------------------------+----------+---------------+----------+ ! Peroneal                            !Yes       ! Yes            ! None      ! +------------------------------------+----------+---------------+----------+ ! Gastroc                             ! Yes       ! Yes            ! None      ! +------------------------------------+----------+---------------+----------+ ! GSV Thigh                           ! Yes       ! Yes            ! None      ! +------------------------------------+----------+---------------+----------+ ! GSV Knee                            ! Yes       ! Yes            ! None      ! +------------------------------------+----------+---------------+----------+ ! GSV Ankle                           ! Yes       ! Yes            ! None      ! +------------------------------------+----------+---------------+----------+ ! SSV                                 ! Yes       ! Yes            ! None      ! +------------------------------------+----------+---------------+----------+ Right Doppler Measurements +---------------------------+------+------+--------------------------------+ ! Location                   ! Signal!Reflux! Reflux (msec)                   ! +---------------------------+------+------+--------------------------------+ ! Common Femoral             !Phasic!      !                                ! +---------------------------+------+------+--------------------------------+ ! Prox Femoral               !Phasic!      !                                ! +---------------------------+------+------+--------------------------------+ ! Popliteal                  !Phasic!      !                                ! +---------------------------+------+------+--------------------------------+ Left Lower Extremities DVT Study Measurements Left 2D Measurements +------------------------------------+----------+---------------+----------+ ! Location                            ! Visualized! Compressibility! Thrombosis! +------------------------------------+----------+---------------+----------+ ! Common Femoral                      !Yes       ! Yes            ! None      ! +------------------------------------+----------+---------------+----------+ ! Prox Femoral                        !Yes       ! Yes            ! None      ! +------------------------------------+----------+---------------+----------+ ! Mid Femoral                         !Yes       ! Yes            ! None      ! +------------------------------------+----------+---------------+----------+ ! Dist Femoral                        !Yes       ! Yes            ! None      ! +------------------------------------+----------+---------------+----------+ ! Dist SCV                            ! Yes       ! Yes            ! None      ! +------------------------------------+----------+---------------+----------+ ! Innominate                          ! Yes       ! Yes            ! None      ! +------------------------------------+----------+---------------+----------+ ! Prox Axillary                       ! Yes       ! Yes            ! None      ! +------------------------------------+----------+---------------+----------+ ! Dist Axillary                       ! Yes       ! Yes            ! None      ! +------------------------------------+----------+---------------+----------+ ! Prox Brachial                       !Partial   !Yes            ! None      ! +------------------------------------+----------+---------------+----------+ ! Dist Brachial                       !Yes       ! Yes            ! None      ! +------------------------------------+----------+---------------+----------+ ! Prox Radial                         !Yes       ! Yes            ! None      ! +------------------------------------+----------+---------------+----------+ ! Dist Radial                         !Yes       ! Yes            ! None      ! +------------------------------------+----------+---------------+----------+ ! Prox Ulnar                          ! Yes       ! Yes            ! None      ! +------------------------------------+----------+---------------+----------+ ! Dist Ulnar                          ! Yes       ! Yes            ! None      ! +------------------------------------+----------+---------------+----------+ ! Basilic at UA                       ! Partial   !No             !Acute     ! +------------------------------------+----------+---------------+----------+ ! Basilic at AF                       ! Yes       ! No             !Acute     ! +------------------------------------+----------+---------------+----------+ ! Nathaniel at [address] !Yes       !Yes            ! None      ! +------------------------------------+----------+---------------+----------+ ! Cephalic at UA                      ! Yes       ! Yes            ! None      ! +------------------------------------+----------+---------------+----------+ ! Cephalic at AF                      ! No        !               !          ! +------------------------------------+----------+---------------+----------+ ! Cephalic at [address]                      ! No        !               !          ! +------------------------------------+----------+---------------+----------+ Doppler Measurements +------------------------+-------------------------+-----------------------+ ! Location                ! Signal                   !Reflux                 ! +------------------------+-------------------------+-----------------------+ ! IJV                     ! Pulsatile                !                       ! +------------------------+-------------------------+-----------------------+ ! SCV                     ! Pulsatile                !                       ! +------------------------+-------------------------+-----------------------+ ! Axillary                ! Pulsatile                !                       ! +------------------------+-------------------------+-----------------------+ Left UE Vein Measurements Doppler Measurements +------------------------+-------------------------+-----------------------+ ! Location                ! Signal                   !Reflux                 ! +------------------------+-------------------------+-----------------------+ ! SCV                     ! Pulsatile                !                       ! +------------------------+-------------------------+-----------------------+    Ir Place Ng Tube By Dr Sanket Moulton    Result Date: 12/4/2018  PROCEDURE: XR PLACE NASOGASTRIC TUBE PHYS 12/3/2018 HISTORY: ORDERING SYSTEM PROVIDED HISTORY: Poor PO intake, patient MRDD/autism not cooperating with bedside placement TECHNOLOGIST PROVIDED HISTORY: Flexiflo Discussed with Dr. Liza Carvalho, IR will perform procedure possibly today otherwise tomorrow a.m. Reason for exam:->Poor PO intake, patient MRDD/autism not cooperating with bedside placement TECHNIQUE: Fluoroscopy was utilized CONTRAST: None SEDATION: None FLUOROSCOPY DOSE AND TYPE OR TIME AND EXPOSURES: 0.8 minute; 219 cGy cm squared DESCRIPTION OF PROCEDURE: Universal protocol was observed. Under fluoroscopic guidance, through the right nostril, a 8 Western Josey feeding tube was negotiated into the stomach. The tip of the catheter was manipulated into the distal stomach. Air instillation confirmed satisfactory tip position. The catheter was secured appropriately. The patient tolerated the procedure well. FINDINGS: Enteric tip terminating in the distal stomach     Fluoroscopic guided placement of an 8 Ukrainian feeding tube terminating in the distal stomach. Ready for use.        Current Facility-Administered Medications   Medication Dose Route Frequency Provider Last Rate Last Dose    morphine injection 2 mg  2 mg Intravenous Q6H Laura Gorman MD   2 mg at 12/10/18 0737    morphine injection 1 mg  1 mg Intravenous Q2H PRN Laura Gorman MD   1 mg at 12/10/18 0322    glucose (GLUTOSE) 40 % oral gel 15 g  15 g Oral PRN Mendez Crooks MD        dextrose 50 % solution 12.5 g  12.5 g Intravenous PRN Mendez Crooks MD   12.5 g at 12/08/18 0833    glucagon (rDNA) injection 1 mg  1 mg Intramuscular PRN Mendez Crooks MD        dextrose 5 % solution  100 mL/hr Intravenous PRN Mendez Crooks MD        HYDROmorphone (DILAUDID) tablet 1 mg  1 mg Oral Q4H PRN Mertie Lesch, MD   1 mg at 12/05/18 0416    LORazepam (ATIVAN) tablet 1 mg  1 mg Oral Q4H PRN Mertie Lesch, MD   1 mg at 12/05/18 1453    promethazine (PHENERGAN) injection 12.5 mg  12.5 mg Intramuscular Q4H PRN Charles Mg MD   12.5 mg at 12/05/18 1541    sodium chloride flush 0.9 % injection 10 mL Anjelica Cavazos MD   4 mg at 11/29/18 0128    potassium chloride (KLOR-CON M) extended release tablet 40 mEq  40 mEq Oral PRN Anjelica Cavazos MD        Or    potassium chloride 20 MEQ/15ML (10%) oral solution 40 mEq  40 mEq Oral PRN Anjelica Cavazos MD        Or    potassium chloride 10 mEq/100 mL IVPB (Peripheral Line)  10 mEq Intravenous PRN Anjelica Cavazos MD        acetaminophen (TYLENOL) 160 MG/5ML solution 650 mg  650 mg Oral Q4H PRN Anjelica Cavazos MD   650 mg at 12/01/18 1823       ASSESSMENT:     Principal Problem:    Pulmonary embolus ([address]  Active Problems:    Seizure disorder ([address]    Mental retardation    Atrial thrombus without antecedent myocardial infarction    Chronic systolic congestive heart failure ([address]    Protein-calorie malnutrition ([address]    Congestive heart failure ([address]  Resolved Problems:    * No resolved hospital problems. *      PLAN:     Acute on chronic HFrEF  - Diuresis per cardiology and nephrology   - appreciate recs   - Will likely need to switch his Bioprolol to IV. 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 Will likely switch to lovenox SubQ due to poor swallowing. Discussed with pharmacy and will discuss with dad and palliative care     Seizure disorder   - Keppra 567 mg BID    Dispo  - [facility] with hospice today  - Morphine for pain control      Discussed care plan with nurse after getting her input. Above plan discussed with the patient and family in room, who agreed to the above plan     DVT prophylaxis: none. GI prophylaxis: Famotidine 20 mg BID. Plan will be discussed with the attending, Dr. Cely Sykes. Lupillo Mccullough MD  Transitional Year Resident  12/10/2018 8:40 AM            Please note that this chart was generated using voice recognition Dragon dictation software.   Although

## (undated) DEVICE — STRIP,CLOSURE,WOUND,MEDI-STRIP,1/2X4: Brand: MEDLINE

## (undated) DEVICE — SVMMC VASC MAJ PK

## (undated) DEVICE — CONTAINER,SPECIMEN,4OZ,OR STRL: Brand: MEDLINE

## (undated) DEVICE — SUTURE MCRYL + SZ 4 0 L18IN ABSRB UD PC 3 L16MM 3 8 CIR PRIM MCP845G

## (undated) DEVICE — SUTURE PERMAHAND SZ 4-0 L18IN NONABSORBABLE BLK SILK BRAID A183H

## (undated) DEVICE — E-Z CLEAN, NON-STICK, PTFE COATED, ELECTROSURGICAL BLADE ELECTRODE, MODIFIED EXTENDED INSULATION, 2.5 INCH (6.35 CM): Brand: MEGADYNE

## (undated) DEVICE — COVER,LIGHT HANDLE,FLX,2/PK: Brand: MEDLINE INDUSTRIES, INC.

## (undated) DEVICE — LOOP VES W13MM THK09MM MINI RED SIL FLD REPELLENT

## (undated) DEVICE — SUTURE ETHLN SZ 3-0 L18IN NONABSORBABLE BLK L24MM FS-1 3/8 663G

## (undated) DEVICE — SKIN AFFIX SURG ADHESIVE 72/CS 0.55ML: Brand: MEDLINE

## (undated) DEVICE — GLOVE SURG SZ 8 CRM LTX FREE POLYISOPRENE POLYMER BEAD ANTI

## (undated) DEVICE — SUTURE VCRL + SZ 3-0 L27IN ABSRB UD L26MM SH 1/2 CIR VCP416H

## (undated) DEVICE — SUTURE NONABSORBABLE MONOFILAMENT 7-0 BV-1 1X24 IN PROLENE 8702H

## (undated) DEVICE — CATHETER ETER IV 18GA L125IN POLYUR STR RADPQ INTROCAN SFTY

## (undated) DEVICE — SUTURE PROL SZ 6-0 L24IN NONABSORBABLE BLU L9.3MM BV-1 3/8 8805H

## (undated) DEVICE — 3F 40 CM LEMAITRE EMBOLECTOMY CATHETER: Brand: LEMAITRE EMBOLECTOMY CATHETER

## (undated) DEVICE — GLOVE SURG SZ 75 L12IN FNGR THK87MIL WHT LTX FREE

## (undated) DEVICE — 3F 80 CM LEMAITRE EMBOLECTOMY CATHETER: Brand: LEMAITRE EMBOLECTOMY CATHETER

## (undated) DEVICE — CONTAINER SPEC 33OZ URIN COLL BIODEGRADABLE PAPER DISP

## (undated) DEVICE — GEL US 20GM NONIRRITATING OVERWRAPPED FILE PCH TRNSMIT

## (undated) DEVICE — GLOVE SURG SZ 7 L12IN FNGR THK87MIL WHT LTX FREE

## (undated) DEVICE — 3M™ STERI-STRIP™ COMPOUND BENZOIN TINCTURE 40 BAGS/CARTON 4 CARTONS/CASE C1544: Brand: 3M™ STERI-STRIP™